# Patient Record
Sex: FEMALE | Race: WHITE | NOT HISPANIC OR LATINO | Employment: UNEMPLOYED | URBAN - METROPOLITAN AREA
[De-identification: names, ages, dates, MRNs, and addresses within clinical notes are randomized per-mention and may not be internally consistent; named-entity substitution may affect disease eponyms.]

---

## 2023-05-18 DIAGNOSIS — R06.02 SHORTNESS OF BREATH: ICD-10-CM

## 2023-05-18 RX ORDER — ALBUTEROL SULFATE 90 UG/1
AEROSOL, METERED RESPIRATORY (INHALATION)
COMMUNITY
Start: 2021-10-21 | End: 2023-05-18 | Stop reason: SDUPTHER

## 2023-05-19 RX ORDER — ALBUTEROL SULFATE 90 UG/1
AEROSOL, METERED RESPIRATORY (INHALATION)
Qty: 18 G | Refills: 3 | Status: SHIPPED | OUTPATIENT
Start: 2023-05-19

## 2023-06-02 PROBLEM — M25.9 DISORDER OF JOINT OF PELVIC REGION AND THIGH: Status: ACTIVE | Noted: 2023-06-02

## 2023-06-02 PROBLEM — F17.200 NICOTINE DEPENDENCE: Status: ACTIVE | Noted: 2023-06-02

## 2023-06-02 PROBLEM — R06.02 SHORTNESS OF BREATH: Status: ACTIVE | Noted: 2023-06-02

## 2023-06-02 PROBLEM — M25.561 RIGHT KNEE PAIN: Status: RESOLVED | Noted: 2023-06-02 | Resolved: 2023-06-02

## 2023-06-02 PROBLEM — M54.2 CHRONIC NECK PAIN: Status: ACTIVE | Noted: 2023-06-02

## 2023-06-02 PROBLEM — E78.5 HYPERLIPEMIA: Status: ACTIVE | Noted: 2023-06-02

## 2023-06-02 PROBLEM — N95.2 VAGINAL ATROPHY: Status: ACTIVE | Noted: 2023-06-02

## 2023-06-02 PROBLEM — R51.9 HEADACHE: Status: ACTIVE | Noted: 2023-06-02

## 2023-06-02 PROBLEM — M47.812 SPONDYLOSIS OF CERVICAL REGION WITHOUT MYELOPATHY OR RADICULOPATHY: Status: ACTIVE | Noted: 2023-06-02

## 2023-06-02 PROBLEM — F41.8 DEPRESSION WITH ANXIETY: Status: ACTIVE | Noted: 2023-06-02

## 2023-06-02 PROBLEM — R00.0 TACHYCARDIA: Status: ACTIVE | Noted: 2023-06-02

## 2023-06-02 PROBLEM — N39.0 UTI (URINARY TRACT INFECTION): Status: RESOLVED | Noted: 2023-06-02 | Resolved: 2023-06-02

## 2023-06-02 PROBLEM — K63.5 COLON POLYPOSIS: Status: ACTIVE | Noted: 2023-06-02

## 2023-06-02 PROBLEM — D64.9 ANEMIA: Status: ACTIVE | Noted: 2023-06-02

## 2023-06-02 PROBLEM — D50.9 IRON DEFICIENCY ANEMIA: Status: ACTIVE | Noted: 2023-06-02

## 2023-06-02 PROBLEM — R60.0 LEG EDEMA: Status: ACTIVE | Noted: 2023-06-02

## 2023-06-02 PROBLEM — M70.62 GREATER TROCHANTERIC BURSITIS OF LEFT HIP: Status: ACTIVE | Noted: 2023-06-02

## 2023-06-02 PROBLEM — R32 INCONTINENCE: Status: ACTIVE | Noted: 2023-06-02

## 2023-06-02 PROBLEM — M50.90 CERVICAL DISC DISEASE: Status: ACTIVE | Noted: 2023-06-02

## 2023-06-02 PROBLEM — G89.29 CHRONIC NECK PAIN: Status: ACTIVE | Noted: 2023-06-02

## 2023-06-02 PROBLEM — M71.21 SYNOVIAL CYST OF RIGHT KNEE: Status: RESOLVED | Noted: 2023-06-02 | Resolved: 2023-06-02

## 2023-06-02 PROBLEM — M25.552 LEFT HIP PAIN: Status: RESOLVED | Noted: 2023-06-02 | Resolved: 2023-06-02

## 2023-06-02 PROBLEM — G47.00 INSOMNIA: Status: ACTIVE | Noted: 2023-06-02

## 2023-06-02 PROBLEM — M16.9 OSTEOARTHRITIS OF HIP: Status: ACTIVE | Noted: 2023-06-02

## 2023-06-02 PROBLEM — N20.0 NEPHROLITHIASIS: Status: ACTIVE | Noted: 2023-06-02

## 2023-06-02 PROBLEM — I63.81: Status: ACTIVE | Noted: 2023-06-02

## 2023-06-02 PROBLEM — N32.81 OVERACTIVE BLADDER: Status: ACTIVE | Noted: 2023-06-02

## 2023-06-02 PROBLEM — N31.9 NEUROGENIC BLADDER: Status: ACTIVE | Noted: 2023-06-02

## 2023-06-02 RX ORDER — VARENICLINE TARTRATE 0.5 (11)-1
KIT ORAL
COMMUNITY
Start: 2022-12-05 | End: 2023-06-05 | Stop reason: ALTCHOICE

## 2023-06-02 RX ORDER — NITROFURANTOIN 25; 75 MG/1; MG/1
100 CAPSULE ORAL 2 TIMES DAILY
COMMUNITY
Start: 2022-10-13

## 2023-06-02 RX ORDER — PREGABALIN 150 MG/1
150 CAPSULE ORAL 3 TIMES DAILY
COMMUNITY
End: 2023-11-16 | Stop reason: SDUPTHER

## 2023-06-02 RX ORDER — FERROUS SULFATE 325(65) MG
TABLET ORAL WEEKLY
COMMUNITY
End: 2023-06-05 | Stop reason: ALTCHOICE

## 2023-06-02 RX ORDER — ASPIRIN 81 MG/1
1 TABLET ORAL DAILY
COMMUNITY
Start: 2019-08-06

## 2023-06-02 RX ORDER — ELECTROLYTES/DEXTROSE
SOLUTION, ORAL ORAL
COMMUNITY
End: 2023-11-15 | Stop reason: WASHOUT

## 2023-06-02 RX ORDER — ACETAMINOPHEN 500 MG
TABLET ORAL
COMMUNITY
Start: 2022-08-15 | End: 2023-11-15 | Stop reason: WASHOUT

## 2023-06-02 RX ORDER — HYDROXYZINE HYDROCHLORIDE 50 MG/1
50 TABLET, FILM COATED ORAL 2 TIMES DAILY
COMMUNITY

## 2023-06-02 RX ORDER — PNV NO.95/FERROUS FUM/FOLIC AC 28MG-0.8MG
100 TABLET ORAL
COMMUNITY
Start: 2020-02-21 | End: 2023-11-15 | Stop reason: WASHOUT

## 2023-06-02 RX ORDER — TRAZODONE HYDROCHLORIDE 50 MG/1
TABLET ORAL
COMMUNITY
Start: 2021-05-25 | End: 2023-11-15 | Stop reason: WASHOUT

## 2023-06-02 RX ORDER — METOPROLOL SUCCINATE 25 MG/1
12.5 TABLET, EXTENDED RELEASE ORAL
COMMUNITY
Start: 2020-02-21 | End: 2023-11-15 | Stop reason: WASHOUT

## 2023-06-02 RX ORDER — DEXTROAMPHETAMINE SACCHARATE, AMPHETAMINE ASPARTATE MONOHYDRATE, DEXTROAMPHETAMINE SULFATE AND AMPHETAMINE SULFATE 7.5; 7.5; 7.5; 7.5 MG/1; MG/1; MG/1; MG/1
CAPSULE, EXTENDED RELEASE ORAL
COMMUNITY
Start: 2023-05-18 | End: 2023-11-15 | Stop reason: WASHOUT

## 2023-06-02 RX ORDER — HYDROXYZINE PAMOATE 50 MG/1
CAPSULE ORAL
COMMUNITY
Start: 2022-08-15 | End: 2023-06-05 | Stop reason: ALTCHOICE

## 2023-06-02 RX ORDER — FLUTICASONE PROPIONATE 50 MCG
2 SPRAY, SUSPENSION (ML) NASAL DAILY
COMMUNITY
Start: 2022-12-29 | End: 2024-03-21 | Stop reason: SDUPTHER

## 2023-06-02 RX ORDER — CHOLECALCIFEROL (VITAMIN D3) 25 MCG
TABLET ORAL
COMMUNITY
Start: 2022-08-15 | End: 2023-11-15 | Stop reason: WASHOUT

## 2023-06-02 RX ORDER — LANOLIN ALCOHOL/MO/W.PET/CERES
100 CREAM (GRAM) TOPICAL 3 TIMES DAILY
COMMUNITY
Start: 2020-02-21 | End: 2023-11-15 | Stop reason: WASHOUT

## 2023-06-02 RX ORDER — ERGOCALCIFEROL 1.25 MG/1
50000 CAPSULE ORAL WEEKLY
COMMUNITY
Start: 2020-02-21 | End: 2023-11-15 | Stop reason: WASHOUT

## 2023-06-02 RX ORDER — DULOXETIN HYDROCHLORIDE 20 MG/1
30 CAPSULE, DELAYED RELEASE ORAL 2 TIMES DAILY
COMMUNITY
Start: 2020-02-21 | End: 2023-11-15 | Stop reason: WASHOUT

## 2023-06-02 RX ORDER — IBUPROFEN 200 MG
TABLET ORAL
COMMUNITY
Start: 2020-02-21 | End: 2023-11-15 | Stop reason: WASHOUT

## 2023-06-02 RX ORDER — ATORVASTATIN CALCIUM 40 MG/1
1 TABLET, FILM COATED ORAL DAILY
COMMUNITY
Start: 2019-08-06 | End: 2023-06-19 | Stop reason: SDUPTHER

## 2023-06-05 ENCOUNTER — OFFICE VISIT (OUTPATIENT)
Dept: PRIMARY CARE | Facility: CLINIC | Age: 58
End: 2023-06-05
Payer: COMMERCIAL

## 2023-06-05 VITALS
HEART RATE: 83 BPM | TEMPERATURE: 96.8 F | WEIGHT: 168 LBS | HEIGHT: 65 IN | SYSTOLIC BLOOD PRESSURE: 122 MMHG | OXYGEN SATURATION: 97 % | BODY MASS INDEX: 27.99 KG/M2 | DIASTOLIC BLOOD PRESSURE: 82 MMHG

## 2023-06-05 DIAGNOSIS — Z00.129 ENCOUNTER FOR ROUTINE CHILD HEALTH EXAMINATION W/O ABNORMAL FINDINGS: ICD-10-CM

## 2023-06-05 DIAGNOSIS — K63.5 POLYP OF ASCENDING COLON, UNSPECIFIED TYPE: ICD-10-CM

## 2023-06-05 DIAGNOSIS — F17.210 CIGARETTE NICOTINE DEPENDENCE WITHOUT COMPLICATION: Primary | ICD-10-CM

## 2023-06-05 PROBLEM — N32.81 OVERACTIVE BLADDER: Status: RESOLVED | Noted: 2023-06-02 | Resolved: 2023-06-05

## 2023-06-05 PROCEDURE — 99396 PREV VISIT EST AGE 40-64: CPT | Performed by: FAMILY MEDICINE

## 2023-06-05 ASSESSMENT — ENCOUNTER SYMPTOMS
BACK PAIN: 1
EYE PAIN: 0
BLOOD IN STOOL: 0
DIARRHEA: 0
SHORTNESS OF BREATH: 0
NAUSEA: 0
DIZZINESS: 0
DECREASED CONCENTRATION: 0
FEVER: 0
JOINT SWELLING: 1
COUGH: 0
NECK PAIN: 1
TROUBLE SWALLOWING: 0
HEMATURIA: 0
WEAKNESS: 0
ABDOMINAL PAIN: 0
CONFUSION: 0
FREQUENCY: 0
NUMBNESS: 0
VOMITING: 0
SORE THROAT: 0
NECK STIFFNESS: 1
MYALGIAS: 1
ARTHRALGIAS: 1
PALPITATIONS: 0
HEADACHES: 0
FATIGUE: 0
UNEXPECTED WEIGHT CHANGE: 0
HALLUCINATIONS: 0
DYSURIA: 0

## 2023-06-05 ASSESSMENT — PAIN SCALES - GENERAL: PAINLEVEL: 0-NO PAIN

## 2023-06-05 NOTE — PROGRESS NOTES
Subjective   Patient ID: Mona Dolan is a 58 y.o. female.    Patient comes in today for physical exam.  There has been no chest pain, shortness of breath, fever, chills, unexplained weight loss, rectal bleeding or any other unusual symptoms.  She fell and sprained her ankle and skinned her right knee but it is doing better.  She has an extensive medical history that was reviewed.  She is a smoker.  Recent labs, diagnostics and pertinent information has been reviewed. Patient is attempting to eat a healthy diet and incorporate exercise in to their lifestyle.  Alcohol in moderation. Patient is practicing routine eye and dental care. Reviewed employment status. No uncontrolled anxiety, depression. Reviewed current medications, if any.          Review of Systems   Constitutional:  Negative for fatigue, fever and unexpected weight change.   HENT:  Negative for congestion, ear pain, hearing loss, sore throat and trouble swallowing.    Eyes:  Negative for pain and visual disturbance.   Respiratory:  Negative for cough and shortness of breath.    Cardiovascular:  Negative for chest pain, palpitations and leg swelling.   Gastrointestinal:  Negative for abdominal pain, blood in stool, diarrhea, nausea and vomiting.   Genitourinary:  Negative for dysuria, frequency, hematuria and urgency.   Musculoskeletal:  Positive for arthralgias, back pain, gait problem, joint swelling, myalgias, neck pain and neck stiffness.   Skin:  Negative for pallor and rash.   Neurological:  Negative for dizziness, syncope, weakness, numbness and headaches.   Psychiatric/Behavioral:  Negative for confusion, decreased concentration, hallucinations and suicidal ideas.      Vitals:    06/05/23 1348   BP: 122/82   Pulse: 83   Temp: 36 °C (96.8 °F)   SpO2: 97%      Objective   Physical Exam  Constitutional:       Appearance: Normal appearance.   HENT:      Head: Normocephalic and atraumatic.      Right Ear: Tympanic membrane and external ear normal.       Left Ear: Tympanic membrane and external ear normal.      Nose: Nose normal.      Mouth/Throat:      Mouth: Mucous membranes are moist.      Pharynx: Oropharynx is clear. No oropharyngeal exudate.   Eyes:      Extraocular Movements: Extraocular movements intact.      Conjunctiva/sclera: Conjunctivae normal.      Pupils: Pupils are equal, round, and reactive to light.   Cardiovascular:      Rate and Rhythm: Normal rate and regular rhythm.      Heart sounds: Normal heart sounds.   Pulmonary:      Effort: Pulmonary effort is normal.      Breath sounds: Normal breath sounds.   Abdominal:      General: Abdomen is flat.      Palpations: Abdomen is soft. There is no mass.      Tenderness: There is no abdominal tenderness. There is no guarding.   Musculoskeletal:      Cervical back: Neck supple.   Lymphadenopathy:      Cervical: No cervical adenopathy.   Skin:     General: Skin is warm and dry.   Neurological:      General: No focal deficit present.      Mental Status: She is alert.   Psychiatric:         Mood and Affect: Mood normal.         Speech: Speech normal.         Behavior: Behavior normal.         Cognition and Memory: Cognition normal.         Assessment/Plan   There are no diagnoses linked to this encounter.

## 2023-06-05 NOTE — PATIENT INSTRUCTIONS
It was nice to see you today!  Discussed current concerns and addressed   Reviewed recent labs and diagnostics  Reviewed medications list  Continue to eat a healthy diet, exercise at least 3 times a week or more  Plan and follow up discussed  For any further information related to your condition, copy and paste or go to [email or web address]  Patient needs to get in for colonoscopy as they recommended a 1 year follow-up and she is well overdue.  She is well aware of this and I have put the order in.

## 2023-06-19 DIAGNOSIS — E78.5 HYPERLIPIDEMIA, UNSPECIFIED HYPERLIPIDEMIA TYPE: ICD-10-CM

## 2023-06-19 RX ORDER — ATORVASTATIN CALCIUM 40 MG/1
40 TABLET, FILM COATED ORAL DAILY
Qty: 90 TABLET | Refills: 3 | Status: SHIPPED | OUTPATIENT
Start: 2023-06-19

## 2023-08-04 LAB
APPEARANCE, URINE: ABNORMAL
BACTERIA, URINE: ABNORMAL /HPF
BILIRUBIN, URINE: NEGATIVE
BLOOD, URINE: ABNORMAL
COLOR, URINE: YELLOW
GLUCOSE, URINE: NEGATIVE MG/DL
KETONES, URINE: NEGATIVE MG/DL
LEUKOCYTE ESTERASE, URINE: ABNORMAL
MUCUS, URINE: ABNORMAL /LPF
NITRITE, URINE: POSITIVE
PH, URINE: 7 (ref 5–8)
PROTEIN, URINE: ABNORMAL MG/DL
RBC, URINE: 7 /HPF (ref 0–5)
SPECIFIC GRAVITY, URINE: 1.01 (ref 1–1.03)
SQUAMOUS EPITHELIAL CELLS, URINE: <1 /HPF
TRANSITIONAL EPITHELIAL CELLS, URINE: <1 /HPF
UROBILINOGEN, URINE: <2 MG/DL (ref 0–1.9)
WBC CLUMPS, URINE: ABNORMAL /HPF
WBC, URINE: 134 /HPF (ref 0–5)

## 2023-08-06 LAB — URINE CULTURE: ABNORMAL

## 2023-08-31 PROBLEM — Z72.0 TOBACCO ABUSE: Status: ACTIVE | Noted: 2023-08-31

## 2023-08-31 PROBLEM — I48.91 ATRIAL FIBRILLATION (MULTI): Status: ACTIVE | Noted: 2023-08-31

## 2023-08-31 PROBLEM — H92.09 OTALGIA: Status: ACTIVE | Noted: 2023-08-31

## 2023-08-31 PROBLEM — I82.90 VENOUS THROMBOSIS: Status: ACTIVE | Noted: 2023-08-31

## 2023-08-31 PROBLEM — M71.21 SYNOVIAL CYST OF RIGHT KNEE: Status: ACTIVE | Noted: 2023-06-02

## 2023-08-31 PROBLEM — Z91.89 AT RISK FOR BLEEDING: Status: ACTIVE | Noted: 2023-08-31

## 2023-08-31 PROBLEM — S06.0XAA BRAIN CONCUSSION: Status: ACTIVE | Noted: 2023-08-31

## 2023-08-31 PROBLEM — I63.9 CEREBROVASCULAR ACCIDENT (MULTI): Status: ACTIVE | Noted: 2023-08-31

## 2023-08-31 PROBLEM — E55.9 VITAMIN D DEFICIENCY: Status: ACTIVE | Noted: 2023-08-31

## 2023-08-31 PROBLEM — R07.89 TIGHT CHEST: Status: ACTIVE | Noted: 2023-08-31

## 2023-08-31 PROBLEM — M54.30 SCIATIC PAIN: Status: ACTIVE | Noted: 2023-08-31

## 2023-08-31 PROBLEM — I63.81: Status: ACTIVE | Noted: 2023-08-31

## 2023-08-31 PROBLEM — R55 SYNCOPE AND COLLAPSE: Status: ACTIVE | Noted: 2023-08-31

## 2023-08-31 PROBLEM — R00.2 PALPITATIONS: Status: ACTIVE | Noted: 2023-08-31

## 2023-08-31 PROBLEM — K63.5 COLON POLYPOSIS: Status: ACTIVE | Noted: 2023-08-31

## 2023-08-31 RX ORDER — IBUPROFEN 200 MG
1 TABLET ORAL DAILY
COMMUNITY
End: 2023-11-15 | Stop reason: WASHOUT

## 2023-08-31 RX ORDER — DULOXETIN HYDROCHLORIDE 60 MG/1
60 CAPSULE, DELAYED RELEASE ORAL EVERY MORNING
COMMUNITY
End: 2023-11-15 | Stop reason: WASHOUT

## 2023-08-31 RX ORDER — METHYLPREDNISOLONE 4 MG/1
TABLET ORAL
COMMUNITY
Start: 2022-12-09 | End: 2023-11-15 | Stop reason: WASHOUT

## 2023-08-31 RX ORDER — DULOXETIN HYDROCHLORIDE 30 MG/1
30 CAPSULE, DELAYED RELEASE ORAL DAILY
COMMUNITY

## 2023-08-31 RX ORDER — DEXTROAMPHETAMINE SACCHARATE, AMPHETAMINE ASPARTATE MONOHYDRATE, DEXTROAMPHETAMINE SULFATE AND AMPHETAMINE SULFATE 5; 5; 5; 5 MG/1; MG/1; MG/1; MG/1
20 CAPSULE, EXTENDED RELEASE ORAL
COMMUNITY
Start: 2021-07-14 | End: 2023-11-15 | Stop reason: WASHOUT

## 2023-08-31 RX ORDER — SODIUM, POTASSIUM,MAG SULFATES 17.5-3.13G
SOLUTION, RECONSTITUTED, ORAL ORAL
COMMUNITY
End: 2023-11-15 | Stop reason: WASHOUT

## 2023-08-31 RX ORDER — HYDROXYZINE PAMOATE 50 MG/1
50 CAPSULE ORAL
COMMUNITY
Start: 2022-08-15 | End: 2023-11-15 | Stop reason: WASHOUT

## 2023-08-31 RX ORDER — VARENICLINE TARTRATE 0.5 (11)-1
KIT ORAL
COMMUNITY
Start: 2022-12-02 | End: 2023-11-15 | Stop reason: WASHOUT

## 2023-09-28 ENCOUNTER — HOSPITAL ENCOUNTER (OUTPATIENT)
Dept: DATA CONVERSION | Facility: HOSPITAL | Age: 58
Discharge: HOME | End: 2023-09-28
Payer: COMMERCIAL

## 2023-09-28 DIAGNOSIS — Z51.89 ENCOUNTER FOR OTHER SPECIFIED AFTERCARE: ICD-10-CM

## 2023-10-04 ENCOUNTER — APPOINTMENT (OUTPATIENT)
Dept: PHYSICAL THERAPY | Facility: CLINIC | Age: 58
End: 2023-10-04

## 2023-10-09 ENCOUNTER — APPOINTMENT (OUTPATIENT)
Dept: PAIN MEDICINE | Facility: CLINIC | Age: 58
End: 2023-10-09
Payer: COMMERCIAL

## 2023-11-02 DIAGNOSIS — K63.5 POLYP OF ASCENDING COLON, UNSPECIFIED TYPE: ICD-10-CM

## 2023-11-02 DIAGNOSIS — R06.02 SHORTNESS OF BREATH: ICD-10-CM

## 2023-11-06 ENCOUNTER — TELEPHONE (OUTPATIENT)
Dept: PRIMARY CARE | Facility: CLINIC | Age: 58
End: 2023-11-06
Payer: COMMERCIAL

## 2023-11-06 NOTE — TELEPHONE ENCOUNTER
Pt called today requesting to speak with you I tried to explain I could take a message and get back with her but she just wants to speak with you re: not being able to find doctors (sorry that's all she told me) . Her call back number is [phone]

## 2023-11-13 ENCOUNTER — APPOINTMENT (OUTPATIENT)
Dept: PAIN MEDICINE | Facility: CLINIC | Age: 58
End: 2023-11-13
Payer: COMMERCIAL

## 2023-11-15 ENCOUNTER — OFFICE VISIT (OUTPATIENT)
Dept: PAIN MEDICINE | Facility: CLINIC | Age: 58
End: 2023-11-15
Payer: COMMERCIAL

## 2023-11-15 VITALS
BODY MASS INDEX: 26.03 KG/M2 | HEART RATE: 97 BPM | DIASTOLIC BLOOD PRESSURE: 82 MMHG | RESPIRATION RATE: 18 BRPM | SYSTOLIC BLOOD PRESSURE: 106 MMHG | WEIGHT: 162 LBS | HEIGHT: 66 IN

## 2023-11-15 DIAGNOSIS — M54.12 CERVICAL RADICULITIS: Primary | ICD-10-CM

## 2023-11-15 DIAGNOSIS — M25.512 CHRONIC LEFT SHOULDER PAIN: ICD-10-CM

## 2023-11-15 DIAGNOSIS — G89.29 CHRONIC LEFT SHOULDER PAIN: ICD-10-CM

## 2023-11-15 DIAGNOSIS — M19.012 PRIMARY OSTEOARTHRITIS OF LEFT SHOULDER: ICD-10-CM

## 2023-11-15 DIAGNOSIS — M79.18 DIFFUSE MYOFASCIAL PAIN SYNDROME: ICD-10-CM

## 2023-11-15 DIAGNOSIS — F17.200 SMOKING: ICD-10-CM

## 2023-11-15 PROCEDURE — 20606 DRAIN/INJ JOINT/BURSA W/US: CPT | Performed by: ANESTHESIOLOGY

## 2023-11-15 PROCEDURE — 2500000005 HC RX 250 GENERAL PHARMACY W/O HCPCS: Performed by: ANESTHESIOLOGY

## 2023-11-15 PROCEDURE — 99406 BEHAV CHNG SMOKING 3-10 MIN: CPT | Performed by: ANESTHESIOLOGY

## 2023-11-15 PROCEDURE — 2500000004 HC RX 250 GENERAL PHARMACY W/ HCPCS (ALT 636 FOR OP/ED): Performed by: ANESTHESIOLOGY

## 2023-11-15 PROCEDURE — 76942 ECHO GUIDE FOR BIOPSY: CPT | Performed by: ANESTHESIOLOGY

## 2023-11-15 PROCEDURE — 99204 OFFICE O/P NEW MOD 45 MIN: CPT | Performed by: ANESTHESIOLOGY

## 2023-11-15 PROCEDURE — 20611 DRAIN/INJ JOINT/BURSA W/US: CPT | Performed by: ANESTHESIOLOGY

## 2023-11-15 PROCEDURE — 99214 OFFICE O/P EST MOD 30 MIN: CPT | Performed by: ANESTHESIOLOGY

## 2023-11-15 PROCEDURE — 20552 NJX 1/MLT TRIGGER POINT 1/2: CPT | Performed by: ANESTHESIOLOGY

## 2023-11-15 PROCEDURE — 20552 NJX 1/MLT TRIGGER POINT 1/2: CPT | Mod: XS | Performed by: ANESTHESIOLOGY

## 2023-11-15 PROCEDURE — 20606 DRAIN/INJ JOINT/BURSA W/US: CPT | Mod: LT | Performed by: ANESTHESIOLOGY

## 2023-11-15 RX ORDER — TRIAMCINOLONE ACETONIDE 40 MG/ML
60 INJECTION, SUSPENSION INTRA-ARTICULAR; INTRAMUSCULAR ONCE
Status: COMPLETED | OUTPATIENT
Start: 2023-11-15 | End: 2023-11-15

## 2023-11-15 RX ORDER — BUPIVACAINE HYDROCHLORIDE 5 MG/ML
2 INJECTION, SOLUTION EPIDURAL; INTRACAUDAL ONCE
Status: COMPLETED | OUTPATIENT
Start: 2023-11-15 | End: 2023-11-15

## 2023-11-15 RX ORDER — LIDOCAINE HYDROCHLORIDE 10 MG/ML
3 INJECTION, SOLUTION EPIDURAL; INFILTRATION; INTRACAUDAL; PERINEURAL ONCE
Status: COMPLETED | OUTPATIENT
Start: 2023-11-15 | End: 2023-11-15

## 2023-11-15 RX ADMIN — BUPIVACAINE HYDROCHLORIDE 10 MG: 5 INJECTION, SOLUTION EPIDURAL; INTRACAUDAL; PERINEURAL at 15:33

## 2023-11-15 RX ADMIN — LIDOCAINE HYDROCHLORIDE 30 MG: 10 INJECTION, SOLUTION EPIDURAL; INFILTRATION; INTRACAUDAL; PERINEURAL at 15:35

## 2023-11-15 RX ADMIN — TRIAMCINOLONE ACETONIDE 60 MG: 40 INJECTION, SUSPENSION INTRA-ARTICULAR; INTRAMUSCULAR at 15:38

## 2023-11-15 ASSESSMENT — PAIN DESCRIPTION - DESCRIPTORS: DESCRIPTORS: SHARP;STABBING;TINGLING

## 2023-11-15 ASSESSMENT — PAIN - FUNCTIONAL ASSESSMENT: PAIN_FUNCTIONAL_ASSESSMENT: 0-10

## 2023-11-15 ASSESSMENT — ENCOUNTER SYMPTOMS
DEPRESSION: 0
OCCASIONAL FEELINGS OF UNSTEADINESS: 0
LOSS OF SENSATION IN FEET: 0

## 2023-11-15 ASSESSMENT — PATIENT HEALTH QUESTIONNAIRE - PHQ9
1. LITTLE INTEREST OR PLEASURE IN DOING THINGS: NOT AT ALL
SUM OF ALL RESPONSES TO PHQ9 QUESTIONS 1 AND 2: 1
10. IF YOU CHECKED OFF ANY PROBLEMS, HOW DIFFICULT HAVE THESE PROBLEMS MADE IT FOR YOU TO DO YOUR WORK, TAKE CARE OF THINGS AT HOME, OR GET ALONG WITH OTHER PEOPLE: VERY DIFFICULT
2. FEELING DOWN, DEPRESSED OR HOPELESS: SEVERAL DAYS

## 2023-11-15 ASSESSMENT — PAIN SCALES - GENERAL
PAINLEVEL_OUTOF10: 0 - NO PAIN
PAINLEVEL_OUTOF10: 8
PAINLEVEL_OUTOF10: 0 - NO PAIN

## 2023-11-15 NOTE — PROGRESS NOTES
PAIN MANAGEMENT NEW PATIENT OFFICE NOTE    Date of Service: 11/15/2023    SUBJECTIVE    CHIEF COMPLAINT: L shoulder pain    HISTORY OF PRESENT ILLNESS    Mona Dolan is a 58 y.o. female with PMH CVA 2020 on ASA 81 mg, COPD, smoking, MDD on duloxetine 60 mg QD who presents as new patient with neck and L shoulder pain.    Pt describes L shoulder pain since fall June of this year. Claims hx breaking her shoulder 4 times as a child, but denies shoulder surgery in the past.  Pain worse with movement and better with rest. Pain has been refractive to Tylenol, NSAIDs, Lyrica, heat, >6 w PT. Denies CSI in past. Saw Dr Rodríguez at [facility] who reportedly rec'd arthroscopic surgery, but pt declined.    Patient also reviews neck pain since 2020 since fall. Pain radiates to left 2nd-5th digits assoc with numbness/weakness. Pain has been refractive to Tylenol, NSAIDs, Lyrica, heat, >6 w PT. Pain worse with neck movement. Wants Lyrica refill, which she tolerate sat 150 mg TID without SE.    Pt denies new-onset numbness, weakness, bowel/bladder incontinence.  Pt denies recent infection, allergy to Latex/iodine/contrast. Patient is currently taking the following blood thinner(s): N/A (on ASA 81 mg)    REVIEW OF SYSTEMS  Review of Systems   Constitutional: Negative.    HENT: Negative.     Eyes: Negative.    Respiratory: Negative.     Cardiovascular: Negative.    Gastrointestinal: Negative.    Endocrine: Negative.    Musculoskeletal:  Positive for arthralgias, neck pain and neck stiffness.   Skin: Negative.    Neurological:  Positive for weakness and numbness.   Hematological: Negative.    Psychiatric/Behavioral: Negative.         PAST MEDICAL HISTORY  Past Medical History:   Diagnosis Date    Pain in left hip 04/13/2022    Left hip pain    Right knee pain 06/02/2023    Stroke (cerebrum) (CMS/HCC)      Past Surgical History:   Procedure Laterality Date    CT ANGIO NECK  10/14/2021    CT NECK ANGIO W AND WO IV CONTRAST 10/14/2021 [facility]  CLINICAL LEGACY    CT HEAD ANGIO W AND WO IV CONTRAST  10/14/2021    CT HEAD ANGIO W AND WO IV CONTRAST 10/14/2021 [facility] CLINICAL LEGACY    MR HEAD ANGIO WO IV CONTRAST  6/2/2020    MR HEAD ANGIO WO IV CONTRAST 6/2/2020 [facility] CLINICAL LEGACY    MR HEAD ANGIO WO IV CONTRAST  8/10/2022    MR HEAD ANGIO WO IV CONTRAST 8/10/2022 [facility] CLINICAL LEGACY    MR HEAD ANGIO WO IV CONTRAST  6/24/2019    MR HEAD ANGIO WO IV CONTRAST [facility] INPATIENT LEGACY    MR NECK ANGIO WO IV CONTRAST  6/2/2020    MR NECK ANGIO WO IV CONTRAST 6/2/2020 [facility] CLINICAL LEGACY    MR NECK ANGIO WO IV CONTRAST  8/10/2022    MR NECK ANGIO WO IV CONTRAST 8/10/2022 [facility] CLINICAL LEGACY    OTHER SURGICAL HISTORY  08/31/2020    Hip replacement     Family History   Problem Relation Name Age of Onset    Atrial fibrillation Mother      Diabetes Father      Hypertension Father         CURRENT MEDICATIONS  Current Outpatient Medications   Medication Sig Dispense Refill    albuterol (Ventolin HFA) 90 mcg/actuation inhaler Inhale 1 to 2 puffs by mouth and into the lungs every 4 to 6 hours if needed. 18 g 3    aspirin 81 mg EC tablet Take 1 tablet (81 mg) by mouth once daily.      atorvastatin (Lipitor) 40 mg tablet Take 1 tablet (40 mg) by mouth once daily. 90 tablet 3    DULoxetine (Cymbalta) 30 mg DR capsule Take 1 capsule (30 mg) by mouth once daily. Do not crush or chew.      fluticasone (Flonase) 50 mcg/actuation nasal spray Administer 2 sprays into each nostril once daily.      hydrOXYzine HCL (Atarax) 50 mg tablet Take 1 tablet (50 mg) by mouth 2 times a day.      nitrofurantoin, macrocrystal-monohydrate, (Macrobid) 100 mg capsule Take 1 capsule (100 mg) by mouth 2 times a day.      pregabalin (Lyrica) 150 mg capsule Take 1 capsule (150 mg) by mouth 3 times a day.       No current facility-administered medications for this visit.       ALLERGIES AND DRUG REACTIONS  Allergies   Allergen Reactions    Tramadol Hallucinations, Other and Unknown         "  OBJECTIVE  Visit Vitals  /82   Pulse 97   Resp 18   Ht 1.676 m (5' 6\")   Wt 73.5 kg (162 lb)   BMI 26.15 kg/m²   Smoking Status Every Day   BSA 1.85 m²       Last Recorded Pain Score (if available):                Physical Exam  Vitals and nursing note reviewed.       General: Sitting in chair, NAD  Head: NCAT  Eyes: Sclera/conjunctiva clear, EOMI, PERRL  Nose/mouth: MMM  CV: Good distal pulses  Lungs: Good/equal chest excursion  Abdomen: Soft, ND  Ext: No cyanosis/edema  MSK: c-spine alignment: anterior tilt, left paraspinal m TTP, c-spine ROM: lmtd extension, pain with lateral rotation/flexion, fwd flexion OK. +Spurling  LEFT SHOULDER: mild swelling, no erythema. Abducts to 170 deg. +empty can & [place]. GHJ, ACJ TTP. Trap/levator palpable TP  Neuro: AAOx3   Dermatome sensation to light touch  LEFT C5: WNL    RIGHT C5: WNL      LEFT C6: WNL       RIGHT C6: WNL      LEFT C7: WNL       RIGHT C7: WNL      LEFT C8: WNL       RIGHT C8: WNL      LEFT T1: WNL       RIGHT T1: WNL    LEFT L1 (lower pelvis/upper thigh): WNL    RIGHT L1: WNL      LEFT L2 (upper thigh): WNL       RIGHT: L2:WNL      LEFT L3 (medial knee): WNL       RIGHT L3: WNL      LEFT L4 (superior medial malleolus): WNL       RIGHT L4: WNL      LEFT L5 (dorsal foot): WNL       RIGHT L5: WNL      LEFT S1 (lateral foot): WNL     RIGHT S1: WNL      LEFT S2 (popliteal fossa): WNL    RIGHT S2: WNL        Motor strength  LEFT C5 (elbow flexion): 5/5   RIGHT C5: 5/5  LEFT C6 (wrist extension): 5/5     RIGHT C6: 5/5  LEFT C7 (elbow extension): 5/5     RIGHT C7: 5/5  LEFT C8 (finger abduction): 5/5     RIGHT C8: 5/5  LEFT T1 (hand ): 5/5     RIGHT T1: 5/5    LEFT L2 (hip flexion): 5/5   RIGHT L2: 5/5  LEFT L3 (knee extension): 5/5     RIGHT L3: 5/5  LEFT L4 (dorsiflexion): 5/5     RIGHT L4: 5/5  LEFT L5 (EHL extension): 5/5     RIGHT L5: 5/5  LEFT S1 (plantarflexion): 5/5     RIGHT S1: 5/5  LEFT S2 (knee flexion): 5/5      RIGHT S2: 5/5    Special " testing  Torres: neg BL  DTR diminished patellar reflexes BL  Clonus: neg BL  Babinski: neg BL    Psych: affect nl  Skin: no rash/lesions      REVIEW OF LABORATORY DATA  I have reviewed the following lab results:  WBC   Date Value Ref Range Status   08/10/2022 9.3 4.4 - 11.3 x10E9/L Final     RBC   Date Value Ref Range Status   08/10/2022 3.93 (L) 4.00 - 5.20 x10E12/L Final     Hemoglobin   Date Value Ref Range Status   08/10/2022 11.2 (L) 12.0 - 16.0 g/dL Final     Hematocrit   Date Value Ref Range Status   08/10/2022 35.0 (L) 36.0 - 46.0 % Final     MCV   Date Value Ref Range Status   08/10/2022 89 80 - 100 fL Final     MCH   Date Value Ref Range Status   07/01/2019 32.2 26 - 34 PG Final     MCHC   Date Value Ref Range Status   08/10/2022 32.0 32.0 - 36.0 g/dL Final     RDW   Date Value Ref Range Status   08/10/2022 14.0 11.5 - 14.5 % Final     Platelets   Date Value Ref Range Status   08/10/2022 250 150 - 450 x10E9/L Final     MPV   Date Value Ref Range Status   07/01/2019 11.9 7.0 - 12.6 CU Final     Sodium   Date Value Ref Range Status   08/10/2022 138 136 - 145 mmol/L Final     Potassium   Date Value Ref Range Status   08/10/2022 3.5 3.5 - 5.3 mmol/L Final     Bicarbonate   Date Value Ref Range Status   08/10/2022 29 21 - 32 mmol/L Final     Urea Nitrogen   Date Value Ref Range Status   08/10/2022 12 6 - 23 mg/dL Final     Calcium   Date Value Ref Range Status   08/10/2022 9.2 8.6 - 10.3 mg/dL Final     Protime   Date Value Ref Range Status   05/10/2022 11.1 9.8 - 13.4 sec Final     INR   Date Value Ref Range Status   05/10/2022 1.0 0.9 - 1.1 Final         REVIEW OF RADIOLOGY   I have reviewed the following:  Radiology Studies           MRI L shoulder 10/25/23:  Moderate to severe osteoarthritis left glenohumeral joint with associated   intra-articular joint bodies.     Rotator cuff tendinosis without tear.       MRI c-spine 6/8/20:  Cervical spondylosis as detailed, most notable for mild eccentric right    spinal canal narrowing at C5-6, and multilevel foraminal narrowing worst   at C5-6 on the right (moderate).     Mid/lower lumbar degenerative disc and facet joint changes without   significant spinal canal or foraminal stenosis.     Cervical Anatomic Variant:  None.  Assume 7 cervical vertebrae with   counting from the craniocervical junction.   Anatomic Thoracic/Lumbar Variant: None.  L4-5 is considered the level of   the iliac crest and assume there are 5 lumbar-type vertebrae.        ASSESSMENT & PLAN  Mona Dolan is a 58 y.o. old female with PMH CVA 2020 on ASA 81 mg, COPD, smoking, MDD on duloxetine 60 mg QD who presents as new patient with neck pain  radiating to L hand c/w cervical radic and L shoulder pain 2/2 mod-severe OA.    1) Neck pain  -Radiating to L hand with subj numbness/weakness since 2020 with +Spurling without objective deficit  -Refractive to >3 y conservative tx including Tylenol, NSAIDs, Lyrica, heat, >6 w PT  -Reviewed/discussed MRI c-spine 6/8/20: multilevel spondylosis featuring mild C5-6 canal and moderate R NF stenosis  -MRI c-spine to progression of neuraxial dz and guide possible intervention  -Refill Lyrica 150 mg TID per pt request, which has helped control her sx    2) L shoulder pain  -Worse with shoulder movement and exacerbate d by fall 6/2023  -Refractive to >3 mo conservative tx including Tylenol, NSAIDs, Lyrica, heat, >6 w PT  -Reviewed/discussed MRI L shoulder 10/25/23: mod-severe OA  -L GHJ/ACJ CSI & TPI under US today  -F/U 2 w    3) Smoking  - Patient smokes 1/2 ppd. Encouraged/counseled on smoking cessation as this may increase systemic inflammatory factors which may contribute to patient's chronic pain in addition to smoking as generalized health detriments.        Discussed procedure risks/benefits in detail with patient. Pt meets medical necessity for procedure due to failure of conservative measures. Reviewed procedural risks including bleeding, infection, nerve  damage, paralysis. Also reviewed mitigating factors such as screening for infection/blood thinner use, sterile precautions, and image-guidance when applicable. All questions answered. Pt/guardian expressed understanding and choose to proceed           Riaz Johnson MD  Anesthesiologist & Interventional Pain Physician   [facility] [place]  O: [phone]  F: [phone]  2:15 PM  11/15/23

## 2023-11-16 ENCOUNTER — TELEPHONE (OUTPATIENT)
Dept: PAIN MEDICINE | Facility: CLINIC | Age: 58
End: 2023-11-16

## 2023-11-16 DIAGNOSIS — M54.12 CERVICAL RADICULITIS: Primary | ICD-10-CM

## 2023-11-16 DIAGNOSIS — M54.12 CERVICAL RADICULITIS: ICD-10-CM

## 2023-11-16 RX ORDER — PREGABALIN 150 MG/1
150 CAPSULE ORAL 3 TIMES DAILY
Qty: 90 CAPSULE | Refills: 0 | Status: SHIPPED | OUTPATIENT
Start: 2023-11-16 | End: 2023-11-17 | Stop reason: SDUPTHER

## 2023-11-16 ASSESSMENT — ENCOUNTER SYMPTOMS
GASTROINTESTINAL NEGATIVE: 1
NUMBNESS: 1
RESPIRATORY NEGATIVE: 1
ENDOCRINE NEGATIVE: 1
NECK STIFFNESS: 1
CARDIOVASCULAR NEGATIVE: 1
PSYCHIATRIC NEGATIVE: 1
HEMATOLOGIC/LYMPHATIC NEGATIVE: 1
EYES NEGATIVE: 1
CONSTITUTIONAL NEGATIVE: 1
NECK PAIN: 1
ARTHRALGIAS: 1
WEAKNESS: 1

## 2023-11-16 NOTE — PROGRESS NOTES
"Procedure: left glenohumeral joint injection(s)  Approach: anterior  Position: Seated  Prep: alcohol  Needle: 1.5\" 25 G  Medication: 40 mg triamcinolone +2 ml 0.5% bupivacaine per site  Image guidance: ultrasound    Glenohumeral joint identified under ultrasound. Needle inserted out-of-plane into joint. After neg aspiration, medication injected. Needle withdrawn. Hemostasis achieved.  Pt tolerated procedure without issue      Riaz Johnson MD  Anesthesiologist & Interventional Pain Physician   [facility] [place]  O: [phone]  F: [phone]  9:17 AM  11/16/23    "

## 2023-11-16 NOTE — PROGRESS NOTES
PROCEDURE - TRIGGER POINT INJECTION   DIAGNOSIS:  Myofascial Trigger Point (designate muscles)     SIDE: left  Image guidance: ultrasound to identify muscles    1.  Levator scapula  2.  trapezius    Position: prone  Sterile Prep:  Alcohol   Needle(s):  25G X 1 1/4   Meds: 3 cc 1% lidocaine    Muscle(s) Injected under US guidance  1.  Levator scapula  2.  trapezius    Patient tolerated the procedure well.   DISCHARGE SUMMARY: Complications: None; Condition on Discharge:  Stable and unchanged from admission; Disposition/Discharge:  Home     Post-op or Final Diagnosis:  Myofascial pain      Riaz Johnson MD  Anesthesiologist & Interventional Pain Physician   [facility] [place]  O: [phone]  F: [phone]  9:18 AM  11/16/23

## 2023-11-16 NOTE — PROGRESS NOTES
"Procedure: left acromioclavicular joint injection(s)  Approach: Superior  Position: Seated  Prep: alcohol  Needle: 1.5\" 25 G  Medication: 20 mg triamcinolone per site  Image guidance: ultrasound    Joint identified at junction of acromion and clavicle via ultrasound. Needle inserted out-of-plane into joint. After neg aspiration, medication injected. Needle withdrawn. Hemostasis achieved. Pt tolerated procedure without issue      Riaz Johnson MD  Interventional Pain Physician  [facility]  9:18 AM  11/16/23  "

## 2023-11-17 ENCOUNTER — TELEPHONE (OUTPATIENT)
Dept: PAIN MEDICINE | Facility: CLINIC | Age: 58
End: 2023-11-17

## 2023-11-17 RX ORDER — PREGABALIN 150 MG/1
150 CAPSULE ORAL 3 TIMES DAILY
Qty: 90 CAPSULE | Refills: 0 | Status: SHIPPED | OUTPATIENT
Start: 2023-11-17 | End: 2023-12-14 | Stop reason: SDUPTHER

## 2023-11-29 ENCOUNTER — APPOINTMENT (OUTPATIENT)
Dept: PAIN MEDICINE | Facility: CLINIC | Age: 58
End: 2023-11-29
Payer: COMMERCIAL

## 2023-12-04 ENCOUNTER — APPOINTMENT (OUTPATIENT)
Dept: RADIOLOGY | Facility: CLINIC | Age: 58
End: 2023-12-04
Payer: COMMERCIAL

## 2023-12-08 ENCOUNTER — APPOINTMENT (OUTPATIENT)
Dept: RADIOLOGY | Facility: HOSPITAL | Age: 58
End: 2023-12-08
Payer: COMMERCIAL

## 2023-12-08 ENCOUNTER — HOSPITAL ENCOUNTER (EMERGENCY)
Facility: HOSPITAL | Age: 58
Discharge: HOME | End: 2023-12-08
Attending: STUDENT IN AN ORGANIZED HEALTH CARE EDUCATION/TRAINING PROGRAM
Payer: COMMERCIAL

## 2023-12-08 VITALS
BODY MASS INDEX: 25.58 KG/M2 | TEMPERATURE: 98.6 F | DIASTOLIC BLOOD PRESSURE: 91 MMHG | HEART RATE: 81 BPM | HEIGHT: 66 IN | RESPIRATION RATE: 18 BRPM | SYSTOLIC BLOOD PRESSURE: 132 MMHG | WEIGHT: 159.17 LBS | OXYGEN SATURATION: 97 %

## 2023-12-08 DIAGNOSIS — R10.31 ACUTE ABDOMINAL PAIN IN RIGHT LOWER QUADRANT: Primary | ICD-10-CM

## 2023-12-08 DIAGNOSIS — N30.00 ACUTE CYSTITIS WITHOUT HEMATURIA: ICD-10-CM

## 2023-12-08 LAB
ALBUMIN SERPL-MCNC: 4.3 G/DL (ref 3.5–5)
ALP BLD-CCNC: 119 U/L (ref 35–125)
ALT SERPL-CCNC: 18 U/L (ref 5–40)
ANION GAP SERPL CALC-SCNC: 15 MMOL/L
APPEARANCE UR: ABNORMAL
AST SERPL-CCNC: 20 U/L (ref 5–40)
BACTERIA #/AREA URNS AUTO: ABNORMAL /HPF
BASOPHILS # BLD AUTO: 0.07 X10*3/UL (ref 0–0.1)
BASOPHILS NFR BLD AUTO: 0.7 %
BILIRUB SERPL-MCNC: 0.7 MG/DL (ref 0.1–1.2)
BILIRUB UR STRIP.AUTO-MCNC: NEGATIVE MG/DL
BUN SERPL-MCNC: 13 MG/DL (ref 8–25)
CALCIUM SERPL-MCNC: 9.7 MG/DL (ref 8.5–10.4)
CHLORIDE SERPL-SCNC: 101 MMOL/L (ref 97–107)
CO2 SERPL-SCNC: 18 MMOL/L (ref 24–31)
COLOR UR: YELLOW
CREAT SERPL-MCNC: 0.7 MG/DL (ref 0.4–1.6)
EOSINOPHIL # BLD AUTO: 0.08 X10*3/UL (ref 0–0.7)
EOSINOPHIL NFR BLD AUTO: 0.7 %
ERYTHROCYTE [DISTWIDTH] IN BLOOD BY AUTOMATED COUNT: 16 % (ref 11.5–14.5)
GFR SERPL CREATININE-BSD FRML MDRD: >90 ML/MIN/1.73M*2
GLUCOSE SERPL-MCNC: 92 MG/DL (ref 65–99)
GLUCOSE UR STRIP.AUTO-MCNC: NORMAL MG/DL
HCG SERPL-ACNC: 6 MIU/ML
HCG UR QL IA.RAPID: POSITIVE
HCT VFR BLD AUTO: 49.6 % (ref 36–46)
HGB BLD-MCNC: 16.1 G/DL (ref 12–16)
IMM GRANULOCYTES # BLD AUTO: 0.05 X10*3/UL (ref 0–0.7)
IMM GRANULOCYTES NFR BLD AUTO: 0.5 % (ref 0–0.9)
KETONES UR STRIP.AUTO-MCNC: NEGATIVE MG/DL
LEUKOCYTE ESTERASE UR QL STRIP.AUTO: ABNORMAL
LIPASE SERPL-CCNC: <16 U/L (ref 16–63)
LYMPHOCYTES # BLD AUTO: 1.98 X10*3/UL (ref 1.2–4.8)
LYMPHOCYTES NFR BLD AUTO: 18.5 %
MCH RBC QN AUTO: 27.6 PG (ref 26–34)
MCHC RBC AUTO-ENTMCNC: 32.5 G/DL (ref 32–36)
MCV RBC AUTO: 85 FL (ref 80–100)
MONOCYTES # BLD AUTO: 0.88 X10*3/UL (ref 0.1–1)
MONOCYTES NFR BLD AUTO: 8.2 %
MUCOUS THREADS #/AREA URNS AUTO: ABNORMAL /LPF
NEUTROPHILS # BLD AUTO: 7.64 X10*3/UL (ref 1.2–7.7)
NEUTROPHILS NFR BLD AUTO: 71.4 %
NITRITE UR QL STRIP.AUTO: ABNORMAL
NRBC BLD-RTO: 0 /100 WBCS (ref 0–0)
PH UR STRIP.AUTO: 6 [PH]
PLATELET # BLD AUTO: 213 X10*3/UL (ref 150–450)
POTASSIUM SERPL-SCNC: 4 MMOL/L (ref 3.4–5.1)
PROT SERPL-MCNC: 7.3 G/DL (ref 5.9–7.9)
PROT UR STRIP.AUTO-MCNC: ABNORMAL MG/DL
RBC # BLD AUTO: 5.84 X10*6/UL (ref 4–5.2)
RBC # UR STRIP.AUTO: ABNORMAL /UL
RBC #/AREA URNS AUTO: >20 /HPF
SODIUM SERPL-SCNC: 134 MMOL/L (ref 133–145)
SP GR UR STRIP.AUTO: 1.03
SQUAMOUS #/AREA URNS AUTO: ABNORMAL /HPF
TROPONIN T SERPL-MCNC: 8 NG/L
UROBILINOGEN UR STRIP.AUTO-MCNC: NORMAL MG/DL
WBC # BLD AUTO: 10.7 X10*3/UL (ref 4.4–11.3)
WBC #/AREA URNS AUTO: >50 /HPF
WBC CLUMPS #/AREA URNS AUTO: ABNORMAL /HPF

## 2023-12-08 PROCEDURE — 87186 SC STD MICRODIL/AGAR DIL: CPT | Mod: TRILAB | Performed by: EMERGENCY MEDICINE

## 2023-12-08 PROCEDURE — 96375 TX/PRO/DX INJ NEW DRUG ADDON: CPT

## 2023-12-08 PROCEDURE — 96372 THER/PROPH/DIAG INJ SC/IM: CPT

## 2023-12-08 PROCEDURE — 2500000005 HC RX 250 GENERAL PHARMACY W/O HCPCS: Performed by: STUDENT IN AN ORGANIZED HEALTH CARE EDUCATION/TRAINING PROGRAM

## 2023-12-08 PROCEDURE — 85025 COMPLETE CBC W/AUTO DIFF WBC: CPT | Performed by: EMERGENCY MEDICINE

## 2023-12-08 PROCEDURE — 81001 URINALYSIS AUTO W/SCOPE: CPT | Performed by: EMERGENCY MEDICINE

## 2023-12-08 PROCEDURE — 87086 URINE CULTURE/COLONY COUNT: CPT | Mod: TRILAB | Performed by: EMERGENCY MEDICINE

## 2023-12-08 PROCEDURE — 96361 HYDRATE IV INFUSION ADD-ON: CPT

## 2023-12-08 PROCEDURE — 96365 THER/PROPH/DIAG IV INF INIT: CPT | Mod: 59

## 2023-12-08 PROCEDURE — 76705 ECHO EXAM OF ABDOMEN: CPT

## 2023-12-08 PROCEDURE — 2500000001 HC RX 250 WO HCPCS SELF ADMINISTERED DRUGS (ALT 637 FOR MEDICARE OP): Performed by: STUDENT IN AN ORGANIZED HEALTH CARE EDUCATION/TRAINING PROGRAM

## 2023-12-08 PROCEDURE — 74177 CT ABD & PELVIS W/CONTRAST: CPT

## 2023-12-08 PROCEDURE — 2550000001 HC RX 255 CONTRASTS: Performed by: STUDENT IN AN ORGANIZED HEALTH CARE EDUCATION/TRAINING PROGRAM

## 2023-12-08 PROCEDURE — 84702 CHORIONIC GONADOTROPIN TEST: CPT | Performed by: EMERGENCY MEDICINE

## 2023-12-08 PROCEDURE — 81025 URINE PREGNANCY TEST: CPT | Performed by: EMERGENCY MEDICINE

## 2023-12-08 PROCEDURE — 99285 EMERGENCY DEPT VISIT HI MDM: CPT | Performed by: STUDENT IN AN ORGANIZED HEALTH CARE EDUCATION/TRAINING PROGRAM

## 2023-12-08 PROCEDURE — 80053 COMPREHEN METABOLIC PANEL: CPT | Performed by: EMERGENCY MEDICINE

## 2023-12-08 PROCEDURE — 36415 COLL VENOUS BLD VENIPUNCTURE: CPT | Performed by: EMERGENCY MEDICINE

## 2023-12-08 PROCEDURE — 96366 THER/PROPH/DIAG IV INF ADDON: CPT

## 2023-12-08 PROCEDURE — 83690 ASSAY OF LIPASE: CPT | Performed by: EMERGENCY MEDICINE

## 2023-12-08 PROCEDURE — 2500000004 HC RX 250 GENERAL PHARMACY W/ HCPCS (ALT 636 FOR OP/ED): Performed by: EMERGENCY MEDICINE

## 2023-12-08 PROCEDURE — 84484 ASSAY OF TROPONIN QUANT: CPT | Performed by: EMERGENCY MEDICINE

## 2023-12-08 PROCEDURE — 2500000004 HC RX 250 GENERAL PHARMACY W/ HCPCS (ALT 636 FOR OP/ED): Performed by: STUDENT IN AN ORGANIZED HEALTH CARE EDUCATION/TRAINING PROGRAM

## 2023-12-08 RX ORDER — ONDANSETRON 4 MG/1
4 TABLET, ORALLY DISINTEGRATING ORAL ONCE
Status: COMPLETED | OUTPATIENT
Start: 2023-12-08 | End: 2023-12-08

## 2023-12-08 RX ORDER — ONDANSETRON HYDROCHLORIDE 2 MG/ML
4 INJECTION, SOLUTION INTRAVENOUS ONCE
Status: COMPLETED | OUTPATIENT
Start: 2023-12-08 | End: 2023-12-08

## 2023-12-08 RX ORDER — DICYCLOMINE HYDROCHLORIDE 10 MG/ML
20 INJECTION INTRAMUSCULAR ONCE
Status: COMPLETED | OUTPATIENT
Start: 2023-12-08 | End: 2023-12-08

## 2023-12-08 RX ORDER — ACETAMINOPHEN 500 MG
1000 TABLET ORAL ONCE
Status: COMPLETED | OUTPATIENT
Start: 2023-12-08 | End: 2023-12-08

## 2023-12-08 RX ORDER — CEPHALEXIN 500 MG/1
500 CAPSULE ORAL 2 TIMES DAILY
Qty: 20 CAPSULE | Refills: 0 | Status: SHIPPED | OUTPATIENT
Start: 2023-12-08 | End: 2023-12-18

## 2023-12-08 RX ORDER — CEFTRIAXONE 1 G/50ML
1 INJECTION, SOLUTION INTRAVENOUS ONCE
Status: COMPLETED | OUTPATIENT
Start: 2023-12-08 | End: 2023-12-08

## 2023-12-08 RX ADMIN — DICYCLOMINE HYDROCHLORIDE 20 MG: 20 INJECTION, SOLUTION INTRAMUSCULAR at 14:17

## 2023-12-08 RX ADMIN — SODIUM CHLORIDE 1000 ML: 900 INJECTION, SOLUTION INTRAVENOUS at 14:12

## 2023-12-08 RX ADMIN — ONDANSETRON 4 MG: 4 TABLET, ORALLY DISINTEGRATING ORAL at 17:53

## 2023-12-08 RX ADMIN — ACETAMINOPHEN 1000 MG: 500 TABLET ORAL at 17:53

## 2023-12-08 RX ADMIN — CEFTRIAXONE SODIUM 1 G: 1 INJECTION, SOLUTION INTRAVENOUS at 15:46

## 2023-12-08 RX ADMIN — IOHEXOL 75 ML: 350 INJECTION, SOLUTION INTRAVENOUS at 16:10

## 2023-12-08 RX ADMIN — ONDANSETRON 4 MG: 2 INJECTION INTRAMUSCULAR; INTRAVENOUS at 14:18

## 2023-12-08 ASSESSMENT — COLUMBIA-SUICIDE SEVERITY RATING SCALE - C-SSRS
6. HAVE YOU EVER DONE ANYTHING, STARTED TO DO ANYTHING, OR PREPARED TO DO ANYTHING TO END YOUR LIFE?: NO
2. HAVE YOU ACTUALLY HAD ANY THOUGHTS OF KILLING YOURSELF?: NO
1. IN THE PAST MONTH, HAVE YOU WISHED YOU WERE DEAD OR WISHED YOU COULD GO TO SLEEP AND NOT WAKE UP?: NO

## 2023-12-08 ASSESSMENT — PAIN - FUNCTIONAL ASSESSMENT: PAIN_FUNCTIONAL_ASSESSMENT: 0-10

## 2023-12-08 ASSESSMENT — PAIN SCALES - GENERAL: PAINLEVEL_OUTOF10: 4

## 2023-12-08 NOTE — ED TRIAGE NOTES
HPI   Chief Complaint   Patient presents with    Vomiting     Nv x 4 days. Denies chest pain and sob, no known covid exposure. Unable to take medications at home     Abdominal Pain        TRIAGE NOTE   I saw the patient as the Clinician in Triage and performed a brief history and physical exam, established acuity, and ordered appropriate tests to develop basic plan of care. Patient will be seen by an JAYLA, resident and/or physician who will independently evaluate the patient. Please see subsequent provider notes for further details and disposition.     Brief HPI: In brief, Mona Dolna is a 58 y.o. female that presents for evaluation of abdominal pain and vomiting.  The patient reports 4 days of upper abdominal pain, mainly in the right upper quadrant.  She has had nausea and vomiting that gets worse with eating and pain is also worse with eating.  Pain does not radiate into the chest.  There is been no diarrhea or melena.  No hematemesis.  No fevers.  She was seen at an urgent care yesterday and was instructed to come to the emergency department but she waited till today.    Focused Physical exam:   Triage vitals are unremarkable.  Heart rate is in the 80s with regular rhythm and no murmurs.  Lungs are clear to auscultation bilaterally.  The abdomen is soft and nondistended and tender with palpation in the epigastric and right upper quadrant regions.  Right lower quadrant less tender.  There is no guarding or rigidity.    Plan/MDM:   Plan is for EKG, IV fluids, IM Bentyl, IV Zofran, labs, gallbladder ultrasound and CT abdomen and pelvis.  Please see subsequent provider note for further details and disposition     Dar Caldwell D.O.  12:52 PM                          No data recorded                  Patient History   Past Medical History:   Diagnosis Date    Pain in left hip 04/13/2022    Left hip pain    Right knee pain 06/02/2023    Stroke (cerebrum) (CMS/[facility])      Past Surgical History:   Procedure Laterality  Date    CT ANGIO NECK  10/14/2021    CT NECK ANGIO W AND WO IV CONTRAST 10/14/2021 [facility] CLINICAL LEGACY    CT HEAD ANGIO W AND WO IV CONTRAST  10/14/2021    CT HEAD ANGIO W AND WO IV CONTRAST 10/14/2021 [facility] CLINICAL LEGACY    MR HEAD ANGIO WO IV CONTRAST  6/2/2020    MR HEAD ANGIO WO IV CONTRAST 6/2/2020 [facility] CLINICAL LEGACY    MR HEAD ANGIO WO IV CONTRAST  8/10/2022    MR HEAD ANGIO WO IV CONTRAST 8/10/2022 [facility] CLINICAL LEGACY    MR HEAD ANGIO WO IV CONTRAST  6/24/2019    MR HEAD ANGIO WO IV CONTRAST [facility] INPATIENT LEGACY    MR NECK ANGIO WO IV CONTRAST  6/2/2020    MR NECK ANGIO WO IV CONTRAST 6/2/2020 [facility] CLINICAL LEGACY    MR NECK ANGIO WO IV CONTRAST  8/10/2022    MR NECK ANGIO WO IV CONTRAST 8/10/2022 [facility] CLINICAL LEGACY    OTHER SURGICAL HISTORY  08/31/2020    Hip replacement     Family History   Problem Relation Name Age of Onset    Atrial fibrillation Mother      Diabetes Father      Hypertension Father       Social History     Tobacco Use    Smoking status: Every Day     Types: Cigarettes    Smokeless tobacco: Never   Substance Use Topics    Alcohol use: Never    Drug use: Never       Physical Exam   ED Triage Vitals [12/08/23 1245]   Temp Heart Rate Resp BP   37 °C (98.6 °F) 81 18 (!) 132/91      SpO2 Temp Source Heart Rate Source Patient Position   97 % Oral Monitor --      BP Location FiO2 (%)     Left arm --       Physical Exam    ED Course & MDM   ED Course as of 12/10/23 1136   Fri Dec 08, 2023   1553 HCG, Beta-Quantitative: 6 [JM]   1553 Troponin T, High Sensitivity: 8 [JM]      ED Course User Index  [JM] Shirley Wagner MD         Diagnoses as of 12/10/23 1136   Acute abdominal pain in right lower quadrant   Acute cystitis without hematuria       Medical Decision Making      Procedure  Procedures

## 2023-12-08 NOTE — DISCHARGE INSTRUCTIONS
You are seen the emergency department today for abdominal pain.  Your urine tested positive for infection.  Your CT was negative.  At this time you do not need to stay in the hospital.  I recommend that you follow-up with your primary care doctor for recheck.

## 2023-12-11 ENCOUNTER — HOSPITAL ENCOUNTER (OUTPATIENT)
Dept: CARDIOLOGY | Facility: HOSPITAL | Age: 58
Discharge: HOME | End: 2023-12-11
Payer: COMMERCIAL

## 2023-12-11 LAB
ATRIAL RATE: 87 BPM
BACTERIA UR CULT: ABNORMAL
P AXIS: 34 DEGREES
P OFFSET: 201 MS
P ONSET: 152 MS
PR INTERVAL: 152 MS
Q ONSET: 228 MS
QRS COUNT: 15 BEATS
QRS DURATION: 76 MS
QT INTERVAL: 356 MS
QTC CALCULATION(BAZETT): 428 MS
QTC FREDERICIA: 402 MS
R AXIS: 49 DEGREES
T AXIS: 56 DEGREES
T OFFSET: 406 MS
VENTRICULAR RATE: 87 BPM

## 2023-12-11 PROCEDURE — 93005 ELECTROCARDIOGRAM TRACING: CPT

## 2023-12-11 NOTE — ED PROVIDER NOTES
HPI   Chief Complaint   Patient presents with    Vomiting     Nv x 4 days. Denies chest pain and sob, no known covid exposure. Unable to take medications at home     Abdominal Pain       58-year-old female presents with nausea vomiting for the past 4 days.  Appears to worsen with eating.  Notes associated abdominal pain, mostly right-sided.  Patient was seen in urgent care and was told to come to the emergency department for recheck.  No known fevers or chills.                          No data recorded                Patient History   Past Medical History:   Diagnosis Date    Pain in left hip 04/13/2022    Left hip pain    Right knee pain 06/02/2023    Stroke (cerebrum) (CMS/HCC)      Past Surgical History:   Procedure Laterality Date    CT ANGIO NECK  10/14/2021    CT NECK ANGIO W AND WO IV CONTRAST 10/14/2021 [facility] CLINICAL LEGACY    CT HEAD ANGIO W AND WO IV CONTRAST  10/14/2021    CT HEAD ANGIO W AND WO IV CONTRAST 10/14/2021 [facility] CLINICAL LEGACY    MR HEAD ANGIO WO IV CONTRAST  6/2/2020    MR HEAD ANGIO WO IV CONTRAST 6/2/2020 [facility] CLINICAL LEGACY    MR HEAD ANGIO WO IV CONTRAST  8/10/2022    MR HEAD ANGIO WO IV CONTRAST 8/10/2022 [facility] CLINICAL LEGACY    MR HEAD ANGIO WO IV CONTRAST  6/24/2019    MR HEAD ANGIO WO IV CONTRAST [facility] INPATIENT LEGACY    MR NECK ANGIO WO IV CONTRAST  6/2/2020    MR NECK ANGIO WO IV CONTRAST 6/2/2020 [facility] CLINICAL LEGACY    MR NECK ANGIO WO IV CONTRAST  8/10/2022    MR NECK ANGIO WO IV CONTRAST 8/10/2022 [facility] CLINICAL LEGACY    OTHER SURGICAL HISTORY  08/31/2020    Hip replacement     Family History   Problem Relation Name Age of Onset    Atrial fibrillation Mother      Diabetes Father      Hypertension Father       Social History     Tobacco Use    Smoking status: Every Day     Types: Cigarettes    Smokeless tobacco: Never   Substance Use Topics    Alcohol use: Never    Drug use: Never       Physical Exam   ED Triage Vitals [12/08/23 1245]   Temp Heart Rate Resp BP   37 °C (98.6 °F)  81 18 (!) 132/91      SpO2 Temp Source Heart Rate Source Patient Position   97 % Oral Monitor --      BP Location FiO2 (%)     Left arm --       Physical Exam  Vitals and nursing note reviewed.   Constitutional:       General: She is not in acute distress.     Appearance: She is not ill-appearing.   HENT:      Head: Normocephalic and atraumatic.      Mouth/Throat:      Mouth: Mucous membranes are moist.      Pharynx: Oropharynx is clear.   Eyes:      Extraocular Movements: Extraocular movements intact.      Conjunctiva/sclera: Conjunctivae normal.      Pupils: Pupils are equal, round, and reactive to light.   Cardiovascular:      Rate and Rhythm: Normal rate and regular rhythm.   Pulmonary:      Effort: Pulmonary effort is normal. No respiratory distress.      Breath sounds: Normal breath sounds.   Abdominal:      General: There is no distension.      Palpations: Abdomen is soft.      Tenderness: There is abdominal tenderness in the right lower quadrant, epigastric area and periumbilical area.   Musculoskeletal:         General: No swelling or deformity. Normal range of motion.      Cervical back: Normal range of motion and neck supple.   Skin:     General: Skin is warm and dry.      Capillary Refill: Capillary refill takes less than 2 seconds.   Neurological:      General: No focal deficit present.      Mental Status: She is alert and oriented to person, place, and time. Mental status is at baseline.   Psychiatric:         Mood and Affect: Mood normal.         Behavior: Behavior normal.         ED Course & MDM   ED Course as of 12/11/23 1550   Fri Dec 08, 2023   1553 HCG, Beta-Quantitative: 6 [JM]   1553 Troponin T, High Sensitivity: 8 []      ED Course User Index  [JM] Shirley Wagner MD         Diagnoses as of 12/11/23 1550   Acute abdominal pain in right lower quadrant   Acute cystitis without hematuria       Medical Decision Making  58 y.o. female presents with abdominal pain, nausea, vomiting.   Considered cholecystitis, choledocholithiasis, pancreatitis, PUD, perforated bowel, mesenteric ischemia, colitis, IBD, small bowel obstruction, AAA, ACS.  CT and ultrasound negative for acute intra-abdominal pathology.  No leukocytosis, no evidence of significant anemia.  Urine positive for evidence of infection.  Patient feeling improved after treatments in the emergency department and feels comfortable going home to follow up with primary care doctor.             Procedure  Procedures     Shirley Wagner MD  12/11/23 9683

## 2023-12-14 DIAGNOSIS — M54.12 CERVICAL RADICULITIS: ICD-10-CM

## 2023-12-14 RX ORDER — PREGABALIN 150 MG/1
150 CAPSULE ORAL 3 TIMES DAILY
Qty: 90 CAPSULE | Refills: 0 | Status: SHIPPED | OUTPATIENT
Start: 2023-12-17 | End: 2024-01-08 | Stop reason: SDUPTHER

## 2023-12-14 NOTE — TELEPHONE ENCOUNTER
Magali called requesting a refill of her pregabalin.  Last visit 11/15/23 and next appointment is 12/20/23.

## 2023-12-18 ENCOUNTER — APPOINTMENT (OUTPATIENT)
Dept: RADIOLOGY | Facility: CLINIC | Age: 58
End: 2023-12-18
Payer: COMMERCIAL

## 2023-12-18 ENCOUNTER — APPOINTMENT (OUTPATIENT)
Dept: PAIN MEDICINE | Facility: CLINIC | Age: 58
End: 2023-12-18
Payer: COMMERCIAL

## 2023-12-22 ENCOUNTER — TELEPHONE (OUTPATIENT)
Dept: PAIN MEDICINE | Facility: CLINIC | Age: 58
End: 2023-12-22
Payer: COMMERCIAL

## 2023-12-22 NOTE — TELEPHONE ENCOUNTER
Can you call and get her to sign a member consent form for [facility] so I can send in an appeal. THANK YOU !!

## 2023-12-22 NOTE — TELEPHONE ENCOUNTER
Left message for patient to return our phone call to come in to either office to sign this consent to get her MRI appealed.

## 2024-01-08 DIAGNOSIS — M54.12 CERVICAL RADICULITIS: ICD-10-CM

## 2024-01-08 RX ORDER — PREGABALIN 150 MG/1
150 CAPSULE ORAL 3 TIMES DAILY
Qty: 90 CAPSULE | Refills: 0 | Status: SHIPPED | OUTPATIENT
Start: 2024-01-08 | End: 2024-01-16 | Stop reason: SDUPTHER

## 2024-01-08 NOTE — TELEPHONE ENCOUNTER
Patient called for a refill of her lyrica, she was a no show 12/20/23, last office visit 11/15/23.

## 2024-01-12 DIAGNOSIS — M54.12 CERVICAL RADICULITIS: ICD-10-CM

## 2024-01-12 RX ORDER — PREGABALIN 150 MG/1
150 CAPSULE ORAL 3 TIMES DAILY
Qty: 90 CAPSULE | Refills: 0 | Status: CANCELLED | OUTPATIENT
Start: 2024-01-12 | End: 2024-02-11

## 2024-01-12 NOTE — TELEPHONE ENCOUNTER
"Pt called requesting a refill of her Lyrica. Pt was a no show for today's appointment. Next follow up 1/15/24. States she needs a refill \"I can't see straight without it.\"   "

## 2024-01-12 NOTE — TELEPHONE ENCOUNTER
Result Communication    No results found from the In Basket message.    3:07 PM      Results {WERE / WERE NOT:06956} successfully communicated with the {RHEUM PARENT/PATIENT:01652} and they {AMB Acknowledged/Did Not Acknowledge:71006} their understanding.

## 2024-01-15 RX ORDER — PREGABALIN 150 MG/1
150 CAPSULE ORAL 3 TIMES DAILY
Qty: 90 CAPSULE | Refills: 0 | OUTPATIENT
Start: 2024-01-15 | End: 2024-02-14

## 2024-01-16 DIAGNOSIS — M54.12 CERVICAL RADICULITIS: ICD-10-CM

## 2024-01-16 NOTE — TELEPHONE ENCOUNTER
Discharge letter is ready, just needs signature.  Can you please send a 90 day prescription so patient has time to find another physician to take over her Lyrica?

## 2024-01-17 ENCOUNTER — TELEPHONE (OUTPATIENT)
Dept: PAIN MEDICINE | Facility: CLINIC | Age: 59
End: 2024-01-17
Payer: COMMERCIAL

## 2024-01-17 RX ORDER — PREGABALIN 150 MG/1
150 CAPSULE ORAL 3 TIMES DAILY
Qty: 90 CAPSULE | Refills: 0 | Status: SHIPPED | OUTPATIENT
Start: 2024-01-17 | End: 2024-02-16

## 2024-01-17 NOTE — TELEPHONE ENCOUNTER
Patient called to reschedule her appt. I confirmed with Venecia that she is discharged from the practice for non compliance issues and that Dr. Johnson did send a 90 day rx to her pharmacy to give her time to find  a new provider.  She said ok, but didn't understand why. I told her she will receive a letter in the mail.  2 letters were mailed today, one regular and one certified.

## 2024-01-22 ENCOUNTER — LAB (OUTPATIENT)
Dept: LAB | Facility: LAB | Age: 59
End: 2024-01-22
Payer: COMMERCIAL

## 2024-01-22 ENCOUNTER — OFFICE VISIT (OUTPATIENT)
Dept: PRIMARY CARE | Facility: CLINIC | Age: 59
End: 2024-01-22
Payer: COMMERCIAL

## 2024-01-22 VITALS
HEART RATE: 91 BPM | BODY MASS INDEX: 27.8 KG/M2 | HEIGHT: 66 IN | TEMPERATURE: 95.9 F | OXYGEN SATURATION: 99 % | WEIGHT: 173 LBS | SYSTOLIC BLOOD PRESSURE: 100 MMHG | DIASTOLIC BLOOD PRESSURE: 60 MMHG

## 2024-01-22 DIAGNOSIS — I63.81 CEREBRAL INFARCTION DUE TO OCCLUSION OF SMALL ARTERY (MULTI): ICD-10-CM

## 2024-01-22 DIAGNOSIS — I63.89 CEREBROVASCULAR ACCIDENT (CVA) DUE TO OTHER MECHANISM (MULTI): ICD-10-CM

## 2024-01-22 DIAGNOSIS — Z87.440 HISTORY OF UTI: ICD-10-CM

## 2024-01-22 DIAGNOSIS — F17.210 CIGARETTE NICOTINE DEPENDENCE WITHOUT COMPLICATION: ICD-10-CM

## 2024-01-22 DIAGNOSIS — Z87.440 HISTORY OF UTI: Primary | ICD-10-CM

## 2024-01-22 DIAGNOSIS — F11.20 OPIATE DEPENDENCE, CONTINUOUS (MULTI): ICD-10-CM

## 2024-01-22 DIAGNOSIS — I63.81 RIGHT THALAMIC INFARCTION (MULTI): ICD-10-CM

## 2024-01-22 DIAGNOSIS — I48.0 PAROXYSMAL ATRIAL FIBRILLATION (MULTI): ICD-10-CM

## 2024-01-22 PROBLEM — F07.81 POST CONCUSSION SYNDROME: Status: ACTIVE | Noted: 2024-01-22

## 2024-01-22 PROBLEM — H53.2 DIPLOPIA: Status: ACTIVE | Noted: 2024-01-22

## 2024-01-22 PROBLEM — R42 DIZZINESS: Status: ACTIVE | Noted: 2024-01-22

## 2024-01-22 PROBLEM — M54.81 BILATERAL OCCIPITAL NEURALGIA: Status: ACTIVE | Noted: 2024-01-22

## 2024-01-22 LAB
APPEARANCE UR: ABNORMAL
BILIRUB UR STRIP.AUTO-MCNC: NEGATIVE MG/DL
CAOX CRY #/AREA UR COMP ASSIST: NORMAL /HPF
COLOR UR: YELLOW
GLUCOSE UR STRIP.AUTO-MCNC: NEGATIVE MG/DL
KETONES UR STRIP.AUTO-MCNC: ABNORMAL MG/DL
LEUKOCYTE ESTERASE UR QL STRIP.AUTO: ABNORMAL
MUCOUS THREADS #/AREA URNS AUTO: NORMAL /LPF
NITRITE UR QL STRIP.AUTO: NEGATIVE
PH UR STRIP.AUTO: 5 [PH]
PROT UR STRIP.AUTO-MCNC: NEGATIVE MG/DL
RBC # UR STRIP.AUTO: NEGATIVE /UL
RBC #/AREA URNS AUTO: NORMAL /HPF
SP GR UR STRIP.AUTO: 1.02
SQUAMOUS #/AREA URNS AUTO: NORMAL /HPF
UROBILINOGEN UR STRIP.AUTO-MCNC: <2 MG/DL
WBC #/AREA URNS AUTO: NORMAL /HPF

## 2024-01-22 PROCEDURE — 36415 COLL VENOUS BLD VENIPUNCTURE: CPT

## 2024-01-22 PROCEDURE — 81001 URINALYSIS AUTO W/SCOPE: CPT

## 2024-01-22 PROCEDURE — 99214 OFFICE O/P EST MOD 30 MIN: CPT | Performed by: FAMILY MEDICINE

## 2024-01-22 PROCEDURE — 83036 HEMOGLOBIN GLYCOSYLATED A1C: CPT

## 2024-01-22 RX ORDER — LISDEXAMFETAMINE DIMESYLATE 40 MG/1
40 CAPSULE ORAL
COMMUNITY
Start: 2023-11-09

## 2024-01-22 RX ORDER — TRAZODONE HYDROCHLORIDE 50 MG/1
50 TABLET ORAL NIGHTLY
COMMUNITY
Start: 2024-01-15

## 2024-01-22 RX ORDER — NICOTINE 21-14-7MG
KIT TRANSDERMAL
Qty: 1 KIT | Refills: 0 | Status: SHIPPED | OUTPATIENT
Start: 2024-01-22

## 2024-01-22 ASSESSMENT — PAIN SCALES - GENERAL: PAINLEVEL: 0-NO PAIN

## 2024-01-22 NOTE — PROGRESS NOTES
Subjective   Patient ID: Mona Dolan is a 58 y.o. female.    Patient comes in today for follow-up from the hospital.  She had a UTI a few months ago.  She has not followed up.  She also has a history of CVA, A-fib and chronic pain.  She is looking for a new pain management doctor to write her Lyrica.  She was released from her last 1 and in looking in her chart she broke her contract with using marijuana.  She is on Vyvanse for ADD.  She is a smoker.  She has no fever or chills or dysuria now.        Review of Systems   Constitutional:  Negative for fatigue, fever and unexpected weight change.   HENT:  Negative for congestion, ear pain, hearing loss, sore throat and trouble swallowing.    Eyes:  Negative for pain and visual disturbance.   Respiratory:  Negative for cough and shortness of breath.    Cardiovascular:  Negative for chest pain, palpitations and leg swelling.   Gastrointestinal:  Negative for abdominal pain, blood in stool, diarrhea, nausea and vomiting.   Genitourinary:  Negative for dysuria, frequency, hematuria and urgency.   Musculoskeletal:  Positive for arthralgias, back pain and myalgias. Negative for joint swelling.   Skin:  Negative for pallor and rash.   Neurological:  Positive for speech difficulty. Negative for dizziness, syncope, weakness, numbness and headaches.   Psychiatric/Behavioral:  Positive for decreased concentration. Negative for confusion, hallucinations and suicidal ideas.      Vitals:    01/22/24 1341   BP: 100/60   Pulse: 91   Temp: 35.5 °C (95.9 °F)   SpO2: 99%      Objective   Physical Exam  Constitutional:       Appearance: Normal appearance.   HENT:      Head: Normocephalic and atraumatic.   Eyes:      General: No scleral icterus.     Extraocular Movements: Extraocular movements intact.      Pupils: Pupils are equal, round, and reactive to light.   Cardiovascular:      Rate and Rhythm: Normal rate and regular rhythm.      Heart sounds: Normal heart sounds.   Pulmonary:       Effort: Pulmonary effort is normal.      Breath sounds: Normal breath sounds.   Abdominal:      General: Abdomen is flat. Bowel sounds are normal.      Palpations: Abdomen is soft. There is no mass.      Tenderness: There is no abdominal tenderness.   Musculoskeletal:      Cervical back: Neck supple.   Skin:     General: Skin is warm and dry.   Neurological:      Mental Status: She is alert. Mental status is at baseline.      Comments: She is somewhat dysarthric.   Psychiatric:         Mood and Affect: Mood normal.         Speech: Speech normal.         Cognition and Memory: Cognition normal.         Assessment/Plan   There are no diagnoses linked to this encounter.

## 2024-01-23 LAB
EST. AVERAGE GLUCOSE BLD GHB EST-MCNC: 120 MG/DL
HBA1C MFR BLD: 5.8 %

## 2024-01-23 ASSESSMENT — ENCOUNTER SYMPTOMS
VOMITING: 0
ARTHRALGIAS: 1
SHORTNESS OF BREATH: 0
JOINT SWELLING: 0
NAUSEA: 0
SORE THROAT: 0
FATIGUE: 0
BLOOD IN STOOL: 0
HEADACHES: 0
FREQUENCY: 0
PALPITATIONS: 0
UNEXPECTED WEIGHT CHANGE: 0
HALLUCINATIONS: 0
NUMBNESS: 0
HEMATURIA: 0
FEVER: 0
ABDOMINAL PAIN: 0
MYALGIAS: 1
DIARRHEA: 0
COUGH: 0
BACK PAIN: 1
DIZZINESS: 0
TROUBLE SWALLOWING: 0
SPEECH DIFFICULTY: 1
DYSURIA: 0
CONFUSION: 0
WEAKNESS: 0
EYE PAIN: 0
DECREASED CONCENTRATION: 1

## 2024-01-23 NOTE — PATIENT INSTRUCTIONS
It was nice to see you today!  Discussed current concerns and addressed   Reviewed recent labs and diagnostics  Reviewed medications list  Continue to eat a healthy diet, exercise at least 3 times a week or more  Plan and follow up discussed  For any further information related to your condition, copy and paste or go to [email or web address]  Reviewed her chart.  We will recheck urine studies and she would like a diabetes check.  She will have to find a new pain management doctor.  Discussed that no one will write her for scheduled drugs if she is taking marijuana, at least in the [facility].  Discussed smoking cessation.

## 2024-02-19 ENCOUNTER — APPOINTMENT (OUTPATIENT)
Dept: OBSTETRICS AND GYNECOLOGY | Facility: CLINIC | Age: 59
End: 2024-02-19
Payer: COMMERCIAL

## 2024-02-20 RX ORDER — LIDOCAINE HYDROCHLORIDE 20 MG/ML
1 JELLY TOPICAL ONCE
Status: SHIPPED | OUTPATIENT
Start: 2024-02-21

## 2024-02-20 RX ORDER — SODIUM CHLORIDE 9 MG/ML
10 INJECTION, SOLUTION INTRAMUSCULAR; INTRAVENOUS; SUBCUTANEOUS ONCE
Status: SHIPPED | OUTPATIENT
Start: 2024-02-21

## 2024-02-20 RX ORDER — LIDOCAINE HYDROCHLORIDE 10 MG/ML
5 INJECTION INFILTRATION; PERINEURAL ONCE
Status: SHIPPED | OUTPATIENT
Start: 2024-02-21

## 2024-02-20 RX ORDER — PHENAZOPYRIDINE HYDROCHLORIDE 200 MG/1
200 TABLET, FILM COATED ORAL ONCE
Status: SHIPPED | OUTPATIENT
Start: 2024-02-21

## 2024-02-20 RX ORDER — NITROFURANTOIN 25; 75 MG/1; MG/1
100 CAPSULE ORAL ONCE
Status: SHIPPED | OUTPATIENT
Start: 2024-02-21

## 2024-02-21 ENCOUNTER — OFFICE VISIT (OUTPATIENT)
Dept: OBSTETRICS AND GYNECOLOGY | Facility: CLINIC | Age: 59
End: 2024-02-21
Payer: COMMERCIAL

## 2024-02-21 VITALS
SYSTOLIC BLOOD PRESSURE: 107 MMHG | BODY MASS INDEX: 27.64 KG/M2 | HEIGHT: 66 IN | WEIGHT: 172 LBS | DIASTOLIC BLOOD PRESSURE: 74 MMHG | HEART RATE: 98 BPM

## 2024-02-21 DIAGNOSIS — R06.02 SHORTNESS OF BREATH: Primary | ICD-10-CM

## 2024-02-21 DIAGNOSIS — N31.9 NEUROGENIC BLADDER: ICD-10-CM

## 2024-02-21 DIAGNOSIS — Z12.31 SCREENING MAMMOGRAM FOR BREAST CANCER: ICD-10-CM

## 2024-02-21 DIAGNOSIS — Z13.820 OSTEOPOROSIS SCREENING: ICD-10-CM

## 2024-02-21 PROCEDURE — 99212 OFFICE O/P EST SF 10 MIN: CPT | Performed by: OBSTETRICS & GYNECOLOGY

## 2024-02-21 RX ORDER — FLUTICASONE FUROATE, UMECLIDINIUM BROMIDE AND VILANTEROL TRIFENATATE 100; 62.5; 25 UG/1; UG/1; UG/1
1 POWDER RESPIRATORY (INHALATION) DAILY
COMMUNITY
End: 2024-02-21 | Stop reason: SDUPTHER

## 2024-02-21 RX ORDER — FLUTICASONE FUROATE, UMECLIDINIUM BROMIDE AND VILANTEROL TRIFENATATE 100; 62.5; 25 UG/1; UG/1; UG/1
1 POWDER RESPIRATORY (INHALATION) DAILY
Qty: 30 EACH | Refills: 5 | Status: SHIPPED | OUTPATIENT
Start: 2024-02-21 | End: 2024-08-19

## 2024-02-21 ASSESSMENT — PAIN SCALES - GENERAL: PAINLEVEL: 8

## 2024-02-21 NOTE — PROGRESS NOTES
Subjective   Patient ID: Mona Dolan is a 58 y.o. female who presents for Pt. consult.    Urinary symptoms:  -  Would like repeat botox injections   - Needing prior-auth  - Reports her last injections in August 2023 was 100% effective     Review of Systems    Objective   Physical Exam  Constitutional:       Appearance: Normal appearance.   HENT:      Head: Normocephalic.   Pulmonary:      Effort: Pulmonary effort is normal.   Neurological:      Mental Status: She is alert and oriented to person, place, and time.   Psychiatric:         Mood and Affect: Mood normal.         Behavior: Behavior normal.       Assessment/Plan   Assessment:   58 y.o. old female being assessed for OAB and neurogenic bladder    Diagnoses:   #1 OAB  #2 Neurogenic bladder     Plan:   OAB, neurogenic bladder   - S/P August 2023: cystoscopy + intradetrusor Botox injections 150U  - Needing another prior authorization for repeat injections      Follow-up in 1-2 weeks for cystoscopy + intradetrusor Botox injections     Scribe Attestation  By signing my name below, I, Elisabeth Clark , Scribe   attest that this documentation has been prepared under the direction and in the presence of Samantha Chacon MD.      Will obtain updated prior auth and schedule  I Dr. Chacon, personally performed the services described in the documentation which was scribed virtually and confirm it is both complete and accurate.  Samantha Chacon MD    15 min total

## 2024-03-20 ENCOUNTER — TELEPHONE (OUTPATIENT)
Dept: PRIMARY CARE | Facility: CLINIC | Age: 59
End: 2024-03-20
Payer: COMMERCIAL

## 2024-03-20 DIAGNOSIS — J30.1 ALLERGIC RHINITIS DUE TO POLLEN, UNSPECIFIED SEASONALITY: Primary | ICD-10-CM

## 2024-03-21 ENCOUNTER — APPOINTMENT (OUTPATIENT)
Dept: OBSTETRICS AND GYNECOLOGY | Facility: CLINIC | Age: 59
End: 2024-03-21
Payer: COMMERCIAL

## 2024-03-21 PROBLEM — G89.29 CHRONIC PAIN OF LEFT UPPER EXTREMITY: Status: ACTIVE | Noted: 2024-03-21

## 2024-03-21 PROBLEM — M70.60 GREATER TROCHANTERIC BURSITIS: Status: ACTIVE | Noted: 2023-06-02

## 2024-03-21 PROBLEM — R05.9 COUGH: Status: ACTIVE | Noted: 2024-03-21

## 2024-03-21 PROBLEM — M79.602 CHRONIC PAIN OF LEFT UPPER EXTREMITY: Status: ACTIVE | Noted: 2024-03-21

## 2024-03-21 PROBLEM — K63.5 POLYP OF COLON: Status: ACTIVE | Noted: 2023-08-31

## 2024-03-21 PROBLEM — R10.31 RIGHT LOWER QUADRANT ABDOMINAL PAIN: Status: ACTIVE | Noted: 2022-05-12

## 2024-03-21 PROBLEM — U07.1 DISEASE DUE TO SEVERE ACUTE RESPIRATORY SYNDROME CORONAVIRUS 2 (SARS-COV-2): Status: ACTIVE | Noted: 2024-03-21

## 2024-03-21 PROBLEM — I63.81: Status: ACTIVE | Noted: 2023-06-02

## 2024-03-21 PROBLEM — M54.2 CERVICALGIA: Status: ACTIVE | Noted: 2024-02-20

## 2024-03-21 PROBLEM — R42 DIZZINESS AND GIDDINESS: Status: ACTIVE | Noted: 2021-10-15

## 2024-03-21 PROBLEM — T40.411A POISONING BY FENTANYL OR FENTANYL ANALOGS, ACCIDENTAL (UNINTENTIONAL), INITIAL ENCOUNTER (MULTI): Status: ACTIVE | Noted: 2022-05-12

## 2024-03-21 PROBLEM — J44.1 ACUTE EXACERBATION OF CHRONIC OBSTRUCTIVE PULMONARY DISEASE (MULTI): Status: RESOLVED | Noted: 2024-03-21 | Resolved: 2024-03-21

## 2024-03-21 PROBLEM — R07.0 PAIN IN THROAT: Status: ACTIVE | Noted: 2024-03-21

## 2024-03-21 PROBLEM — H92.09 PAIN OF EAR STRUCTURE: Status: ACTIVE | Noted: 2023-08-31

## 2024-03-21 PROBLEM — I10 ESSENTIAL (PRIMARY) HYPERTENSION: Status: ACTIVE | Noted: 2022-05-12

## 2024-03-21 PROBLEM — R60.0 EDEMA OF LOWER EXTREMITY: Status: ACTIVE | Noted: 2023-06-02

## 2024-03-21 PROBLEM — F41.8 MIXED ANXIETY DEPRESSIVE DISORDER: Status: ACTIVE | Noted: 2023-06-02

## 2024-03-21 PROBLEM — G44.1 VASCULAR HEADACHE: Status: ACTIVE | Noted: 2024-03-21

## 2024-03-21 PROBLEM — H51.0 CONJUGATE GAZE PALSY: Status: ACTIVE | Noted: 2024-03-21

## 2024-03-21 PROBLEM — M79.18 MYOFASCIAL PAIN DYSFUNCTION SYNDROME: Status: ACTIVE | Noted: 2024-03-21

## 2024-03-21 PROBLEM — J02.9 SORE THROAT: Status: ACTIVE | Noted: 2024-03-21

## 2024-03-21 PROBLEM — Z20.822 CONTACT WITH AND (SUSPECTED) EXPOSURE TO COVID-19: Status: ACTIVE | Noted: 2022-05-12

## 2024-03-21 PROBLEM — E66.3 OVERWEIGHT WITH BODY MASS INDEX (BMI) 25.0-29.9: Status: ACTIVE | Noted: 2024-03-21

## 2024-03-22 ENCOUNTER — TELEPHONE (OUTPATIENT)
Dept: PRIMARY CARE | Facility: CLINIC | Age: 59
End: 2024-03-22
Payer: COMMERCIAL

## 2024-03-22 DIAGNOSIS — Z72.0 TOBACCO ABUSE: Primary | ICD-10-CM

## 2024-03-22 RX ORDER — BUDESONIDE, GLYCOPYRROLATE, AND FORMOTEROL FUMARATE 160; 9; 4.8 UG/1; UG/1; UG/1
2 AEROSOL, METERED RESPIRATORY (INHALATION)
Qty: 10.7 G | Refills: 11 | Status: SHIPPED | OUTPATIENT
Start: 2024-03-22 | End: 2024-03-28

## 2024-03-22 RX ORDER — FLUTICASONE PROPIONATE 50 MCG
2 SPRAY, SUSPENSION (ML) NASAL DAILY
Qty: 16 G | Refills: 11 | Status: SHIPPED | OUTPATIENT
Start: 2024-03-22

## 2024-03-22 NOTE — TELEPHONE ENCOUNTER
Pt requesting that you change her trelegy inhaler to something else her insurance wont cover it and she hasn't had anything in 2 weeks her sample is gone.

## 2024-03-26 ENCOUNTER — TELEPHONE (OUTPATIENT)
Dept: PRIMARY CARE | Facility: CLINIC | Age: 59
End: 2024-03-26
Payer: COMMERCIAL

## 2024-03-26 DIAGNOSIS — R05.2 SUBACUTE COUGH: Primary | ICD-10-CM

## 2024-03-26 NOTE — TELEPHONE ENCOUNTER
Patient returned our call. Will contact insurance and call back with covered inhaler options for dr. Britt to prescribe

## 2024-03-26 NOTE — TELEPHONE ENCOUNTER
2nd inhaler denial by insurance. Patient needs to contact insurance and find out witch maintenance inhaler her insurance will cover. Left message for patient to contact office

## 2024-03-27 NOTE — TELEPHONE ENCOUNTER
Spoke with insurance. States they will cover Symbicort, flovent, anoro ellipta, dulera or ventolin. Please send in an alternative for her

## 2024-03-28 RX ORDER — BUDESONIDE AND FORMOTEROL FUMARATE DIHYDRATE 80; 4.5 UG/1; UG/1
2 AEROSOL RESPIRATORY (INHALATION)
Qty: 10.2 G | Refills: 5 | Status: SHIPPED | OUTPATIENT
Start: 2024-03-28 | End: 2025-03-28

## 2024-04-04 ENCOUNTER — TELEPHONE (OUTPATIENT)
Dept: PRIMARY CARE | Facility: CLINIC | Age: 59
End: 2024-04-04

## 2024-04-04 ENCOUNTER — APPOINTMENT (OUTPATIENT)
Dept: OBSTETRICS AND GYNECOLOGY | Facility: CLINIC | Age: 59
End: 2024-04-04
Payer: COMMERCIAL

## 2024-04-09 ENCOUNTER — OFFICE VISIT (OUTPATIENT)
Dept: PRIMARY CARE | Facility: CLINIC | Age: 59
End: 2024-04-09
Payer: COMMERCIAL

## 2024-04-09 VITALS
HEART RATE: 109 BPM | HEIGHT: 66 IN | OXYGEN SATURATION: 93 % | BODY MASS INDEX: 27.32 KG/M2 | TEMPERATURE: 98.1 F | DIASTOLIC BLOOD PRESSURE: 70 MMHG | WEIGHT: 170 LBS | SYSTOLIC BLOOD PRESSURE: 120 MMHG

## 2024-04-09 DIAGNOSIS — K13.70 ORAL LESION: Primary | ICD-10-CM

## 2024-04-09 DIAGNOSIS — M54.12 CERVICAL RADICULITIS: ICD-10-CM

## 2024-04-09 PROCEDURE — 99213 OFFICE O/P EST LOW 20 MIN: CPT | Performed by: FAMILY MEDICINE

## 2024-04-09 PROCEDURE — 3074F SYST BP LT 130 MM HG: CPT | Performed by: FAMILY MEDICINE

## 2024-04-09 PROCEDURE — 3078F DIAST BP <80 MM HG: CPT | Performed by: FAMILY MEDICINE

## 2024-04-09 ASSESSMENT — PAIN SCALES - GENERAL: PAINLEVEL: 0-NO PAIN

## 2024-04-17 NOTE — PROGRESS NOTES
Urogynecology  Provider:  Samantha Chacon MD  [phone]      ASSESSMENT AND PLAN:   59 year old female with NGB. The patient has a hx of CVA and concussion.     Diagnoses:  #1 Neurogenic bladder    Plan:  1. NGB  - The patient has previously had a CVA and concussion with subsequent neurogenic bladder deficits. She has tried Oxybutynin in 2022 with failure and behavioral changes without any improvement in her urinary symptoms. The patient has previously received intradetrusor Botox injection of 150 U in August 2023 with overall improvement in her urinary incontinence.   > This patient is not an ideal candidate for OAB medications as she has neurogenic bladder due to neurological deficits from her prior CVA and concussion with failure of medication in the past. However, we discussed that [facility] might not approve of 150 U but rather 100 U of Botox moving forward. The patient is amenable to this plan.   - Placed PA for intravesical Botox injection of 100 U and Sandi Antonio RN will follow up on this.     Follow up in 1-2 weeks with Dr. Chacon for intradetrusor Botox injection of 100 U with cystoscopy.     Scribe Attestation  By signing my name below, I, Quinton Luz, Carrol, attest that this documentation has been prepared under the direction and in the presence of Samantha Chacon MD on 04/18/2024 at 6:14 PM.     Agree with above. I Dr. Chacon, personally performed the services described in the documentation which was scribed virtually and confirm it is both complete and accurate.  Samantha Chacon MD        Problem List Items Addressed This Visit    None          I spent a total of eConsult Time: 20 minutes in face to face and non face to face time.        Samantha Chacon MD      HISTORY OF PRESENT ILLNESS:   59 year old female following up on neurogenic bladder.     Record Review:  - Last visit 2/2024  Assessment:   58 y.o. old female being assessed for OAB and neurogenic bladder      Diagnoses:   #1 OAB  #2 Neurogenic bladder      Plan:   OAB, neurogenic bladder   - S/P August 2023: cystoscopy + intradetrusor Botox injections 150U  - Needing another prior authorization for repeat injections      Follow-up in 1-2 weeks for cystoscopy + intradetrusor Botox injections     - Botox denied as needs an appt. Neurogenic bladder. Has a HX of CVA and concussion     Urinary Symptoms:   - The patient has previously had a CVA and concussion with subsequent neurogenic bladder deficits. She has tried Oxybutynin in 2022 with failure and behavioral changes without any improvement in her urinary symptoms. The patient has previously received intradetrusor Botox injection of 150 U in August 2023 with overall improvement in her urinary incontinence.       Past Medical History:    Past Medical History:   Diagnosis Date    Acute exacerbation of chronic obstructive pulmonary disease (Multi) 03/21/2024    Pain in left hip 04/13/2022    Left hip pain    Right knee pain 06/02/2023    Stroke (cerebrum) (Multi)         Past Surgical History:    Past Surgical History:   Procedure Laterality Date    CT ANGIO NECK  10/14/2021    CT NECK ANGIO W AND WO IV CONTRAST 10/14/2021 [facility] CLINICAL LEGACY    CT HEAD ANGIO W AND WO IV CONTRAST  10/14/2021    CT HEAD ANGIO W AND WO IV CONTRAST 10/14/2021 [facility] CLINICAL LEGACY    MR HEAD ANGIO WO IV CONTRAST  6/2/2020    MR HEAD ANGIO WO IV CONTRAST 6/2/2020 [facility] CLINICAL LEGACY    MR HEAD ANGIO WO IV CONTRAST  8/10/2022    MR HEAD ANGIO WO IV CONTRAST 8/10/2022 [facility] CLINICAL LEGACY    MR HEAD ANGIO WO IV CONTRAST  6/24/2019    MR HEAD ANGIO WO IV CONTRAST [facility] INPATIENT LEGACY    MR NECK ANGIO WO IV CONTRAST  6/2/2020    MR NECK ANGIO WO IV CONTRAST 6/2/2020 [facility] CLINICAL LEGACY    MR NECK ANGIO WO IV CONTRAST  8/10/2022    MR NECK ANGIO WO IV CONTRAST 8/10/2022 [facility] CLINICAL LEGACY    OTHER SURGICAL HISTORY  08/31/2020    Hip replacement       Medications:     Prior to Admission  medications    Medication Sig Start Date End Date Taking? Authorizing Provider   albuterol (Ventolin HFA) 90 mcg/actuation inhaler Inhale 1 to 2 puffs by mouth and into the lungs every 4 to 6 hours if needed. 5/19/23   Vilma Britt MD   aspirin 81 mg EC tablet Take 1 tablet (81 mg) by mouth once daily. 8/6/19   Historical Provider, MD   atorvastatin (Lipitor) 40 mg tablet Take 1 tablet (40 mg) by mouth once daily. 6/19/23   Vilma Britt MD   budesonide-formoteroL (Symbicort) 80-4.5 mcg/actuation inhaler Inhale 2 puffs 2 times a day. Rinse mouth with water after use to reduce aftertaste and incidence of candidiasis. Do not swallow. 3/28/24 3/28/25  Vilma Britt MD   DULoxetine (Cymbalta) 30 mg DR capsule Take 1 capsule (30 mg) by mouth once daily. Do not crush or chew.    Historical Provider, MD   fluticasone (Flonase) 50 mcg/actuation nasal spray Administer 2 sprays into each nostril once daily. 3/22/24   Vilma Britt MD   fluticasone-umeclidin-vilanter (Trelegy Ellipta) 100-62.5-25 mcg blister with device Inhale 1 puff once daily. 2/21/24 8/19/24  William Sprague MD   hydrOXYzine HCL (Atarax) 50 mg tablet Take 1 tablet (50 mg) by mouth 2 times a day.    Historical Provider, MD   nicotine 21-14-7 mg/24 hr patch, TD daily, sequential As directed 1/22/24   Vilma Britt MD   nitrofurantoin, macrocrystal-monohydrate, (Macrobid) 100 mg capsule Take 1 capsule (100 mg) by mouth 2 times a day. 10/13/22   Historical Provider, MD   pregabalin (Lyrica) 150 mg capsule Take 1 capsule (150 mg) by mouth 3 times a day. 1/17/24 2/16/24  Riaz Johnson MD   traZODone (Desyrel) 50 mg tablet Take 1 tablet (50 mg) by mouth once daily at bedtime. 1/15/24   Historical Provider, MD   Vyvanse 40 mg capsule Take 1 capsule (40 mg) by mouth once daily in the morning. Take before meals. 11/9/23   Historical Provider, MD LENZ  Review of Systems   Constitutional: Negative.    HENT: Negative.     Eyes: Negative.     Respiratory: Negative.     Cardiovascular: Negative.    Gastrointestinal: Negative.    Endocrine: Negative.    Genitourinary:  Positive for enuresis.   Musculoskeletal: Negative.    Neurological: Negative.    Psychiatric/Behavioral: Negative.          Blood, Urine   Date Value Ref Range Status   01/22/2024 NEGATIVE NEGATIVE Final     Nitrite, Urine   Date Value Ref Range Status   01/22/2024 NEGATIVE NEGATIVE Final     Urobilinogen, Urine   Date Value Ref Range Status   01/22/2024 <2.0 <2.0 mg/dL Final         PHYSICAL EXAM:    There were no vitals taken for this visit.  No LMP recorded.      Declines chaperone for physical exam.      Well developed, well nourished, in no apparent distress.   Neurologic/Psychiatric:  Awake, Alert and Oriented times 3.  Affect normal.       Data and DIAGNOSTIC STUDIES REVIEWED   Lab Results   Component Value Date    URINECULTURE >100,000 Escherichia coli (A) 12/08/2023      Lab Results   Component Value Date    GLUCOSE 92 12/08/2023    CALCIUM 9.7 12/08/2023     12/08/2023    K 4.0 12/08/2023    CO2 18 (L) 12/08/2023     12/08/2023    BUN 13 12/08/2023    CREATININE 0.70 12/08/2023     Lab Results   Component Value Date    WBC 10.7 12/08/2023    HGB 16.1 (H) 12/08/2023    HCT 49.6 (H) 12/08/2023    MCV 85 12/08/2023     12/08/2023        Samantha Chacon MD

## 2024-04-18 ENCOUNTER — OFFICE VISIT (OUTPATIENT)
Dept: OBSTETRICS AND GYNECOLOGY | Facility: CLINIC | Age: 59
End: 2024-04-18
Payer: COMMERCIAL

## 2024-04-18 VITALS
BODY MASS INDEX: 27.8 KG/M2 | HEIGHT: 66 IN | WEIGHT: 173 LBS | DIASTOLIC BLOOD PRESSURE: 71 MMHG | SYSTOLIC BLOOD PRESSURE: 112 MMHG

## 2024-04-18 DIAGNOSIS — N31.9 NEUROGENIC BLADDER: Primary | ICD-10-CM

## 2024-04-18 PROBLEM — M54.12 CERVICAL RADICULITIS: Status: ACTIVE | Noted: 2024-04-18

## 2024-04-18 PROBLEM — K13.70 ORAL LESION: Status: ACTIVE | Noted: 2024-04-18

## 2024-04-18 PROCEDURE — 3074F SYST BP LT 130 MM HG: CPT | Performed by: OBSTETRICS & GYNECOLOGY

## 2024-04-18 PROCEDURE — 99213 OFFICE O/P EST LOW 20 MIN: CPT | Performed by: OBSTETRICS & GYNECOLOGY

## 2024-04-18 PROCEDURE — 3078F DIAST BP <80 MM HG: CPT | Performed by: OBSTETRICS & GYNECOLOGY

## 2024-04-18 ASSESSMENT — ENCOUNTER SYMPTOMS
COUGH: 0
UNEXPECTED WEIGHT CHANGE: 0
DYSURIA: 0
DIARRHEA: 0
HALLUCINATIONS: 0
BLOOD IN STOOL: 0
FATIGUE: 0
HEMATURIA: 0
NUMBNESS: 0
DECREASED CONCENTRATION: 0
PALPITATIONS: 0
BACK PAIN: 1
SHORTNESS OF BREATH: 0
ARTHRALGIAS: 1
NECK PAIN: 1
NAUSEA: 0
SORE THROAT: 0
DIZZINESS: 0
ABDOMINAL PAIN: 0
TROUBLE SWALLOWING: 0
VOMITING: 0
HEADACHES: 0
FEVER: 0
CONFUSION: 0
EYE PAIN: 0
FREQUENCY: 0
WEAKNESS: 0
JOINT SWELLING: 0

## 2024-04-18 ASSESSMENT — PAIN SCALES - GENERAL: PAINLEVEL: 6

## 2024-04-18 NOTE — PROGRESS NOTES
Subjective   Patient ID: Mona Sanders is a 59 y.o. female.    Patient with a lesion in her mouth.  She has an appointment with an oral surgeon to get it biopsied she just wanted my opinion.  She is a smoker.  She has chronic cervicalIssues in her neck and we discussed.        Review of Systems   Constitutional:  Negative for fatigue, fever and unexpected weight change.   HENT:  Negative for congestion, ear pain, hearing loss, sore throat and trouble swallowing.    Eyes:  Negative for pain and visual disturbance.   Respiratory:  Negative for cough and shortness of breath.    Cardiovascular:  Negative for chest pain, palpitations and leg swelling.   Gastrointestinal:  Negative for abdominal pain, blood in stool, diarrhea, nausea and vomiting.   Genitourinary:  Negative for dysuria, frequency, hematuria and urgency.   Musculoskeletal:  Positive for arthralgias, back pain and neck pain. Negative for joint swelling.   Skin:  Negative for pallor and rash.   Neurological:  Negative for dizziness, syncope, weakness, numbness and headaches.   Psychiatric/Behavioral:  Negative for confusion, decreased concentration, hallucinations and suicidal ideas.      Vitals:    04/09/24 1308   BP: 120/70   Pulse: 109   Temp: 36.7 °C (98.1 °F)   SpO2: 93%      Objective   Physical Exam  Constitutional:       General: She is not in acute distress.     Appearance: Normal appearance. She is not toxic-appearing.   HENT:      Mouth/Throat:      Comments: There is a verrucous, pedunculated whitish lesion on the left side of the buccal mucosa.  Neurological:      Mental Status: She is alert.         Assessment/Plan   Diagnoses and all orders for this visit:  Oral lesion  Cervical radiculitis

## 2024-04-18 NOTE — PATIENT INSTRUCTIONS
Examine lesion, it does look highly suspicious for neoplasm.  I highly recommend she follow-up with the specialist and have a biopsy.  Also encouraged her to follow-up with her specialist for her neck.  Smoking cessation discussed.

## 2024-04-25 ASSESSMENT — ENCOUNTER SYMPTOMS
CARDIOVASCULAR NEGATIVE: 1
CONSTITUTIONAL NEGATIVE: 1
MUSCULOSKELETAL NEGATIVE: 1
NEUROLOGICAL NEGATIVE: 1
RESPIRATORY NEGATIVE: 1
PSYCHIATRIC NEGATIVE: 1
ENDOCRINE NEGATIVE: 1
GASTROINTESTINAL NEGATIVE: 1
EYES NEGATIVE: 1

## 2024-05-07 ENCOUNTER — APPOINTMENT (OUTPATIENT)
Dept: OBSTETRICS AND GYNECOLOGY | Facility: CLINIC | Age: 59
End: 2024-05-07
Payer: COMMERCIAL

## 2024-05-30 ENCOUNTER — APPOINTMENT (OUTPATIENT)
Dept: PRIMARY CARE | Facility: CLINIC | Age: 59
End: 2024-05-30
Payer: COMMERCIAL

## 2024-06-25 DIAGNOSIS — E78.5 HYPERLIPIDEMIA, UNSPECIFIED HYPERLIPIDEMIA TYPE: ICD-10-CM

## 2024-06-26 RX ORDER — ATORVASTATIN CALCIUM 40 MG/1
40 TABLET, FILM COATED ORAL DAILY
Qty: 180 TABLET | Refills: 0 | Status: SHIPPED | OUTPATIENT
Start: 2024-06-26

## 2024-07-02 ENCOUNTER — TELEPHONE (OUTPATIENT)
Dept: PRIMARY CARE | Facility: CLINIC | Age: 59
End: 2024-07-02

## 2024-07-02 ENCOUNTER — APPOINTMENT (OUTPATIENT)
Dept: PRIMARY CARE | Facility: CLINIC | Age: 59
End: 2024-07-02
Payer: COMMERCIAL

## 2024-07-02 VITALS
DIASTOLIC BLOOD PRESSURE: 70 MMHG | SYSTOLIC BLOOD PRESSURE: 120 MMHG | BODY MASS INDEX: 26.95 KG/M2 | TEMPERATURE: 96.4 F | OXYGEN SATURATION: 94 % | WEIGHT: 167 LBS | HEART RATE: 114 BPM

## 2024-07-02 DIAGNOSIS — Z00.00 ROUTINE GENERAL MEDICAL EXAMINATION AT A HEALTH CARE FACILITY: Primary | ICD-10-CM

## 2024-07-02 DIAGNOSIS — F17.211 CIGARETTE NICOTINE DEPENDENCE IN REMISSION: ICD-10-CM

## 2024-07-02 DIAGNOSIS — Z12.11 COLON CANCER SCREENING: ICD-10-CM

## 2024-07-02 PROCEDURE — 3078F DIAST BP <80 MM HG: CPT | Performed by: FAMILY MEDICINE

## 2024-07-02 PROCEDURE — 3074F SYST BP LT 130 MM HG: CPT | Performed by: FAMILY MEDICINE

## 2024-07-02 PROCEDURE — 99396 PREV VISIT EST AGE 40-64: CPT | Performed by: FAMILY MEDICINE

## 2024-07-02 ASSESSMENT — PAIN SCALES - GENERAL: PAINLEVEL: 0-NO PAIN

## 2024-07-02 NOTE — H&P (VIEW-ONLY)
Subjective   Patient ID: Mona Sanders is a 59 y.o. female.    Mona Sanders comes in today for physical exam.  There has been no chest pain, shortness of breath, fever, chills, unexplained weight loss, rectal bleeding or any other unusual symptoms.  Recent labs, diagnostics and pertinent information has been reviewed. Patient has history of cervical spine disorder.  We reviewed medication list.  She sees pain management.Patient has history of cervical spine disorder.  We reviewed medication list.  She sees pain management.Patient is attempting to eat a healthy diet and incorporate exercise in to their lifestyle. Patient does not smoke. Alcohol in moderation. Patient is practicing routine eye and dental care. Reviewed employment status. No uncontrolled anxiety, depression. Reviewed current medications, if any.          Review of Systems   Constitutional:  Negative for fatigue, fever and unexpected weight change.   HENT:  Negative for congestion, ear pain, hearing loss, sore throat and trouble swallowing.    Eyes:  Negative for pain and visual disturbance.   Respiratory:  Negative for cough and shortness of breath.    Cardiovascular:  Positive for palpitations. Negative for chest pain and leg swelling.   Gastrointestinal:  Negative for abdominal pain, blood in stool, diarrhea, nausea and vomiting.   Genitourinary:  Negative for dysuria, frequency, hematuria and urgency.   Musculoskeletal:  Positive for myalgias, neck pain and neck stiffness. Negative for joint swelling.   Skin:  Negative for pallor and rash.   Neurological:  Negative for dizziness, syncope, weakness, numbness and headaches.   Psychiatric/Behavioral:  Positive for dysphoric mood. Negative for confusion, decreased concentration, hallucinations and suicidal ideas. The patient is nervous/anxious.      Vitals:    07/02/24 1514   BP: 120/70   Pulse: (!) 114   Temp: 35.8 °C (96.4 °F)   SpO2: 94%      Objective   Physical Exam  Constitutional:        Appearance: Normal appearance.   HENT:      Head: Normocephalic and atraumatic.      Right Ear: Tympanic membrane and external ear normal.      Left Ear: Tympanic membrane and external ear normal.      Nose: Nose normal.      Mouth/Throat:      Mouth: Mucous membranes are moist.      Pharynx: Oropharynx is clear. No oropharyngeal exudate.   Eyes:      Extraocular Movements: Extraocular movements intact.      Conjunctiva/sclera: Conjunctivae normal.      Pupils: Pupils are equal, round, and reactive to light.   Cardiovascular:      Rate and Rhythm: Normal rate and regular rhythm.      Heart sounds: Normal heart sounds.   Pulmonary:      Effort: Pulmonary effort is normal.      Breath sounds: Normal breath sounds.   Abdominal:      General: Abdomen is flat.      Palpations: Abdomen is soft. There is no mass.      Tenderness: There is no abdominal tenderness. There is no guarding.   Musculoskeletal:      Cervical back: Neck supple.   Lymphadenopathy:      Cervical: No cervical adenopathy.   Skin:     General: Skin is warm and dry.   Neurological:      General: No focal deficit present.      Mental Status: She is alert.   Psychiatric:         Mood and Affect: Mood normal.         Speech: Speech normal.         Behavior: Behavior normal.         Cognition and Memory: Cognition normal.       Assessment/Plan   There are no diagnoses linked to this encounter.

## 2024-07-02 NOTE — PATIENT INSTRUCTIONS
It was nice to see you today!  Discussed current concerns and addressed   Reviewed recent labs and diagnostics  Reviewed medications list  Continue to eat a healthy diet, exercise at least 3 times a week or more  Plan and follow up discussed  For any further information related to your condition, copy and paste or go to [email or web address]  Work on smoking cessation

## 2024-07-16 PROBLEM — Z00.00 ROUTINE GENERAL MEDICAL EXAMINATION AT A HEALTH CARE FACILITY: Status: ACTIVE | Noted: 2024-07-16

## 2024-07-16 PROBLEM — Z12.11 COLON CANCER SCREENING: Status: ACTIVE | Noted: 2024-07-16

## 2024-07-16 ASSESSMENT — ENCOUNTER SYMPTOMS
UNEXPECTED WEIGHT CHANGE: 0
JOINT SWELLING: 0
MYALGIAS: 1
FREQUENCY: 0
SHORTNESS OF BREATH: 0
DIZZINESS: 0
NECK PAIN: 1
NUMBNESS: 0
HEMATURIA: 0
DECREASED CONCENTRATION: 0
SORE THROAT: 0
CONFUSION: 0
FEVER: 0
TROUBLE SWALLOWING: 0
DYSURIA: 0
EYE PAIN: 0
ABDOMINAL PAIN: 0
NERVOUS/ANXIOUS: 1
HEADACHES: 0
FATIGUE: 0
HALLUCINATIONS: 0
BLOOD IN STOOL: 0
WEAKNESS: 0
NAUSEA: 0
PALPITATIONS: 1
VOMITING: 0
NECK STIFFNESS: 1
COUGH: 0
DYSPHORIC MOOD: 1
DIARRHEA: 0

## 2024-07-21 RX ORDER — LIDOCAINE HYDROCHLORIDE 20 MG/ML
1 JELLY TOPICAL ONCE
Status: COMPLETED | OUTPATIENT
Start: 2024-07-22 | End: 2024-07-22

## 2024-07-21 RX ORDER — NITROFURANTOIN 25; 75 MG/1; MG/1
100 CAPSULE ORAL ONCE
Status: COMPLETED | OUTPATIENT
Start: 2024-07-22 | End: 2024-07-22

## 2024-07-21 RX ORDER — PHENAZOPYRIDINE HYDROCHLORIDE 200 MG/1
200 TABLET, FILM COATED ORAL ONCE
Status: COMPLETED | OUTPATIENT
Start: 2024-07-22 | End: 2024-07-22

## 2024-07-21 RX ORDER — SODIUM CHLORIDE 9 MG/ML
10 INJECTION, SOLUTION INTRAMUSCULAR; INTRAVENOUS; SUBCUTANEOUS ONCE
Status: COMPLETED | OUTPATIENT
Start: 2024-07-22 | End: 2024-07-22

## 2024-07-21 RX ORDER — LIDOCAINE HYDROCHLORIDE 10 MG/ML
5 INJECTION INFILTRATION; PERINEURAL ONCE
Status: COMPLETED | OUTPATIENT
Start: 2024-07-22 | End: 2024-07-22

## 2024-07-21 RX ORDER — NITROFURANTOIN 25; 75 MG/1; MG/1
100 CAPSULE ORAL 2 TIMES DAILY
Qty: 5 CAPSULE | Refills: 0 | Status: SHIPPED | OUTPATIENT
Start: 2024-07-22 | End: 2024-07-25

## 2024-07-22 ENCOUNTER — PROCEDURE VISIT (OUTPATIENT)
Dept: OBSTETRICS AND GYNECOLOGY | Facility: CLINIC | Age: 59
End: 2024-07-22
Payer: COMMERCIAL

## 2024-07-22 VITALS
BODY MASS INDEX: 27.97 KG/M2 | HEIGHT: 66 IN | SYSTOLIC BLOOD PRESSURE: 101 MMHG | HEART RATE: 91 BPM | DIASTOLIC BLOOD PRESSURE: 74 MMHG | WEIGHT: 174 LBS

## 2024-07-22 DIAGNOSIS — N31.9 NEUROGENIC BLADDER: ICD-10-CM

## 2024-07-22 LAB
POC APPEARANCE, URINE: CLEAR
POC BILIRUBIN, URINE: NEGATIVE
POC BLOOD, URINE: NEGATIVE
POC COLOR, URINE: YELLOW
POC GLUCOSE, URINE: NEGATIVE MG/DL
POC KETONES, URINE: NEGATIVE MG/DL
POC LEUKOCYTES, URINE: NEGATIVE
POC NITRITE,URINE: NEGATIVE
POC PH, URINE: 7.5 PH
POC PROTEIN, URINE: NEGATIVE MG/DL
POC SPECIFIC GRAVITY, URINE: 1.01
POC UROBILINOGEN, URINE: 0.2 EU/DL

## 2024-07-22 PROCEDURE — 99213 OFFICE O/P EST LOW 20 MIN: CPT | Performed by: OBSTETRICS & GYNECOLOGY

## 2024-07-22 PROCEDURE — 2500000002 HC RX 250 W HCPCS SELF ADMINISTERED DRUGS (ALT 637 FOR MEDICARE OP, ALT 636 FOR OP/ED): Performed by: OBSTETRICS & GYNECOLOGY

## 2024-07-22 PROCEDURE — 52287 CYSTOSCOPY CHEMODENERVATION: CPT | Performed by: OBSTETRICS & GYNECOLOGY

## 2024-07-22 PROCEDURE — 81003 URINALYSIS AUTO W/O SCOPE: CPT | Performed by: OBSTETRICS & GYNECOLOGY

## 2024-07-22 PROCEDURE — 96372 THER/PROPH/DIAG INJ SC/IM: CPT | Mod: 59 | Performed by: OBSTETRICS & GYNECOLOGY

## 2024-07-22 PROCEDURE — 2500000005 HC RX 250 GENERAL PHARMACY W/O HCPCS: Performed by: OBSTETRICS & GYNECOLOGY

## 2024-07-22 PROCEDURE — 2500000004 HC RX 250 GENERAL PHARMACY W/ HCPCS (ALT 636 FOR OP/ED): Mod: JZ | Performed by: OBSTETRICS & GYNECOLOGY

## 2024-07-22 ASSESSMENT — PAIN SCALES - GENERAL: PAINLEVEL: 0-NO PAIN

## 2024-07-22 NOTE — PROGRESS NOTES
HPI:  Pt here for intradetrusor Botox injection    Last visit 4/2024  S/p 150 U 8/2023. Has worked well for her    The patient gave a urine sample which was checked for infection and found to be negative.  Pt was numbed for 20 min with lidojet inserted in to the bladder and given 100 mg po pyridium.    The patient was consented for cytoscopic intradetrusor Botox injection.     100   Units was reconstituted in  10   mL of NS  Using a flexible scope injection was performed at 4 sites using the Laborie needle at a 3 mm depth starting in the midline just above the intraureteric ridge and then to the left and right of this site and then just above until the entire drug volume was injected followed by 1 mL NS flush.    No bleeding was noted    The procedure was completed, the patient allowed to empty her bladder and get dressed.    Post-procedure precautions were given to the patient and macrobid 100 mg po BID x 3 days script sent.    She will follow-up for repeat injection in 5 months. May need to PA for higher dose if not lower 100 U not effective at shorter interval.    STM

## 2024-07-29 ENCOUNTER — ANESTHESIA EVENT (OUTPATIENT)
Dept: OPERATING ROOM | Facility: HOSPITAL | Age: 59
End: 2024-07-29
Payer: COMMERCIAL

## 2024-07-30 ENCOUNTER — ANESTHESIA (OUTPATIENT)
Dept: OPERATING ROOM | Facility: HOSPITAL | Age: 59
End: 2024-07-30
Payer: COMMERCIAL

## 2024-07-30 ENCOUNTER — HOSPITAL ENCOUNTER (OUTPATIENT)
Dept: OPERATING ROOM | Facility: HOSPITAL | Age: 59
Setting detail: OUTPATIENT SURGERY
Discharge: HOME | End: 2024-07-30
Payer: COMMERCIAL

## 2024-07-30 VITALS
HEART RATE: 80 BPM | WEIGHT: 167.55 LBS | OXYGEN SATURATION: 97 % | RESPIRATION RATE: 16 BRPM | SYSTOLIC BLOOD PRESSURE: 114 MMHG | BODY MASS INDEX: 26.93 KG/M2 | DIASTOLIC BLOOD PRESSURE: 79 MMHG | HEIGHT: 66 IN | TEMPERATURE: 97.2 F

## 2024-07-30 DIAGNOSIS — Z12.11 COLON CANCER SCREENING: ICD-10-CM

## 2024-07-30 PROCEDURE — 3700000002 HC GENERAL ANESTHESIA TIME - EACH INCREMENTAL 1 MINUTE: Performed by: ANESTHESIOLOGY

## 2024-07-30 PROCEDURE — 2500000004 HC RX 250 GENERAL PHARMACY W/ HCPCS (ALT 636 FOR OP/ED): Performed by: NURSE ANESTHETIST, CERTIFIED REGISTERED

## 2024-07-30 PROCEDURE — A45378 PR COLONOSCOPY,DIAGNOSTIC: Performed by: ANESTHESIOLOGY

## 2024-07-30 PROCEDURE — 2500000005 HC RX 250 GENERAL PHARMACY W/O HCPCS: Performed by: NURSE ANESTHETIST, CERTIFIED REGISTERED

## 2024-07-30 PROCEDURE — A45378 PR COLONOSCOPY,DIAGNOSTIC: Performed by: NURSE ANESTHETIST, CERTIFIED REGISTERED

## 2024-07-30 PROCEDURE — 3700000001 HC GENERAL ANESTHESIA TIME - INITIAL BASE CHARGE: Performed by: ANESTHESIOLOGY

## 2024-07-30 PROCEDURE — 3600000002 HC OR TIME - INITIAL BASE CHARGE - PROCEDURE LEVEL TWO: Performed by: ANESTHESIOLOGY

## 2024-07-30 PROCEDURE — 2500000004 HC RX 250 GENERAL PHARMACY W/ HCPCS (ALT 636 FOR OP/ED): Performed by: ANESTHESIOLOGY

## 2024-07-30 PROCEDURE — 3600000007 HC OR TIME - EACH INCREMENTAL 1 MINUTE - PROCEDURE LEVEL TWO: Performed by: ANESTHESIOLOGY

## 2024-07-30 PROCEDURE — 7100000009 HC PHASE TWO TIME - INITIAL BASE CHARGE: Performed by: ANESTHESIOLOGY

## 2024-07-30 PROCEDURE — 45380 COLONOSCOPY AND BIOPSY: CPT | Performed by: SURGERY

## 2024-07-30 PROCEDURE — 7100000010 HC PHASE TWO TIME - EACH INCREMENTAL 1 MINUTE: Performed by: ANESTHESIOLOGY

## 2024-07-30 RX ORDER — SODIUM CHLORIDE, SODIUM LACTATE, POTASSIUM CHLORIDE, CALCIUM CHLORIDE 600; 310; 30; 20 MG/100ML; MG/100ML; MG/100ML; MG/100ML
100 INJECTION, SOLUTION INTRAVENOUS CONTINUOUS
Status: DISCONTINUED | OUTPATIENT
Start: 2024-07-30 | End: 2024-07-31 | Stop reason: HOSPADM

## 2024-07-30 RX ORDER — MIDAZOLAM HYDROCHLORIDE 1 MG/ML
INJECTION INTRAMUSCULAR; INTRAVENOUS AS NEEDED
Status: DISCONTINUED | OUTPATIENT
Start: 2024-07-30 | End: 2024-07-30

## 2024-07-30 RX ORDER — PROPOFOL 10 MG/ML
INJECTION, EMULSION INTRAVENOUS AS NEEDED
Status: DISCONTINUED | OUTPATIENT
Start: 2024-07-30 | End: 2024-07-30

## 2024-07-30 RX ORDER — ONDANSETRON HYDROCHLORIDE 2 MG/ML
4 INJECTION, SOLUTION INTRAVENOUS ONCE AS NEEDED
Status: DISCONTINUED | OUTPATIENT
Start: 2024-07-30 | End: 2024-07-31 | Stop reason: HOSPADM

## 2024-07-30 RX ORDER — LIDOCAINE HYDROCHLORIDE 10 MG/ML
INJECTION, SOLUTION EPIDURAL; INFILTRATION; INTRACAUDAL; PERINEURAL AS NEEDED
Status: DISCONTINUED | OUTPATIENT
Start: 2024-07-30 | End: 2024-07-30

## 2024-07-30 RX ORDER — FENTANYL CITRATE 50 UG/ML
INJECTION, SOLUTION INTRAMUSCULAR; INTRAVENOUS AS NEEDED
Status: DISCONTINUED | OUTPATIENT
Start: 2024-07-30 | End: 2024-07-30

## 2024-07-30 RX ORDER — OXYCODONE HYDROCHLORIDE 5 MG/1
5 TABLET ORAL EVERY 4 HOURS PRN
Status: DISCONTINUED | OUTPATIENT
Start: 2024-07-30 | End: 2024-07-31 | Stop reason: HOSPADM

## 2024-07-30 RX ORDER — DROPERIDOL 2.5 MG/ML
0.62 INJECTION, SOLUTION INTRAMUSCULAR; INTRAVENOUS ONCE AS NEEDED
Status: DISCONTINUED | OUTPATIENT
Start: 2024-07-30 | End: 2024-07-31 | Stop reason: HOSPADM

## 2024-07-30 SDOH — HEALTH STABILITY: MENTAL HEALTH: CURRENT SMOKER: 1

## 2024-07-30 ASSESSMENT — PAIN SCALES - GENERAL
PAINLEVEL_OUTOF10: 0 - NO PAIN
PAINLEVEL_OUTOF10: 0 - NO PAIN

## 2024-07-30 ASSESSMENT — PAIN - FUNCTIONAL ASSESSMENT
PAIN_FUNCTIONAL_ASSESSMENT: 0-10
PAIN_FUNCTIONAL_ASSESSMENT: 0-10

## 2024-07-30 NOTE — ANESTHESIA PREPROCEDURE EVALUATION
Patient: Mona Sanders    Procedure Information       Date/Time: 07/30/24 0840    Scheduled providers: Leonidas SANCHEZ MD; Xander Yu MD    Procedure: COLONOSCOPY    Location: [facility] OR        re    Relevant Problems   Cardiac   (+) Atrial fibrillation (Multi)   (+) Essential (primary) hypertension   (+) Hyperlipidemia      Neuro   (+) Bilateral occipital neuralgia   (+) Cerebrovascular accident (CVA) (Multi)   (+) Cervical radiculitis   (+) Depression with anxiety   (+) Mixed anxiety depressive disorder   (+) Sciatic pain      /Renal   (+) Calculus of kidney      Hematology   (+) Anemia   (+) Iron deficiency anemia      Musculoskeletal   (+) Cervical spondylosis without myelopathy   (+) Chronic neck pain   (+) Lumbosacral spondylosis without myelopathy   (+) Osteoarthritis of hip      ID   (+) Disease due to severe acute respiratory syndrome coronavirus 2 (SARS-CoV-2)       Clinical information reviewed:       Med Hx  Surg Hx            NPO Detail:  No data recorded     Physical Exam    Airway  Mallampati: II  TM distance: >3 FB     Cardiovascular - normal exam     Dental   (+) upper dentures, lower dentures     Pulmonary   Breath sounds clear to auscultation     Abdominal   Abdomen: soft  Bowel sounds: normal           Anesthesia Plan    History of general anesthesia?: yes  History of complications of general anesthesia?: no    ASA 3     MAC     The patient is a current smoker.  Patient was not previously instructed to abstain from smoking on day of procedure.  Patient did not smoke on day of procedure.  Education provided regarding risk of obstructive sleep apnea.  intravenous induction   Anesthetic plan and risks discussed with patient.  Use of blood products discussed with who consented to blood products.    Plan discussed with CRNA.

## 2024-07-30 NOTE — ANESTHESIA POSTPROCEDURE EVALUATION
Patient: Mona Sanders    Procedure Summary       Date: 07/30/24 Room / Location: [facility] OR    Anesthesia Start: 0832 Anesthesia Stop: 0913    Procedure: COLONOSCOPY Diagnosis: Colon cancer screening    Scheduled Providers: Leonidas SANCHEZ MD; Xander Yu MD Responsible Provider: Xander Yu MD    Anesthesia Type: MAC ASA Status: 3            Anesthesia Type: MAC    Vitals Value Taken Time   BP See vitals 07/30/24 0915   Temp  07/30/24 0915   Pulse  07/30/24 0915   Resp  07/30/24 0915   SpO2  07/30/24 0915       Anesthesia Post Evaluation    Patient location during evaluation: PACU  Patient participation: complete - patient participated  Level of consciousness: awake  Pain management: adequate  Multimodal analgesia pain management approach  Airway patency: patent  Two or more strategies used to mitigate risk of obstructive sleep apnea  Cardiovascular status: acceptable  Respiratory status: acceptable  Hydration status: acceptable  Postoperative Nausea and Vomiting: none        No notable events documented.

## 2024-08-08 LAB
LABORATORY COMMENT REPORT: NORMAL
PATH REPORT.FINAL DX SPEC: NORMAL
PATH REPORT.GROSS SPEC: NORMAL
PATH REPORT.TOTAL CANCER: NORMAL

## 2024-08-19 ENCOUNTER — TELEPHONE (OUTPATIENT)
Dept: PRIMARY CARE | Facility: CLINIC | Age: 59
End: 2024-08-19
Payer: COMMERCIAL

## 2024-08-19 NOTE — TELEPHONE ENCOUNTER
Requesting a call for test results for colonoscopy and a refill for [facility]erol inhaler [facility] [place]

## 2024-08-19 NOTE — TELEPHONE ENCOUNTER
Requesting a call for test results for colonoscopy and a [facility]ol [facility] [place]

## 2024-08-21 DIAGNOSIS — R06.02 SHORTNESS OF BREATH: ICD-10-CM

## 2024-08-21 RX ORDER — ALBUTEROL SULFATE 90 UG/1
INHALANT RESPIRATORY (INHALATION)
Qty: 18 G | Refills: 3 | Status: SHIPPED | OUTPATIENT
Start: 2024-08-21

## 2024-08-26 DIAGNOSIS — R06.02 SHORTNESS OF BREATH: ICD-10-CM

## 2024-08-26 RX ORDER — ALBUTEROL SULFATE 90 UG/1
INHALANT RESPIRATORY (INHALATION)
Qty: 18 G | Refills: 3 | Status: SHIPPED | OUTPATIENT
Start: 2024-08-26

## 2024-08-27 ENCOUNTER — APPOINTMENT (OUTPATIENT)
Dept: PRIMARY CARE | Facility: CLINIC | Age: 59
End: 2024-08-27
Payer: COMMERCIAL

## 2024-10-17 ENCOUNTER — TELEPHONE (OUTPATIENT)
Dept: PRIMARY CARE | Facility: CLINIC | Age: 59
End: 2024-10-17
Payer: COMMERCIAL

## 2024-10-17 ENCOUNTER — APPOINTMENT (OUTPATIENT)
Dept: RADIOLOGY | Facility: HOSPITAL | Age: 59
End: 2024-10-17
Payer: COMMERCIAL

## 2024-10-17 ENCOUNTER — APPOINTMENT (OUTPATIENT)
Dept: CARDIOLOGY | Facility: HOSPITAL | Age: 59
End: 2024-10-17
Payer: COMMERCIAL

## 2024-10-17 ENCOUNTER — HOSPITAL ENCOUNTER (INPATIENT)
Facility: HOSPITAL | Age: 59
End: 2024-10-17
Attending: STUDENT IN AN ORGANIZED HEALTH CARE EDUCATION/TRAINING PROGRAM | Admitting: STUDENT IN AN ORGANIZED HEALTH CARE EDUCATION/TRAINING PROGRAM
Payer: COMMERCIAL

## 2024-10-17 DIAGNOSIS — U07.1 COVID: ICD-10-CM

## 2024-10-17 DIAGNOSIS — R26.89 IMBALANCE DUE TO OLD STROKE: ICD-10-CM

## 2024-10-17 DIAGNOSIS — J44.1 COPD EXACERBATION (MULTI): ICD-10-CM

## 2024-10-17 DIAGNOSIS — R42 DIZZINESS: ICD-10-CM

## 2024-10-17 DIAGNOSIS — R09.02 HYPOXIA: ICD-10-CM

## 2024-10-17 DIAGNOSIS — R00.0 TACHYCARDIA: ICD-10-CM

## 2024-10-17 DIAGNOSIS — G43.909 MIGRAINE WITHOUT STATUS MIGRAINOSUS, NOT INTRACTABLE, UNSPECIFIED MIGRAINE TYPE: Primary | ICD-10-CM

## 2024-10-17 DIAGNOSIS — M54.12 CERVICAL RADICULITIS: ICD-10-CM

## 2024-10-17 DIAGNOSIS — I69.398 IMBALANCE DUE TO OLD STROKE: ICD-10-CM

## 2024-10-17 LAB
ALBUMIN SERPL BCP-MCNC: 4 G/DL (ref 3.4–5)
ALP SERPL-CCNC: 91 U/L (ref 33–136)
ALT SERPL W P-5'-P-CCNC: 22 U/L (ref 7–45)
ANION GAP SERPL CALCULATED.3IONS-SCNC: 15 MMOL/L (ref 10–20)
APPEARANCE UR: ABNORMAL
AST SERPL W P-5'-P-CCNC: 28 U/L (ref 9–39)
BACTERIA #/AREA URNS AUTO: ABNORMAL /HPF
BASE EXCESS BLDV CALC-SCNC: 4 MMOL/L (ref -2–3)
BASOPHILS # BLD AUTO: 0.03 X10*3/UL (ref 0–0.1)
BASOPHILS NFR BLD AUTO: 0.4 %
BILIRUB SERPL-MCNC: 0.3 MG/DL (ref 0–1.2)
BILIRUB UR STRIP.AUTO-MCNC: NEGATIVE MG/DL
BODY TEMPERATURE: 37 DEGREES CELSIUS
BUN SERPL-MCNC: 7 MG/DL (ref 6–23)
CALCIUM SERPL-MCNC: 9 MG/DL (ref 8.6–10.3)
CHLORIDE SERPL-SCNC: 100 MMOL/L (ref 98–107)
CO2 SERPL-SCNC: 26 MMOL/L (ref 21–32)
COLOR UR: ABNORMAL
CREAT SERPL-MCNC: 0.88 MG/DL (ref 0.5–1.05)
EGFRCR SERPLBLD CKD-EPI 2021: 75 ML/MIN/1.73M*2
EOSINOPHIL # BLD AUTO: 0 X10*3/UL (ref 0–0.7)
EOSINOPHIL NFR BLD AUTO: 0 %
ERYTHROCYTE [DISTWIDTH] IN BLOOD BY AUTOMATED COUNT: 15.9 % (ref 11.5–14.5)
GLUCOSE BLD MANUAL STRIP-MCNC: 89 MG/DL (ref 74–99)
GLUCOSE SERPL-MCNC: 102 MG/DL (ref 74–99)
GLUCOSE UR STRIP.AUTO-MCNC: NORMAL MG/DL
HCO3 BLDV-SCNC: 29.7 MMOL/L (ref 22–26)
HCT VFR BLD AUTO: 42.5 % (ref 36–46)
HGB BLD-MCNC: 13.8 G/DL (ref 12–16)
IMM GRANULOCYTES # BLD AUTO: 0.03 X10*3/UL (ref 0–0.7)
IMM GRANULOCYTES NFR BLD AUTO: 0.4 % (ref 0–0.9)
INHALED O2 CONCENTRATION: 95 %
KETONES UR STRIP.AUTO-MCNC: NEGATIVE MG/DL
LEUKOCYTE ESTERASE UR QL STRIP.AUTO: ABNORMAL
LYMPHOCYTES # BLD AUTO: 1.11 X10*3/UL (ref 1.2–4.8)
LYMPHOCYTES NFR BLD AUTO: 16.6 %
MAGNESIUM SERPL-MCNC: 1.7 MG/DL (ref 1.6–2.4)
MCH RBC QN AUTO: 26.6 PG (ref 26–34)
MCHC RBC AUTO-ENTMCNC: 32.5 G/DL (ref 32–36)
MCV RBC AUTO: 82 FL (ref 80–100)
MONOCYTES # BLD AUTO: 0.89 X10*3/UL (ref 0.1–1)
MONOCYTES NFR BLD AUTO: 13.3 %
MUCOUS THREADS #/AREA URNS AUTO: ABNORMAL /LPF
NEUTROPHILS # BLD AUTO: 4.64 X10*3/UL (ref 1.2–7.7)
NEUTROPHILS NFR BLD AUTO: 69.3 %
NITRITE UR QL STRIP.AUTO: NEGATIVE
NRBC BLD-RTO: 0 /100 WBCS (ref 0–0)
OXYHGB MFR BLDV: 36.7 % (ref 45–75)
PCO2 BLDV: 48 MM HG (ref 41–51)
PH BLDV: 7.4 PH (ref 7.33–7.43)
PH UR STRIP.AUTO: 5.5 [PH]
PLATELET # BLD AUTO: 201 X10*3/UL (ref 150–450)
PO2 BLDV: 30 MM HG (ref 35–45)
POTASSIUM SERPL-SCNC: 3.5 MMOL/L (ref 3.5–5.3)
PROT SERPL-MCNC: 6.5 G/DL (ref 6.4–8.2)
PROT UR STRIP.AUTO-MCNC: ABNORMAL MG/DL
RBC # BLD AUTO: 5.18 X10*6/UL (ref 4–5.2)
RBC # UR STRIP.AUTO: NEGATIVE /UL
RBC #/AREA URNS AUTO: ABNORMAL /HPF
SAO2 % BLDV: 38 % (ref 45–75)
SODIUM SERPL-SCNC: 137 MMOL/L (ref 136–145)
SP GR UR STRIP.AUTO: 1.01
SQUAMOUS #/AREA URNS AUTO: ABNORMAL /HPF
UROBILINOGEN UR STRIP.AUTO-MCNC: NORMAL MG/DL
WBC # BLD AUTO: 6.7 X10*3/UL (ref 4.4–11.3)
WBC #/AREA URNS AUTO: >50 /HPF
WBC CLUMPS #/AREA URNS AUTO: ABNORMAL /HPF

## 2024-10-17 PROCEDURE — 2500000004 HC RX 250 GENERAL PHARMACY W/ HCPCS (ALT 636 FOR OP/ED): Performed by: STUDENT IN AN ORGANIZED HEALTH CARE EDUCATION/TRAINING PROGRAM

## 2024-10-17 PROCEDURE — 71046 X-RAY EXAM CHEST 2 VIEWS: CPT | Performed by: RADIOLOGY

## 2024-10-17 PROCEDURE — 85025 COMPLETE CBC W/AUTO DIFF WBC: CPT | Performed by: STUDENT IN AN ORGANIZED HEALTH CARE EDUCATION/TRAINING PROGRAM

## 2024-10-17 PROCEDURE — 93010 ELECTROCARDIOGRAM REPORT: CPT | Performed by: INTERNAL MEDICINE

## 2024-10-17 PROCEDURE — 83735 ASSAY OF MAGNESIUM: CPT | Performed by: STUDENT IN AN ORGANIZED HEALTH CARE EDUCATION/TRAINING PROGRAM

## 2024-10-17 PROCEDURE — 2500000001 HC RX 250 WO HCPCS SELF ADMINISTERED DRUGS (ALT 637 FOR MEDICARE OP): Performed by: STUDENT IN AN ORGANIZED HEALTH CARE EDUCATION/TRAINING PROGRAM

## 2024-10-17 PROCEDURE — 70496 CT ANGIOGRAPHY HEAD: CPT | Performed by: STUDENT IN AN ORGANIZED HEALTH CARE EDUCATION/TRAINING PROGRAM

## 2024-10-17 PROCEDURE — 82947 ASSAY GLUCOSE BLOOD QUANT: CPT

## 2024-10-17 PROCEDURE — 81001 URINALYSIS AUTO W/SCOPE: CPT | Performed by: STUDENT IN AN ORGANIZED HEALTH CARE EDUCATION/TRAINING PROGRAM

## 2024-10-17 PROCEDURE — 99285 EMERGENCY DEPT VISIT HI MDM: CPT

## 2024-10-17 PROCEDURE — 80053 COMPREHEN METABOLIC PANEL: CPT | Performed by: STUDENT IN AN ORGANIZED HEALTH CARE EDUCATION/TRAINING PROGRAM

## 2024-10-17 PROCEDURE — 96365 THER/PROPH/DIAG IV INF INIT: CPT

## 2024-10-17 PROCEDURE — 71046 X-RAY EXAM CHEST 2 VIEWS: CPT

## 2024-10-17 PROCEDURE — 70498 CT ANGIOGRAPHY NECK: CPT

## 2024-10-17 PROCEDURE — 93005 ELECTROCARDIOGRAM TRACING: CPT

## 2024-10-17 PROCEDURE — 1100000001 HC PRIVATE ROOM DAILY

## 2024-10-17 PROCEDURE — 96375 TX/PRO/DX INJ NEW DRUG ADDON: CPT

## 2024-10-17 PROCEDURE — 99222 1ST HOSP IP/OBS MODERATE 55: CPT | Performed by: STUDENT IN AN ORGANIZED HEALTH CARE EDUCATION/TRAINING PROGRAM

## 2024-10-17 PROCEDURE — 94640 AIRWAY INHALATION TREATMENT: CPT

## 2024-10-17 PROCEDURE — 2500000002 HC RX 250 W HCPCS SELF ADMINISTERED DRUGS (ALT 637 FOR MEDICARE OP, ALT 636 FOR OP/ED): Performed by: STUDENT IN AN ORGANIZED HEALTH CARE EDUCATION/TRAINING PROGRAM

## 2024-10-17 PROCEDURE — 82805 BLOOD GASES W/O2 SATURATION: CPT | Performed by: STUDENT IN AN ORGANIZED HEALTH CARE EDUCATION/TRAINING PROGRAM

## 2024-10-17 PROCEDURE — 2550000001 HC RX 255 CONTRASTS: Performed by: STUDENT IN AN ORGANIZED HEALTH CARE EDUCATION/TRAINING PROGRAM

## 2024-10-17 PROCEDURE — 70498 CT ANGIOGRAPHY NECK: CPT | Performed by: STUDENT IN AN ORGANIZED HEALTH CARE EDUCATION/TRAINING PROGRAM

## 2024-10-17 PROCEDURE — 87186 SC STD MICRODIL/AGAR DIL: CPT | Mod: TRILAB,WESLAB | Performed by: STUDENT IN AN ORGANIZED HEALTH CARE EDUCATION/TRAINING PROGRAM

## 2024-10-17 PROCEDURE — 36415 COLL VENOUS BLD VENIPUNCTURE: CPT | Performed by: STUDENT IN AN ORGANIZED HEALTH CARE EDUCATION/TRAINING PROGRAM

## 2024-10-17 RX ORDER — DIPHENHYDRAMINE HYDROCHLORIDE 50 MG/ML
25 INJECTION INTRAMUSCULAR; INTRAVENOUS ONCE
Status: COMPLETED | OUTPATIENT
Start: 2024-10-17 | End: 2024-10-17

## 2024-10-17 RX ORDER — MECLIZINE HYDROCHLORIDE 25 MG/1
25 TABLET ORAL 3 TIMES DAILY PRN
Qty: 21 TABLET | Refills: 0 | Status: SHIPPED | OUTPATIENT
Start: 2024-10-17 | End: 2024-10-21 | Stop reason: HOSPADM

## 2024-10-17 RX ORDER — MORPHINE SULFATE 2 MG/ML
2 INJECTION, SOLUTION INTRAMUSCULAR; INTRAVENOUS ONCE
Status: COMPLETED | OUTPATIENT
Start: 2024-10-17 | End: 2024-10-17

## 2024-10-17 RX ORDER — CEFTRIAXONE 1 G/50ML
1 INJECTION, SOLUTION INTRAVENOUS ONCE
Status: COMPLETED | OUTPATIENT
Start: 2024-10-17 | End: 2024-10-17

## 2024-10-17 RX ORDER — PROCHLORPERAZINE EDISYLATE 5 MG/ML
5 INJECTION INTRAMUSCULAR; INTRAVENOUS ONCE
Status: COMPLETED | OUTPATIENT
Start: 2024-10-17 | End: 2024-10-17

## 2024-10-17 RX ORDER — IPRATROPIUM BROMIDE AND ALBUTEROL SULFATE 2.5; .5 MG/3ML; MG/3ML
3 SOLUTION RESPIRATORY (INHALATION)
Status: DISCONTINUED | OUTPATIENT
Start: 2024-10-17 | End: 2024-10-18

## 2024-10-17 RX ORDER — MECLIZINE HYDROCHLORIDE 25 MG/1
25 TABLET ORAL ONCE
Status: COMPLETED | OUTPATIENT
Start: 2024-10-17 | End: 2024-10-17

## 2024-10-17 ASSESSMENT — PAIN DESCRIPTION - DESCRIPTORS: DESCRIPTORS: STABBING

## 2024-10-17 ASSESSMENT — PAIN - FUNCTIONAL ASSESSMENT: PAIN_FUNCTIONAL_ASSESSMENT: 0-10

## 2024-10-17 ASSESSMENT — PAIN SCALES - GENERAL
PAINLEVEL_OUTOF10: 0 - NO PAIN
PAINLEVEL_OUTOF10: 9
PAINLEVEL_OUTOF10: 8
PAINLEVEL_OUTOF10: 0 - NO PAIN

## 2024-10-17 ASSESSMENT — PAIN DESCRIPTION - ONSET: ONSET: SUDDEN

## 2024-10-17 ASSESSMENT — PAIN DESCRIPTION - LOCATION: LOCATION: HEAD

## 2024-10-17 ASSESSMENT — PAIN DESCRIPTION - PAIN TYPE: TYPE: ACUTE PAIN

## 2024-10-17 ASSESSMENT — PAIN DESCRIPTION - FREQUENCY: FREQUENCY: CONSTANT/CONTINUOUS

## 2024-10-17 ASSESSMENT — PAIN DESCRIPTION - PROGRESSION: CLINICAL_PROGRESSION: NOT CHANGED

## 2024-10-17 NOTE — TELEPHONE ENCOUNTER
Pt daughter called stating pt is having symptoms of a stroke. Daughter states pt eyes are sideways when under pressure ,dizziness, and talking fast. She stated pt was off lyrica medication for 2 days and recently refilled medication and went back to taking it. I stated that the pt should be seen in the ED immediately. Pt daughter stated she and pt was at a pain management apt and will go to the ED  After there visit with them. I set pt up with a apt on 11/4 at 12:30a for a 30 min apt .     Pt lost her phone so if need contacted to contact her daughter at [phone]

## 2024-10-17 NOTE — ED PROVIDER NOTES
HPI   Chief Complaint   Patient presents with    Dizziness     Since yesterday I started having unsteady gait with confusion and stabbing headache which starts on the top of the neck and goes to the top of my head I feel nauseated and dizzy        Presents the ED with acute onset of dizziness and imbalance with ambulation starting yesterday.  Also notes increased confusion.  Family reports patient has a CVA with similar symptoms but now worse.  Unable to ambulate without significant cold and instability.  Also notes associated nausea.  Does states she has a headache that is in the top of her head it is the worst day of her life, does originate in the left side of her neck where she has a pinched nerve eraser top of her head.  Denies hitting her head.  Denies any AC/AP medication use.  Does experience infrequent diplopia and disconjugate gaze.  Also notes photophobia and phonophobia.  Reports a history of migraines.  States this seems different than her migraines given the severity of her neurosymptoms.  Does not appreciate any numbness/weakness/tingling of her extremities.  Does not endorse any significant dysarthria.  Does also note increased urinary frequency/urgency with mild dysuria but no appreciable hematuria.  Not endorsing any cardiac chest pain or pleuritic chest pain.  Notes mild dyspnea on exertion but denies requiring any supplemental oxygen support.  Has not appreciating feck symptoms to include fever/chills.  Denies any GI symptoms.    Does report increased stress with loss of her mother 2 weeks ago and significant travel.  Does not endorse any bilateral/unilateral leg swelling/edema.  Denies any other significant systemic symptoms or complaints. Denies any significant substance use.              Patient History   Past Medical History:   Diagnosis Date    Acute exacerbation of chronic obstructive pulmonary disease (Multi) 03/21/2024    Anxiety     Colon cancer screening 07/30/2024    Depression      FHx: atrial fibrillation     Mother/Patient denies any personal history.    History of anemia     HLD (hyperlipidemia)     Hypertension     Incontinence     Ingrown toenail of left foot     Chronic pain    Iron deficiency anemia     Nephrolithiasis     PSH Stone extraction    Nicotine dependence     OAB (overactive bladder)     Neuromuscular dysfunction of bladder    Opiate dependence     Pain in left hip 04/13/2022    Left hip pain    Right knee pain 06/02/2023    Rotator cuff injury, left, sequela     SOB (shortness of breath)     Spondylosis of cervical spine     Spondylosis of lumbar spine     Stroke (cerebrum)     Right thalmic/Residual vision issues.    Transient cerebral ischemic attack 10/14/2021    Tubal pregnancy (HHS-HCC)     PSH     Past Surgical History:   Procedure Laterality Date    CT ANGIO NECK  10/14/2021    CT NECK ANGIO W AND WO IV CONTRAST 10/14/2021 [facility] CLINICAL LEGACY    CT HEAD ANGIO W AND WO IV CONTRAST  10/14/2021    CT HEAD ANGIO W AND WO IV CONTRAST 10/14/2021 [facility] CLINICAL LEGACY    ECTOPIC PREGNANCY SURGERY      HIP ARTHROPLASTY  08/31/2020    MR HEAD ANGIO WO IV CONTRAST  06/02/2020    MR HEAD ANGIO WO IV CONTRAST 6/2/2020 [facility] CLINICAL LEGACY    MR HEAD ANGIO WO IV CONTRAST  08/10/2022    MR HEAD ANGIO WO IV CONTRAST 8/10/2022 [facility] CLINICAL LEGACY    MR HEAD ANGIO WO IV CONTRAST  06/24/2019    MR HEAD ANGIO WO IV CONTRAST [facility] INPATIENT LEGACY    MR NECK ANGIO WO IV CONTRAST  06/02/2020    MR NECK ANGIO WO IV CONTRAST 6/2/2020 [facility] CLINICAL LEGACY    MR NECK ANGIO WO IV CONTRAST  08/10/2022    MR NECK ANGIO WO IV CONTRAST 8/10/2022 [facility] CLINICAL LEGACY    URETEROLITHOTOMY       Family History   Problem Relation Name Age of Onset    Atrial fibrillation Mother      Diabetes Father      Hypertension Father       Social History     Tobacco Use    Smoking status: Every Day     Types: Cigarettes    Smokeless tobacco: Never   Substance Use Topics    Alcohol use: Never    Drug use: Never        Physical Exam   ED Triage Vitals [10/17/24 1904]   Temperature Heart Rate Respirations BP   37.4 °C (99.3 °F) (!) 110 18 102/73      Pulse Ox Temp Source Heart Rate Source Patient Position   (!) 93 % Oral Monitor Sitting      BP Location FiO2 (%)     Left arm --       Physical Exam  Vitals and nursing note reviewed.   Constitutional:       General: She is not in acute distress.     Appearance: Normal appearance. She is not ill-appearing.   HENT:      Head: Normocephalic and atraumatic.      Comments: Mild scalp tenderness  Eyes:      Extraocular Movements: Extraocular movements intact.      Pupils: Pupils are equal, round, and reactive to light.      Comments: Pupils 4 to 2 mm equal and symmetrically reactive   Cardiovascular:      Rate and Rhythm: Normal rate and regular rhythm.      Pulses: Normal pulses.           Radial pulses are 2+ on the right side and 2+ on the left side.      Heart sounds: Normal heart sounds. Heart sounds not distant. No murmur heard.     Comments: Equal and symmetrical distal BUE pulses  Pulmonary:      Effort: Pulmonary effort is normal. No respiratory distress.      Breath sounds: Normal breath sounds. No decreased air movement. No decreased breath sounds or rales.      Comments: Speech complete sentences, no conversational dyspnea, placed on supplemental 1-2L NC for borderline hypoxia and support  Musculoskeletal:      Cervical back: Normal range of motion and neck supple.      Right lower leg: No edema.      Left lower leg: No edema.   Skin:     General: Skin is warm and dry.      Coloration: Skin is not pale.   Neurological:      General: No focal deficit present.      Mental Status: She is alert and oriented to person, place, and time.      Cranial Nerves: Cranial nerves 2-12 are intact. No cranial nerve deficit.      Sensory: Sensation is intact. No sensory deficit.      Motor: Motor function is intact. No weakness or abnormal muscle tone.      Coordination: Romberg sign  positive. Coordination normal. Finger-Nose-Finger Test and Heel to Shin Test normal.      Comments: NIHSS 0, appropriate motor/strength x4 (prox/mid/distal joints), sensation appropriate for dermatomal pattern, deferred ambulatory assessment given positive Romberg sign           ED Course & [facility]   ED Course as of 10/18/24 1626   Thu Oct 17, 2024   1916 VS notable for tachycardia and borderline hypoxia on presentation in setting reported of headache and dizziness, remaining VSS [BC]   2042 I personally reviewed and interpreted the EKG @ 2042: NSR 87, normal axis/intervals and no appreciable ischemia, and prior EKG on 12/8/2023 reviewed without any appreciable specific/identifiable changes. [BC]   2135 CBC and Auto Differential(!)  Unremarkable and noncontributory to Patient condition/symptoms [BC]   2136 Comprehensive metabolic panel(!)  Unremarkable and noncontributory to Patient condition/symptoms [BC]   2136 Urinalysis with Reflex Culture and Microscopic(!)  Nitrites, significant leuk esterase and significant pyuria with 1+ bacteria and reported symptoms, concerning for UTI/cystitis and will treat [BC]   2136 POCT GLUCOSE  WNL, no concern for metabolic derangement [BC]   2136 Magnesium  WNL [BC]   2136 Blood Gas Venous(!)  No concern for metabolic/respiratory acidosis [BC]   2231 CT angio head and neck w and wo IV contrast  IMPRESSION:  1. Noncontrast CT images of the head do not demonstrate any evidence  of hemorrhage, CT apparent transcortical infarct, or other emergent  intracranial abnormality.  2. No large vessel occlusion is identified in the proximal  intracranial circulation. No significant stenosis is present in the  large arteries of the neck.  3. Geographic area of cystic encephalomalacia and gliosis in the  right frontal lobe likely represent sequela of prior infarct/injury,  similar in appearance to prior exam in August of 2022. Subtle  attenuation changes in the bilateral cerebral hemispheric  white  matter likely represent sequela of microvascular disease. [BC]   6926 On reassessment patient endorses moderate improvement in symptoms post ED interventions, still experiencing infrequent dizziness.  Intermittent mild ataxic gait but able to ambulate unassisted without any appreciable difficulty or significant instability, nursing noted patient immediately becomes hypoxic requiring supplemental O2 while at rest. [BC]   Fri Oct 18, 2024   0000 XR chest 2 views  IMPRESSION:  1.  No evidence of acute cardiopulmonary process.    I have personally reviewed and interpreted the images, no evidence of acute cardiopulmonary pathologies, no clinical evidence of pulmonary opacities or pulmonary vascular congestion, cardiac enlargement, agree with radiology final read [BC]   0300 Sars-CoV-2 PCR(!)  COVID-positive in setting of neurosymptoms along with mild hypoxia [BC]      ED Course User Index  [BC] Les Montoya MD         Diagnoses as of 10/18/24 1626   Migraine without status migrainosus, not intractable, unspecified migraine type   Dizziness   Tachycardia   Imbalance due to old stroke   Hypoxia   COPD exacerbation (Multi)   COVID                 No data recorded     Shahla Coma Scale Score: 15 (10/18/24 0233 : Adwoa Smith RN)       NIH Stroke Scale: 0 (10/17/24 1949 : Les Montoya MD)                   Medical Decision Making  Patient presented to the ED for acute onset dizziness for the last 12 to 24 hours with associated headache, mild shortness of breath and urinary symptoms with concerning PMHx of CVA, TN, HLD, COPD, anxiety, depression, cervical radiculopathy.  I personally reviewed and interpreted yes, labs, images, and EKG which are as stated above in the ED course.    Assessment/evaluation concerning for vertigo/BPPV, potential mild dehydration and mild stroke recrudescence, complicated by COVID PNA and found to be intermittently hypoxic at rest requiring supplemental O2, UTI/cystitis.   No concerning history, clinical evidence/work-up, or exam findings for the considered differentials of malignant cardiac arrhythmia, significant electrolyte/metabolic derangement, respiratory/metabolic acidosis, anemia, focal/multifocal lobar PNA, ischemic CVA (posterior and density, acute HF/fluid overload, focal/multifocal lobar PNA.  These conditions have been thoroughly evaluated and determined to be sufficiently unlikely to be the etiology of patient's presenting symptoms.    Scores NIHSS 0    After receiving an appropriate exam, clinical work-up, and necessary interventions/treatment, [facility] at this time due to concern for acute decompensation and further interrogation/management of symptoms.  Patient was encouraged to ask any questions or for clarification of today's ED encounter.  Patient is agreeable to plan of care.    Per Chart Review: Office visit for pain management on 10/17/2024 for reported back pain, but summary notes chronic problem along with neck pain which is also noted is chronic, VSS, exam grossly unremarkable/noncontributory, plan for continued physical therapy and medical management of symptoms, no report of gait instability or dizziness.      Parts of this chart have been completed using voice-to-text recognition software. Please excuse any errors of transcription that were missed for editing/correcting.    Problems Addressed:  COVID: undiagnosed new problem with uncertain prognosis  Dizziness: acute illness or injury  Hypoxia: undiagnosed new problem with uncertain prognosis  Migraine without status migrainosus, not intractable, unspecified migraine type: self-limited or minor problem    Amount and/or Complexity of Data Reviewed  External Data Reviewed: notes.     Details: See [facility]  Labs: ordered. Decision-making details documented in ED Course.  Radiology: ordered and independent interpretation performed. Decision-making details  documented in ED Course.  ECG/medicine tests: ordered and independent interpretation performed. Decision-making details documented in ED Course.        Procedure  Procedures     Les Montoya MD  10/18/24 4276

## 2024-10-17 NOTE — LETTER
October 21, 2024     Patient: Mona Sanders   YOB: 1965   Date of Visit: 10/17/2024       To Whom It May Concern:    Mona Sanders was seen in hospital on 10/17/2024 thru 10/21/2024. Please excuse Mona for her absence from work on these days and she may return to work without restrictions.    If you have any questions or concerns, please don't hesitate to call.         Sincerely,           Jesús Dexter      CC: No Recipients

## 2024-10-18 PROBLEM — J44.1 CHRONIC OBSTRUCTIVE PULMONARY DISEASE WITH ACUTE EXACERBATION (MULTI): Status: ACTIVE | Noted: 2024-03-21

## 2024-10-18 PROBLEM — R09.02 HYPOXIA: Status: ACTIVE | Noted: 2024-10-18

## 2024-10-18 PROBLEM — N30.00 ACUTE CYSTITIS WITHOUT HEMATURIA: Status: ACTIVE | Noted: 2024-10-18

## 2024-10-18 LAB
ANION GAP SERPL CALCULATED.3IONS-SCNC: 14 MMOL/L (ref 10–20)
ATRIAL RATE: 87 BPM
BUN SERPL-MCNC: 6 MG/DL (ref 6–23)
CALCIUM SERPL-MCNC: 8.4 MG/DL (ref 8.6–10.3)
CHLORIDE SERPL-SCNC: 104 MMOL/L (ref 98–107)
CO2 SERPL-SCNC: 24 MMOL/L (ref 21–32)
CREAT SERPL-MCNC: 0.75 MG/DL (ref 0.5–1.05)
EGFRCR SERPLBLD CKD-EPI 2021: >90 ML/MIN/1.73M*2
ERYTHROCYTE [DISTWIDTH] IN BLOOD BY AUTOMATED COUNT: 15.9 % (ref 11.5–14.5)
FLUAV RNA RESP QL NAA+PROBE: NOT DETECTED
FLUBV RNA RESP QL NAA+PROBE: NOT DETECTED
GLUCOSE BLD MANUAL STRIP-MCNC: 121 MG/DL (ref 74–99)
GLUCOSE SERPL-MCNC: 92 MG/DL (ref 74–99)
HCT VFR BLD AUTO: 38.4 % (ref 36–46)
HGB BLD-MCNC: 12.6 G/DL (ref 12–16)
MCH RBC QN AUTO: 26.9 PG (ref 26–34)
MCHC RBC AUTO-ENTMCNC: 32.8 G/DL (ref 32–36)
MCV RBC AUTO: 82 FL (ref 80–100)
NRBC BLD-RTO: 0 /100 WBCS (ref 0–0)
P AXIS: 68 DEGREES
P OFFSET: 188 MS
P ONSET: 131 MS
PLATELET # BLD AUTO: 170 X10*3/UL (ref 150–450)
POTASSIUM SERPL-SCNC: 3.2 MMOL/L (ref 3.5–5.3)
PR INTERVAL: 190 MS
PROCALCITONIN SERPL-MCNC: 0.02 NG/ML
Q ONSET: 226 MS
QRS COUNT: 15 BEATS
QRS DURATION: 92 MS
QT INTERVAL: 356 MS
QTC CALCULATION(BAZETT): 428 MS
QTC FREDERICIA: 402 MS
R AXIS: 68 DEGREES
RBC # BLD AUTO: 4.68 X10*6/UL (ref 4–5.2)
SARS-COV-2 RNA RESP QL NAA+PROBE: DETECTED
SODIUM SERPL-SCNC: 139 MMOL/L (ref 136–145)
T AXIS: 69 DEGREES
T OFFSET: 404 MS
VENTRICULAR RATE: 87 BPM
WBC # BLD AUTO: 5.2 X10*3/UL (ref 4.4–11.3)

## 2024-10-18 PROCEDURE — 9420000001 HC RT PATIENT EDUCATION 5 MIN

## 2024-10-18 PROCEDURE — 2500000004 HC RX 250 GENERAL PHARMACY W/ HCPCS (ALT 636 FOR OP/ED): Performed by: STUDENT IN AN ORGANIZED HEALTH CARE EDUCATION/TRAINING PROGRAM

## 2024-10-18 PROCEDURE — 1100000001 HC PRIVATE ROOM DAILY

## 2024-10-18 PROCEDURE — 2500000005 HC RX 250 GENERAL PHARMACY W/O HCPCS: Performed by: STUDENT IN AN ORGANIZED HEALTH CARE EDUCATION/TRAINING PROGRAM

## 2024-10-18 PROCEDURE — 2500000002 HC RX 250 W HCPCS SELF ADMINISTERED DRUGS (ALT 637 FOR MEDICARE OP, ALT 636 FOR OP/ED): Performed by: STUDENT IN AN ORGANIZED HEALTH CARE EDUCATION/TRAINING PROGRAM

## 2024-10-18 PROCEDURE — 2500000002 HC RX 250 W HCPCS SELF ADMINISTERED DRUGS (ALT 637 FOR MEDICARE OP, ALT 636 FOR OP/ED): Performed by: NURSE PRACTITIONER

## 2024-10-18 PROCEDURE — 84145 PROCALCITONIN (PCT): CPT | Mod: TRILAB,WESLAB | Performed by: STUDENT IN AN ORGANIZED HEALTH CARE EDUCATION/TRAINING PROGRAM

## 2024-10-18 PROCEDURE — 94640 AIRWAY INHALATION TREATMENT: CPT

## 2024-10-18 PROCEDURE — 87635 SARS-COV-2 COVID-19 AMP PRB: CPT | Performed by: STUDENT IN AN ORGANIZED HEALTH CARE EDUCATION/TRAINING PROGRAM

## 2024-10-18 PROCEDURE — 80048 BASIC METABOLIC PNL TOTAL CA: CPT | Performed by: STUDENT IN AN ORGANIZED HEALTH CARE EDUCATION/TRAINING PROGRAM

## 2024-10-18 PROCEDURE — 2500000004 HC RX 250 GENERAL PHARMACY W/ HCPCS (ALT 636 FOR OP/ED): Performed by: NURSE PRACTITIONER

## 2024-10-18 PROCEDURE — 94664 DEMO&/EVAL PT USE INHALER: CPT

## 2024-10-18 PROCEDURE — 3E0DX3Z INTRODUCTION OF ANTI-INFLAMMATORY INTO MOUTH AND PHARYNX, EXTERNAL APPROACH: ICD-10-PCS | Performed by: STUDENT IN AN ORGANIZED HEALTH CARE EDUCATION/TRAINING PROGRAM

## 2024-10-18 PROCEDURE — 94667 MNPJ CHEST WALL 1ST: CPT

## 2024-10-18 PROCEDURE — 99254 IP/OBS CNSLTJ NEW/EST MOD 60: CPT | Performed by: NURSE PRACTITIONER

## 2024-10-18 PROCEDURE — 94762 N-INVAS EAR/PLS OXIMTRY CONT: CPT

## 2024-10-18 PROCEDURE — 99232 SBSQ HOSP IP/OBS MODERATE 35: CPT | Performed by: NURSE PRACTITIONER

## 2024-10-18 PROCEDURE — 85027 COMPLETE CBC AUTOMATED: CPT | Performed by: STUDENT IN AN ORGANIZED HEALTH CARE EDUCATION/TRAINING PROGRAM

## 2024-10-18 PROCEDURE — S4991 NICOTINE PATCH NONLEGEND: HCPCS | Performed by: STUDENT IN AN ORGANIZED HEALTH CARE EDUCATION/TRAINING PROGRAM

## 2024-10-18 PROCEDURE — 2500000001 HC RX 250 WO HCPCS SELF ADMINISTERED DRUGS (ALT 637 FOR MEDICARE OP): Performed by: STUDENT IN AN ORGANIZED HEALTH CARE EDUCATION/TRAINING PROGRAM

## 2024-10-18 RX ORDER — KETOROLAC TROMETHAMINE 30 MG/ML
30 INJECTION, SOLUTION INTRAMUSCULAR; INTRAVENOUS ONCE
Status: COMPLETED | OUTPATIENT
Start: 2024-10-18 | End: 2024-10-18

## 2024-10-18 RX ORDER — ONDANSETRON 4 MG/1
4 TABLET, ORALLY DISINTEGRATING ORAL EVERY 8 HOURS PRN
Status: DISCONTINUED | OUTPATIENT
Start: 2024-10-18 | End: 2024-10-21 | Stop reason: HOSPADM

## 2024-10-18 RX ORDER — IBUPROFEN 200 MG
1 TABLET ORAL DAILY
Status: DISCONTINUED | OUTPATIENT
Start: 2024-11-29 | End: 2024-10-21 | Stop reason: HOSPADM

## 2024-10-18 RX ORDER — ATORVASTATIN CALCIUM 40 MG/1
40 TABLET, FILM COATED ORAL NIGHTLY
Status: DISCONTINUED | OUTPATIENT
Start: 2024-10-18 | End: 2024-10-21 | Stop reason: HOSPADM

## 2024-10-18 RX ORDER — ACETAMINOPHEN 325 MG/1
650 TABLET ORAL EVERY 4 HOURS PRN
Status: DISCONTINUED | OUTPATIENT
Start: 2024-10-18 | End: 2024-10-21 | Stop reason: HOSPADM

## 2024-10-18 RX ORDER — PREDNISONE 20 MG/1
40 TABLET ORAL
Status: DISCONTINUED | OUTPATIENT
Start: 2024-10-18 | End: 2024-10-18

## 2024-10-18 RX ORDER — IBUPROFEN 200 MG
1 TABLET ORAL DAILY
Status: DISCONTINUED | OUTPATIENT
Start: 2024-10-18 | End: 2024-10-21 | Stop reason: HOSPADM

## 2024-10-18 RX ORDER — ENOXAPARIN SODIUM 100 MG/ML
40 INJECTION SUBCUTANEOUS DAILY
Status: DISCONTINUED | OUTPATIENT
Start: 2024-10-18 | End: 2024-10-21 | Stop reason: HOSPADM

## 2024-10-18 RX ORDER — IBUPROFEN 200 MG
1 TABLET ORAL ONCE
Status: COMPLETED | OUTPATIENT
Start: 2024-10-18 | End: 2024-10-19

## 2024-10-18 RX ORDER — HYDROXYZINE HYDROCHLORIDE 25 MG/1
50 TABLET, FILM COATED ORAL 2 TIMES DAILY
Status: DISCONTINUED | OUTPATIENT
Start: 2024-10-18 | End: 2024-10-21 | Stop reason: HOSPADM

## 2024-10-18 RX ORDER — POTASSIUM CHLORIDE 20 MEQ/1
40 TABLET, EXTENDED RELEASE ORAL ONCE
Status: COMPLETED | OUTPATIENT
Start: 2024-10-18 | End: 2024-10-18

## 2024-10-18 RX ORDER — DULOXETIN HYDROCHLORIDE 30 MG/1
30 CAPSULE, DELAYED RELEASE ORAL DAILY
Status: DISCONTINUED | OUTPATIENT
Start: 2024-10-18 | End: 2024-10-21 | Stop reason: HOSPADM

## 2024-10-18 RX ORDER — CEFTRIAXONE 1 G/50ML
1 INJECTION, SOLUTION INTRAVENOUS EVERY 24 HOURS
Status: DISCONTINUED | OUTPATIENT
Start: 2024-10-18 | End: 2024-10-21 | Stop reason: HOSPADM

## 2024-10-18 RX ORDER — POLYETHYLENE GLYCOL 3350 17 G/17G
17 POWDER, FOR SOLUTION ORAL DAILY
Status: DISCONTINUED | OUTPATIENT
Start: 2024-10-18 | End: 2024-10-21 | Stop reason: HOSPADM

## 2024-10-18 RX ORDER — ONDANSETRON HYDROCHLORIDE 2 MG/ML
4 INJECTION, SOLUTION INTRAVENOUS EVERY 8 HOURS PRN
Status: DISCONTINUED | OUTPATIENT
Start: 2024-10-18 | End: 2024-10-21 | Stop reason: HOSPADM

## 2024-10-18 RX ORDER — IPRATROPIUM BROMIDE AND ALBUTEROL SULFATE 2.5; .5 MG/3ML; MG/3ML
3 SOLUTION RESPIRATORY (INHALATION)
Status: DISCONTINUED | OUTPATIENT
Start: 2024-10-18 | End: 2024-10-21 | Stop reason: HOSPADM

## 2024-10-18 RX ORDER — NICOTINE 7MG/24HR
1 PATCH, TRANSDERMAL 24 HOURS TRANSDERMAL DAILY
Status: DISCONTINUED | OUTPATIENT
Start: 2024-12-13 | End: 2024-10-21 | Stop reason: HOSPADM

## 2024-10-18 RX ORDER — ACETAMINOPHEN 650 MG/1
650 SUPPOSITORY RECTAL EVERY 4 HOURS PRN
Status: DISCONTINUED | OUTPATIENT
Start: 2024-10-18 | End: 2024-10-21 | Stop reason: HOSPADM

## 2024-10-18 RX ORDER — PREGABALIN 150 MG/1
150 CAPSULE ORAL 3 TIMES DAILY
Status: DISCONTINUED | OUTPATIENT
Start: 2024-10-18 | End: 2024-10-19

## 2024-10-18 RX ORDER — DEXAMETHASONE 6 MG/1
6 TABLET ORAL
Status: DISCONTINUED | OUTPATIENT
Start: 2024-10-18 | End: 2024-10-21 | Stop reason: HOSPADM

## 2024-10-18 RX ORDER — ASPIRIN 81 MG/1
81 TABLET ORAL DAILY
Status: DISCONTINUED | OUTPATIENT
Start: 2024-10-18 | End: 2024-10-21 | Stop reason: HOSPADM

## 2024-10-18 RX ORDER — ACETAMINOPHEN 160 MG/5ML
650 SOLUTION ORAL EVERY 4 HOURS PRN
Status: DISCONTINUED | OUTPATIENT
Start: 2024-10-18 | End: 2024-10-21 | Stop reason: HOSPADM

## 2024-10-18 SDOH — ECONOMIC STABILITY: INCOME INSECURITY: IN THE PAST 12 MONTHS HAS THE ELECTRIC, GAS, OIL, OR WATER COMPANY THREATENED TO SHUT OFF SERVICES IN YOUR HOME?: NO

## 2024-10-18 SDOH — SOCIAL STABILITY: SOCIAL INSECURITY
WITHIN THE LAST YEAR, HAVE YOU BEEN KICKED, HIT, SLAPPED, OR OTHERWISE PHYSICALLY HURT BY YOUR PARTNER OR EX-PARTNER?: NO

## 2024-10-18 SDOH — SOCIAL STABILITY: SOCIAL INSECURITY: HAVE YOU HAD THOUGHTS OF HARMING ANYONE ELSE?: NO

## 2024-10-18 SDOH — SOCIAL STABILITY: SOCIAL INSECURITY: WERE YOU ABLE TO COMPLETE ALL THE BEHAVIORAL HEALTH SCREENINGS?: YES

## 2024-10-18 SDOH — SOCIAL STABILITY: SOCIAL INSECURITY
WITHIN THE LAST YEAR, HAVE YOU BEEN RAPED OR FORCED TO HAVE ANY KIND OF SEXUAL ACTIVITY BY YOUR PARTNER OR EX-PARTNER?: NO

## 2024-10-18 SDOH — ECONOMIC STABILITY: FOOD INSECURITY: WITHIN THE PAST 12 MONTHS, YOU WORRIED THAT YOUR FOOD WOULD RUN OUT BEFORE YOU GOT THE MONEY TO BUY MORE.: NEVER TRUE

## 2024-10-18 SDOH — SOCIAL STABILITY: SOCIAL INSECURITY: WITHIN THE LAST YEAR, HAVE YOU BEEN AFRAID OF YOUR PARTNER OR EX-PARTNER?: NO

## 2024-10-18 SDOH — ECONOMIC STABILITY: FOOD INSECURITY: WITHIN THE PAST 12 MONTHS, THE FOOD YOU BOUGHT JUST DIDN'T LAST AND YOU DIDN'T HAVE MONEY TO GET MORE.: NEVER TRUE

## 2024-10-18 SDOH — SOCIAL STABILITY: SOCIAL INSECURITY: ARE THERE ANY APPARENT SIGNS OF INJURIES/BEHAVIORS THAT COULD BE RELATED TO ABUSE/NEGLECT?: NO

## 2024-10-18 SDOH — SOCIAL STABILITY: SOCIAL INSECURITY: WITHIN THE LAST YEAR, HAVE YOU BEEN HUMILIATED OR EMOTIONALLY ABUSED IN OTHER WAYS BY YOUR PARTNER OR EX-PARTNER?: NO

## 2024-10-18 SDOH — SOCIAL STABILITY: SOCIAL INSECURITY: HAS ANYONE EVER THREATENED TO HURT YOUR FAMILY OR YOUR PETS?: NO

## 2024-10-18 SDOH — SOCIAL STABILITY: SOCIAL INSECURITY: HAVE YOU HAD ANY THOUGHTS OF HARMING ANYONE ELSE?: NO

## 2024-10-18 SDOH — SOCIAL STABILITY: SOCIAL INSECURITY: DO YOU FEEL ANYONE HAS EXPLOITED OR TAKEN ADVANTAGE OF YOU FINANCIALLY OR OF YOUR PERSONAL PROPERTY?: NO

## 2024-10-18 SDOH — SOCIAL STABILITY: SOCIAL INSECURITY: ARE YOU OR HAVE YOU BEEN THREATENED OR ABUSED PHYSICALLY, EMOTIONALLY, OR SEXUALLY BY ANYONE?: NO

## 2024-10-18 SDOH — SOCIAL STABILITY: SOCIAL INSECURITY: DOES ANYONE TRY TO KEEP YOU FROM HAVING/CONTACTING OTHER FRIENDS OR DOING THINGS OUTSIDE YOUR HOME?: NO

## 2024-10-18 SDOH — SOCIAL STABILITY: SOCIAL INSECURITY: ABUSE: ADULT

## 2024-10-18 SDOH — SOCIAL STABILITY: SOCIAL INSECURITY: DO YOU FEEL UNSAFE GOING BACK TO THE PLACE WHERE YOU ARE LIVING?: NO

## 2024-10-18 ASSESSMENT — PATIENT HEALTH QUESTIONNAIRE - PHQ9
1. LITTLE INTEREST OR PLEASURE IN DOING THINGS: SEVERAL DAYS
2. FEELING DOWN, DEPRESSED OR HOPELESS: SEVERAL DAYS
SUM OF ALL RESPONSES TO PHQ9 QUESTIONS 1 & 2: 2

## 2024-10-18 ASSESSMENT — COGNITIVE AND FUNCTIONAL STATUS - GENERAL
PATIENT BASELINE BEDBOUND: NO
MOBILITY SCORE: 24
DAILY ACTIVITIY SCORE: 24

## 2024-10-18 ASSESSMENT — ENCOUNTER SYMPTOMS
EYES NEGATIVE: 1
CARDIOVASCULAR NEGATIVE: 1
FATIGUE: 1
DIZZINESS: 1
SHORTNESS OF BREATH: 1
MUSCULOSKELETAL NEGATIVE: 1
HEMATOLOGIC/LYMPHATIC NEGATIVE: 1
ALLERGIC/IMMUNOLOGIC NEGATIVE: 1
GASTROINTESTINAL NEGATIVE: 1
ENDOCRINE NEGATIVE: 1
NAUSEA: 1
PSYCHIATRIC NEGATIVE: 1
HEADACHES: 1
FEVER: 0
ARTHRALGIAS: 1

## 2024-10-18 ASSESSMENT — ACTIVITIES OF DAILY LIVING (ADL)
FEEDING YOURSELF: INDEPENDENT
GROOMING: INDEPENDENT
PATIENT'S MEMORY ADEQUATE TO SAFELY COMPLETE DAILY ACTIVITIES?: YES
TOILETING: INDEPENDENT
DRESSING YOURSELF: INDEPENDENT
BATHING: INDEPENDENT
HEARING - LEFT EAR: FUNCTIONAL
ADEQUATE_TO_COMPLETE_ADL: YES
LACK_OF_TRANSPORTATION: NO
JUDGMENT_ADEQUATE_SAFELY_COMPLETE_DAILY_ACTIVITIES: YES
WALKS IN HOME: INDEPENDENT
HEARING - RIGHT EAR: FUNCTIONAL

## 2024-10-18 ASSESSMENT — PAIN DESCRIPTION - DESCRIPTORS
DESCRIPTORS: STABBING
DESCRIPTORS: STABBING

## 2024-10-18 ASSESSMENT — LIFESTYLE VARIABLES
HOW OFTEN DO YOU HAVE A DRINK CONTAINING ALCOHOL: NEVER
HOW OFTEN DO YOU HAVE 6 OR MORE DRINKS ON ONE OCCASION: NEVER
AUDIT-C TOTAL SCORE: 0
HOW MANY STANDARD DRINKS CONTAINING ALCOHOL DO YOU HAVE ON A TYPICAL DAY: PATIENT DOES NOT DRINK
SKIP TO QUESTIONS 9-10: 1
AUDIT-C TOTAL SCORE: 0

## 2024-10-18 ASSESSMENT — PAIN - FUNCTIONAL ASSESSMENT
PAIN_FUNCTIONAL_ASSESSMENT: 0-10
PAIN_FUNCTIONAL_ASSESSMENT: UNABLE TO SELF-REPORT
PAIN_FUNCTIONAL_ASSESSMENT: 0-10

## 2024-10-18 ASSESSMENT — PAIN DESCRIPTION - LOCATION
LOCATION: HEAD
LOCATION: HEAD

## 2024-10-18 ASSESSMENT — PAIN SCALES - GENERAL
PAINLEVEL_OUTOF10: 0 - NO PAIN
PAINLEVEL_OUTOF10: 8
PAINLEVEL_OUTOF10: 7
PAINLEVEL_OUTOF10: 8

## 2024-10-18 ASSESSMENT — PAIN DESCRIPTION - ORIENTATION: ORIENTATION: MID

## 2024-10-18 NOTE — DISCHARGE INSTRUCTIONS
Thank you for allowing myself and the team to take care of you during your emergency situation and addressing your health concerns.    You were evaluated for dizziness/lightheadedness.    During your visit in the emergency department you were evaluated for your presenting symptoms.  Based on the extensive workup you received,  all efforts were made to identify the source of your symptoms and identify any life-threatening conditions.  These life-threatening conditions that were attempted to be identified and medically managed/treated include but not limited to abnormal rhythm of your heart, narrowing of vessels in your neck entering into your skull/brain, blood electrolyte imbalance, metabolic (blood sugar) imbalance, endocrine (hormone) imbalance, or dehydration.  Additionally, you may be experiencing symptoms that are non-life-threatening but are uncomfortable and disruptive to your everyday life.  All efforts were made to manage your symptoms while in the emergency department along with appropriate at home therapy for symptomatic management during your recovery. Please be aware that not all of the conditions explained/discussed in these discharge instructions are applicable to your condition but encompass many of the conditions that were evaluated for during your ED encounter.      During your evaluation and assessment, all measures were taken to evaluate you and address your health concerns to identify dangerous and life threatening health conditions. It is not possible to identify all health conditions or pathologies and eliminate any chance of unfavorable outcomes while in the Emergency Department. My team encourages you to be vigilant with your health and follow-up with the appropriate providers outlined in your discharge paperwork. Please return to the Emergency Department if you feel that your health has not improved or experiencing worsening symptoms.    Special instructions take the medication as  prescribed.  Please make follow-up with your primary care physician to further manage symptoms address your concerns.  A physical therapy (vestibular therapy) referral was placed to further manage her symptoms.    Incidental findings:  None

## 2024-10-18 NOTE — SIGNIFICANT EVENT
Since, she was hypoxic I did the covid testing, which was positive. I switch prednisone 40 to 6 mg dexamethasone. Already on oxygen. Pulm consult pending. Started isolation.

## 2024-10-18 NOTE — H&P
History Of Present Illness  Mona Sanders is a 59 y.o. female presenting with past medical hx of Migraine, COPD (not on home oxygen), anxiety, HLD, Smoking came to ED with complains of dizziness and imbalance started yesterday. Family reports patient has a CVA with similar symptoms but now worse. Unable to ambulate without significant cold and instability. Also notes associated nausea. Does states she has a headache that is in the top of her head it is the worst day of her life, does originate in the left side of her neck where she has a pinched nerve eraser top of her head. Denies hitting her head. NIH is negative during my evaluation. She reported sob during my evaluation starting yesterday. No fever or sputum production. Denied cough.    ED workup negative for any acute neurological disorder. UA was concerning for UTI. During ambulation and resting patient was constantly requiring 2 L of oxygen which is different than baseline. Patient will be admitted under hospital medicine for COPD exacerbation.      Past Medical History  She has a past medical history of Acute exacerbation of chronic obstructive pulmonary disease (Multi) (03/21/2024), Anxiety, Colon cancer screening (07/30/2024), Depression, FHx: atrial fibrillation, History of anemia, HLD (hyperlipidemia), Hypertension, Incontinence, Ingrown toenail of left foot, Iron deficiency anemia, Nephrolithiasis, Nicotine dependence, OAB (overactive bladder), Opiate dependence, Pain in left hip (04/13/2022), Right knee pain (06/02/2023), Rotator cuff injury, left, sequela, SOB (shortness of breath), Spondylosis of cervical spine, Spondylosis of lumbar spine, Stroke (cerebrum), Transient cerebral ischemic attack (10/14/2021), and Tubal pregnancy ([facility]).    Surgical History  She has a past surgical history that includes Hip Arthroplasty (08/31/2020); MR angio head wo IV contrast (06/02/2020); MR angio neck wo IV contrast (06/02/2020); CT angio neck (10/14/2021);  CT angio head w and wo IV contrast (10/14/2021); MR angio head wo IV contrast (08/10/2022); MR angio neck wo IV contrast (08/10/2022); MR angio head wo IV contrast (06/24/2019); Ureterolithotomy; and Ectopic pregnancy surgery.     Social History  She reports that she has been smoking cigarettes. She has never used smokeless tobacco. She reports that she does not drink alcohol and does not use drugs.    Family History  Family History   Problem Relation Name Age of Onset    Atrial fibrillation Mother      Diabetes Father      Hypertension Father          Allergies  Tramadol, Bupropion, and Wellbutrin [bupropion hcl]    Review of Systems   Constitutional:  Positive for fatigue. Negative for fever.   HENT: Negative.     Respiratory:  Positive for shortness of breath.    Cardiovascular: Negative.    Gastrointestinal: Negative.    Endocrine: Negative.    Genitourinary: Negative.    Musculoskeletal:  Positive for arthralgias.        Physical Exam  Constitutional:       Appearance: She is ill-appearing.   HENT:      Head: Normocephalic and atraumatic.      Nose: Nose normal.      Mouth/Throat:      Mouth: Mucous membranes are moist.      Pharynx: Oropharynx is clear.   Eyes:      Extraocular Movements: Extraocular movements intact.      Conjunctiva/sclera: Conjunctivae normal.      Pupils: Pupils are equal, round, and reactive to light.   Cardiovascular:      Rate and Rhythm: Normal rate and regular rhythm.      Pulses: Normal pulses.      Heart sounds: Normal heart sounds.   Pulmonary:      Effort: Pulmonary effort is normal.      Breath sounds: Wheezing present.      Comments: On 2 L NC b/l wheezing  Abdominal:      General: Abdomen is flat. Bowel sounds are normal.   Musculoskeletal:         General: Normal range of motion.      Cervical back: Normal range of motion and neck supple.      Right lower leg: No edema.      Left lower leg: No edema.   Skin:     General: Skin is warm.      Capillary Refill: Capillary refill  takes less than 2 seconds.   Neurological:      General: No focal deficit present.      Mental Status: She is alert and oriented to person, place, and time.      Cranial Nerves: No cranial nerve deficit.      Sensory: No sensory deficit.      Motor: No weakness.      Coordination: Coordination normal.      Deep Tendon Reflexes: Reflexes normal.   Psychiatric:         Mood and Affect: Mood normal.         Behavior: Behavior normal.         Thought Content: Thought content normal.         Judgment: Judgment normal.          Last Recorded Vitals  BP 97/81   Pulse 95   Temp 36.7 °C (98.1 °F)   Resp 18   Wt 77.6 kg (171 lb)   SpO2 (!) 93%     Relevant Results             Assessment/Plan   Assessment & Plan  Migraine without status migrainosus, not intractable, unspecified migraine type    Chronic obstructive pulmonary disease with acute exacerbation (Multi)    Acute cystitis without hematuria    Hypoxia      # COPD exacerbation  # Hypoxia  - Requiring o2 at rest and ambulation, see vitals above  - Wheeze on exam  - Start duo neb q6   - Hold home inhalers  - Send covid and flu  - Pulm consult  - Prednisone 40 mg once daily for 5 days  - Abx not indicated at this time although on ceftriaxone for UTI  - Wean o2 as tolerated    # Dizziness  # Hx of stroke  - Could be multifactorial due to hypoxia and UTI  - CTA negative  - Treating both for now  - Orthostatic vitals tomorrow  - Continue home medication aspirin    # UTI  - UA positive with dizziness  - Ceftriaxone for now  - [place] after culture    #  HLD  - Continue statin    # Anxiety   - Continue home medications    DVT: Lovenox  Disposition: Pending improvement, will likely discharge in 24 to 48 hours.       Ryley Delvalle MD

## 2024-10-18 NOTE — PROGRESS NOTES
Mona Sanders is a 59 y.o. female on day 1 of admission presenting with Chronic obstructive pulmonary disease with acute exacerbation (Multi).      Subjective   Patient seen and examined. Resting in bed. States she still has significant headache, mostly located on the top of her head. She has some mild SOB. On room air on my exam, checked and O2 sats 92%. She denies any other neurologic symptoms.        Objective     Last Recorded Vitals  BP 92/66 (BP Location: Left arm, Patient Position: Lying)   Pulse 88   Temp 36.5 °C (97.7 °F) (Temporal)   Resp 20   Wt 77.6 kg (171 lb)   SpO2 92%   Intake/Output last 3 Shifts:    Intake/Output Summary (Last 24 hours) at 10/18/2024 1110  Last data filed at 10/18/2024 1022  Gross per 24 hour   Intake 1100 ml   Output 450 ml   Net 650 ml       Admission Weight  Weight: 77.6 kg (171 lb 1.2 oz) (10/17/24 1904)    Daily Weight  10/17/24 : 77.6 kg (171 lb)    Image Results    No new imaging to review.     Physical Exam    General: Alert and oriented x3, pleasant.   Cardiac: Regular rate and rhythm, S1/S2 , no murmur.   Pulmonary: Diminished with some diffuse exp wheeze on room air.   Abdomen: Soft, round, nontender. BS +x4.   Extremities: No edema. Equal strength in all extremities.   Skin: No rashes or lesions.      Relevant Results    Scheduled medications  aspirin, 81 mg, oral, Daily  atorvastatin, 40 mg, oral, Nightly  cefTRIAXone, 1 g, intravenous, q24h  dexAMETHasone, 6 mg, oral, Daily with breakfast  DULoxetine, 30 mg, oral, Daily  enoxaparin, 40 mg, subcutaneous, Daily  hydrOXYzine HCL, 50 mg, oral, BID  ipratropium-albuteroL, 3 mL, nebulization, TID  ketorolac, 30 mg, intravenous, Once  nicotine, 1 patch, transdermal, Daily   Followed by  [START ON 11/29/2024] nicotine, 1 patch, transdermal, Daily   Followed by  [START ON 12/13/2024] nicotine, 1 patch, transdermal, Daily  nicotine, 1 patch, transdermal, Once  oxygen, , inhalation, q8h  polyethylene glycol, 17 g,  oral, Daily  pregabalin, 150 mg, oral, TID      Continuous medications     PRN medications  PRN medications: acetaminophen **OR** acetaminophen **OR** acetaminophen, ondansetron ODT **OR** ondansetron     Results for orders placed or performed during the hospital encounter of 10/17/24 (from the past 24 hours)   POCT GLUCOSE   Result Value Ref Range    POCT Glucose 121 (H) 74 - 99 mg/dL   Magnesium   Result Value Ref Range    Magnesium 1.70 1.60 - 2.40 mg/dL   CBC and Auto Differential   Result Value Ref Range    WBC 6.7 4.4 - 11.3 x10*3/uL    nRBC 0.0 0.0 - 0.0 /100 WBCs    RBC 5.18 4.00 - 5.20 x10*6/uL    Hemoglobin 13.8 12.0 - 16.0 g/dL    Hematocrit 42.5 36.0 - 46.0 %    MCV 82 80 - 100 fL    MCH 26.6 26.0 - 34.0 pg    MCHC 32.5 32.0 - 36.0 g/dL    RDW 15.9 (H) 11.5 - 14.5 %    Platelets 201 150 - 450 x10*3/uL    Neutrophils % 69.3 40.0 - 80.0 %    Immature Granulocytes %, Automated 0.4 0.0 - 0.9 %    Lymphocytes % 16.6 13.0 - 44.0 %    Monocytes % 13.3 2.0 - 10.0 %    Eosinophils % 0.0 0.0 - 6.0 %    Basophils % 0.4 0.0 - 2.0 %    Neutrophils Absolute 4.64 1.20 - 7.70 x10*3/uL    Immature Granulocytes Absolute, Automated 0.03 0.00 - 0.70 x10*3/uL    Lymphocytes Absolute 1.11 (L) 1.20 - 4.80 x10*3/uL    Monocytes Absolute 0.89 0.10 - 1.00 x10*3/uL    Eosinophils Absolute 0.00 0.00 - 0.70 x10*3/uL    Basophils Absolute 0.03 0.00 - 0.10 x10*3/uL   Comprehensive metabolic panel   Result Value Ref Range    Glucose 102 (H) 74 - 99 mg/dL    Sodium 137 136 - 145 mmol/L    Potassium 3.5 3.5 - 5.3 mmol/L    Chloride 100 98 - 107 mmol/L    Bicarbonate 26 21 - 32 mmol/L    Anion Gap 15 10 - 20 mmol/L    Urea Nitrogen 7 6 - 23 mg/dL    Creatinine 0.88 0.50 - 1.05 mg/dL    eGFR 75 >60 mL/min/1.73m*2    Calcium 9.0 8.6 - 10.3 mg/dL    Albumin 4.0 3.4 - 5.0 g/dL    Alkaline Phosphatase 91 33 - 136 U/L    Total Protein 6.5 6.4 - 8.2 g/dL    AST 28 9 - 39 U/L    Bilirubin, Total 0.3 0.0 - 1.2 mg/dL    ALT 22 7 - 45 U/L   Blood  Gas Venous   Result Value Ref Range    POCT pH, Venous 7.40 7.33 - 7.43 pH    POCT pCO2, Venous 48 41 - 51 mm Hg    POCT pO2, Venous 30 (L) 35 - 45 mm Hg    POCT SO2, Venous 38 (L) 45 - 75 %    POCT Oxy Hemoglobin, Venous 36.7 (L) 45.0 - 75.0 %    POCT Base Excess, Venous 4.0 (H) -2.0 - 3.0 mmol/L    POCT HCO3 Calculated, Venous 29.7 (H) 22.0 - 26.0 mmol/L    Patient Temperature 37.0 degrees Celsius    FiO2 95 %   Urinalysis with Reflex Culture and Microscopic   Result Value Ref Range    Color, Urine Light-Yellow Light-Yellow, Yellow, Dark-Yellow    Appearance, Urine Turbid (N) Clear    Specific Gravity, Urine 1.010 1.005 - 1.035    pH, Urine 5.5 5.0, 5.5, 6.0, 6.5, 7.0, 7.5, 8.0    Protein, Urine 10 (TRACE) NEGATIVE, 10 (TRACE), 20 (TRACE) mg/dL    Glucose, Urine Normal Normal mg/dL    Blood, Urine NEGATIVE NEGATIVE    Ketones, Urine NEGATIVE NEGATIVE mg/dL    Bilirubin, Urine NEGATIVE NEGATIVE    Urobilinogen, Urine Normal Normal mg/dL    Nitrite, Urine NEGATIVE NEGATIVE    Leukocyte Esterase, Urine 500 Rosa/µL (A) NEGATIVE   Microscopic Only, Urine   Result Value Ref Range    WBC, Urine >50 (A) 1-5, NONE /HPF    WBC Clumps, Urine OCCASIONAL Reference range not established. /HPF    RBC, Urine 1-2 NONE, 1-2, 3-5 /HPF    Squamous Epithelial Cells, Urine 1-9 (SPARSE) Reference range not established. /HPF    Bacteria, Urine 1+ (A) NONE SEEN /HPF    Mucus, Urine FEW Reference range not established. /LPF   ECG 12 lead   Result Value Ref Range    Ventricular Rate 87 BPM    Atrial Rate 87 BPM    UT Interval 190 ms    QRS Duration 92 ms    QT Interval 356 ms    QTC Calculation(Bazett) 428 ms    P Axis 68 degrees    R Axis 68 degrees    T Axis 69 degrees    QRS Count 15 beats    Q Onset 226 ms    P Onset 131 ms    P Offset 188 ms    T Offset 404 ms    QTC Fredericia 402 ms   POCT GLUCOSE   Result Value Ref Range    POCT Glucose 89 74 - 99 mg/dL   Sars-CoV-2 PCR   Result Value Ref Range    Coronavirus 2019, PCR Detected (A)  Not Detected   Influenza A, and B PCR   Result Value Ref Range    Flu A Result Not Detected Not Detected    Flu B Result Not Detected Not Detected   CBC   Result Value Ref Range    WBC 5.2 4.4 - 11.3 x10*3/uL    nRBC 0.0 0.0 - 0.0 /100 WBCs    RBC 4.68 4.00 - 5.20 x10*6/uL    Hemoglobin 12.6 12.0 - 16.0 g/dL    Hematocrit 38.4 36.0 - 46.0 %    MCV 82 80 - 100 fL    MCH 26.9 26.0 - 34.0 pg    MCHC 32.8 32.0 - 36.0 g/dL    RDW 15.9 (H) 11.5 - 14.5 %    Platelets 170 150 - 450 x10*3/uL   Basic metabolic panel   Result Value Ref Range    Glucose 92 74 - 99 mg/dL    Sodium 139 136 - 145 mmol/L    Potassium 3.2 (L) 3.5 - 5.3 mmol/L    Chloride 104 98 - 107 mmol/L    Bicarbonate 24 21 - 32 mmol/L    Anion Gap 14 10 - 20 mmol/L    Urea Nitrogen 6 6 - 23 mg/dL    Creatinine 0.75 0.50 - 1.05 mg/dL    eGFR >90 >60 mL/min/1.73m*2    Calcium 8.4 (L) 8.6 - 10.3 mg/dL             Assessment/Plan      COVID 19  -Isolation precautions.   -On decadron as she had some mild hypoxia.   -CXR with no acute abnormalities.   -Supportive care.     COPD Exacerbation  -Pulmonary to see.   -Had some mild hypoxia, was on 2L NC, on room air on my exam, sats 92%. Monitor.   -On Decadron.   -Continue IBDs.   -Still with some wheeze on exam.     Dizziness / Headache  -Still with persistent severe headache. Does admit to migraine history.   -CTA head and neck was done with no acute findings.  -Will trial a dose of toradol.   -Ask Neuro to see as it is not improving.   -Could have some relation to the COVID?  -PT/OT evals.     UTI  -UA suggests UTI.   -Awaiting urine culture.   -Continue IV ceftriaxone.     Chronic Back Pain  -Follows with pain management.   -Continue Lyrica.     Hx CVA  -Continue aspirin and statin.     Anxiety / ADHD  -On Vyvanse at home, we do not carry. Told her to have family bring in to take.   -Continue other home meds.     DVT Risk  -Lovenox subcutaneous.     Plan  Pulmonary and Neurology to see.   Continue decadron and IBDs.    Monitor sats.   Still with significant headache, will trial a dose of toradol.   PT/OT evals.   Continue abx for UTI, awaiting urine culture.         Mary Slaughter, WILLEM-CNP

## 2024-10-18 NOTE — CONSULTS
Inpatient consult to Pulmonology  Consult performed by: Anupama Matias, APRN-CNP  Consult ordered by: Ryley Delvalle MD  Reason for consult: Acute COPD exacerbation          Reason For Consult  COVID-19, acute hypoxic respiratory failure, acute COPD exacerbation    History Of Present Illness  Mona Sanders is a 59 y.o. female with a past medical history of  CVA, COPD, tobacco use  and migraine headaches who presented to [facility] Emergency Department last night for evaluation of dizziness, headache, nausea and weakness. She was hypoxic upon arrival 93% on room air and placed on 2 L nasal cannula oxygen to maintain O2 sats greater than or equal to 92%.  She denies fevers, chills, vomiting, diarrhea, chest or abdominal pain.     Past Medical History  Past Medical History:   Diagnosis Date    Acute exacerbation of chronic obstructive pulmonary disease (Multi) 03/21/2024    Anxiety     Colon cancer screening 07/30/2024    Depression     FHx: atrial fibrillation     Mother/Patient denies any personal history.    History of anemia     HLD (hyperlipidemia)     Hypertension     Incontinence     Ingrown toenail of left foot     Chronic pain    Iron deficiency anemia     Nephrolithiasis     PSH Stone extraction    Nicotine dependence     OAB (overactive bladder)     Neuromuscular dysfunction of bladder    Opiate dependence     Pain in left hip 04/13/2022    Left hip pain    Right knee pain 06/02/2023    Rotator cuff injury, left, sequela     SOB (shortness of breath)     Spondylosis of cervical spine     Spondylosis of lumbar spine     Stroke (cerebrum)     Right thalmic/Residual vision issues.    Transient cerebral ischemic attack 10/14/2021    Tubal pregnancy ([facility])     PSH       Surgical History  Past Surgical History:   Procedure Laterality Date    CT ANGIO NECK  10/14/2021    CT NECK ANGIO W AND WO IV CONTRAST 10/14/2021 [facility]    CT HEAD ANGIO W AND WO IV CONTRAST   10/14/2021    CT HEAD ANGIO W AND WO IV CONTRAST 10/14/2021 [facility]    ECTOPIC PREGNANCY SURGERY      HIP ARTHROPLASTY  08/31/2020    MR HEAD ANGIO WO IV CONTRAST  06/02/2020    MR HEAD ANGIO WO IV CONTRAST 6/2/2020 [facility]ICAL [facility]    MR HEAD ANGIO WO IV CONTRAST  08/10/2022    MR HEAD ANGIO WO IV CONTRAST 8/10/2022 [facility]ICAL [facility]    MR HEAD ANGIO WO IV CONTRAST  06/24/2019    MR HEAD ANGIO WO IV CONTRAST [facility]    MR NECK ANGIO WO IV CONTRAST  06/02/2020    MR NECK ANGIO WO IV CONTRAST 6/2/2020 [facility]    MR NECK ANGIO WO IV CONTRAST  08/10/2022    MR NECK ANGIO WO IV CONTRAST 8/10/2022 [facility]    URETEROLITHOTOMY          Social History  Social History     Tobacco Use    Smoking status: Every Day     Types: Cigarettes    Smokeless tobacco: Never   Substance Use Topics    Alcohol use: Never    Drug use: Never       Family History  Family History   Problem Relation Name Age of Onset    Atrial fibrillation Mother      Diabetes Father      Hypertension Father         Allergies  Tramadol, Bupropion, and Wellbutrin [bupropion hcl]    Review of Systems   Constitutional:  Positive for fatigue.   HENT: Negative.     Eyes: Negative.    Respiratory:  Positive for shortness of breath.    Cardiovascular: Negative.    Gastrointestinal:  Positive for nausea.   Endocrine: Negative.    Genitourinary: Negative.    Musculoskeletal: Negative.    Allergic/Immunologic: Negative.    Neurological:  Positive for dizziness and headaches.   Hematological: Negative.    Psychiatric/Behavioral: Negative.          Physical Exam  Vitals and nursing note reviewed.   Constitutional:       Appearance: Normal appearance.   HENT:      Head: Normocephalic and atraumatic.      Nose: Nose normal.      Mouth/Throat:      Mouth: Mucous membranes are moist.   Eyes:      Extraocular Movements: Extraocular movements intact.      Conjunctiva/sclera: Conjunctivae normal.      Pupils: Pupils are  "equal, round, and reactive to light.   Cardiovascular:      Rate and Rhythm: Normal rate and regular rhythm.      Pulses: Normal pulses.      Heart sounds: Normal heart sounds.   Pulmonary:      Effort: Pulmonary effort is normal.      Breath sounds: Wheezing present.      Comments: Lungs diminished with mild end expiratory wheezes bilaterally.  Abdominal:      General: Bowel sounds are normal.      Palpations: Abdomen is soft.   Musculoskeletal:         General: Normal range of motion.   Skin:     General: Skin is warm and dry.      Capillary Refill: Capillary refill takes less than 2 seconds.   Neurological:      General: No focal deficit present.      Mental Status: She is alert and oriented to person, place, and time.   Psychiatric:         Mood and Affect: Mood normal.         Behavior: Behavior normal.          Vital Signs  Blood pressure 92/66, pulse 88, temperature 36.5 °C (97.7 °F), temperature source Temporal, resp. rate 20, height 1.676 m (5' 6\"), weight 77.6 kg (171 lb), SpO2 92%.  Oxygen Therapy  SpO2: 92 %  Medical Gas Therapy: Supplemental oxygen  Medical Gas Delivery Method: Nasal cannula       Intake/Output Summary (Last 24 hours) at 10/18/2024 1015  Last data filed at 10/18/2024 0817  Gross per 24 hour   Intake 750 ml   Output 450 ml   Net 300 ml      Scheduled medications:  aspirin, 81 mg, oral, Daily  atorvastatin, 40 mg, oral, Nightly  cefTRIAXone, 1 g, intravenous, q24h  dexAMETHasone, 6 mg, oral, Daily with breakfast  DULoxetine, 30 mg, oral, Daily  enoxaparin, 40 mg, subcutaneous, Daily  hydrOXYzine HCL, 50 mg, oral, BID  ipratropium-albuteroL, 3 mL, nebulization, TID  nicotine, 1 patch, transdermal, Daily   Followed by  [START ON 11/29/2024] nicotine, 1 patch, transdermal, Daily   Followed by  [START ON 12/13/2024] nicotine, 1 patch, transdermal, Daily  nicotine, 1 patch, transdermal, Once  oxygen, , inhalation, q8h  polyethylene glycol, 17 g, oral, Daily  potassium chloride CR, 40 mEq, " oral, Once  pregabalin, 150 mg, oral, TID       PRN medications: acetaminophen **OR** acetaminophen **OR** acetaminophen, ondansetron ODT **OR** ondansetron       Relevant Results  Results for orders placed or performed during the hospital encounter of 10/17/24 (from the past 24 hours)   POCT GLUCOSE   Result Value Ref Range    POCT Glucose 121 (H) 74 - 99 mg/dL   Magnesium   Result Value Ref Range    Magnesium 1.70 1.60 - 2.40 mg/dL   CBC and Auto Differential   Result Value Ref Range    WBC 6.7 4.4 - 11.3 x10*3/uL    nRBC 0.0 0.0 - 0.0 /100 WBCs    RBC 5.18 4.00 - 5.20 x10*6/uL    Hemoglobin 13.8 12.0 - 16.0 g/dL    Hematocrit 42.5 36.0 - 46.0 %    MCV 82 80 - 100 fL    MCH 26.6 26.0 - 34.0 pg    MCHC 32.5 32.0 - 36.0 g/dL    RDW 15.9 (H) 11.5 - 14.5 %    Platelets 201 150 - 450 x10*3/uL    Neutrophils % 69.3 40.0 - 80.0 %    Immature Granulocytes %, Automated 0.4 0.0 - 0.9 %    Lymphocytes % 16.6 13.0 - 44.0 %    Monocytes % 13.3 2.0 - 10.0 %    Eosinophils % 0.0 0.0 - 6.0 %    Basophils % 0.4 0.0 - 2.0 %    Neutrophils Absolute 4.64 1.20 - 7.70 x10*3/uL    Immature Granulocytes Absolute, Automated 0.03 0.00 - 0.70 x10*3/uL    Lymphocytes Absolute 1.11 (L) 1.20 - 4.80 x10*3/uL    Monocytes Absolute 0.89 0.10 - 1.00 x10*3/uL    Eosinophils Absolute 0.00 0.00 - 0.70 x10*3/uL    Basophils Absolute 0.03 0.00 - 0.10 x10*3/uL   Comprehensive metabolic panel   Result Value Ref Range    Glucose 102 (H) 74 - 99 mg/dL    Sodium 137 136 - 145 mmol/L    Potassium 3.5 3.5 - 5.3 mmol/L    Chloride 100 98 - 107 mmol/L    Bicarbonate 26 21 - 32 mmol/L    Anion Gap 15 10 - 20 mmol/L    Urea Nitrogen 7 6 - 23 mg/dL    Creatinine 0.88 0.50 - 1.05 mg/dL    eGFR 75 >60 mL/min/1.73m*2    Calcium 9.0 8.6 - 10.3 mg/dL    Albumin 4.0 3.4 - 5.0 g/dL    Alkaline Phosphatase 91 33 - 136 U/L    Total Protein 6.5 6.4 - 8.2 g/dL    AST 28 9 - 39 U/L    Bilirubin, Total 0.3 0.0 - 1.2 mg/dL    ALT 22 7 - 45 U/L   Blood Gas Venous   Result Value  Ref Range    POCT pH, Venous 7.40 7.33 - 7.43 pH    POCT pCO2, Venous 48 41 - 51 mm Hg    POCT pO2, Venous 30 (L) 35 - 45 mm Hg    POCT SO2, Venous 38 (L) 45 - 75 %    POCT Oxy Hemoglobin, Venous 36.7 (L) 45.0 - 75.0 %    POCT Base Excess, Venous 4.0 (H) -2.0 - 3.0 mmol/L    POCT HCO3 Calculated, Venous 29.7 (H) 22.0 - 26.0 mmol/L    Patient Temperature 37.0 degrees Celsius    FiO2 95 %   Urinalysis with Reflex Culture and Microscopic   Result Value Ref Range    Color, Urine Light-Yellow Light-Yellow, Yellow, Dark-Yellow    Appearance, Urine Turbid (N) Clear    Specific Gravity, Urine 1.010 1.005 - 1.035    pH, Urine 5.5 5.0, 5.5, 6.0, 6.5, 7.0, 7.5, 8.0    Protein, Urine 10 (TRACE) NEGATIVE, 10 (TRACE), 20 (TRACE) mg/dL    Glucose, Urine Normal Normal mg/dL    Blood, Urine NEGATIVE NEGATIVE    Ketones, Urine NEGATIVE NEGATIVE mg/dL    Bilirubin, Urine NEGATIVE NEGATIVE    Urobilinogen, Urine Normal Normal mg/dL    Nitrite, Urine NEGATIVE NEGATIVE    Leukocyte Esterase, Urine 500 Rosa/µL (A) NEGATIVE   Microscopic Only, Urine   Result Value Ref Range    WBC, Urine >50 (A) 1-5, NONE /HPF    WBC Clumps, Urine OCCASIONAL Reference range not established. /HPF    RBC, Urine 1-2 NONE, 1-2, 3-5 /HPF    Squamous Epithelial Cells, Urine 1-9 (SPARSE) Reference range not established. /HPF    Bacteria, Urine 1+ (A) NONE SEEN /HPF    Mucus, Urine FEW Reference range not established. /LPF   ECG 12 lead   Result Value Ref Range    Ventricular Rate 87 BPM    Atrial Rate 87 BPM    ND Interval 190 ms    QRS Duration 92 ms    QT Interval 356 ms    QTC Calculation(Bazett) 428 ms    P Axis 68 degrees    R Axis 68 degrees    T Axis 69 degrees    QRS Count 15 beats    Q Onset 226 ms    P Onset 131 ms    P Offset 188 ms    T Offset 404 ms    QTC Fredericia 402 ms   POCT GLUCOSE   Result Value Ref Range    POCT Glucose 89 74 - 99 mg/dL   Sars-CoV-2 PCR   Result Value Ref Range    Coronavirus 2019, PCR Detected (A) Not Detected   Influenza A,  and B PCR   Result Value Ref Range    Flu A Result Not Detected Not Detected    Flu B Result Not Detected Not Detected   CBC   Result Value Ref Range    WBC 5.2 4.4 - 11.3 x10*3/uL    nRBC 0.0 0.0 - 0.0 /100 WBCs    RBC 4.68 4.00 - 5.20 x10*6/uL    Hemoglobin 12.6 12.0 - 16.0 g/dL    Hematocrit 38.4 36.0 - 46.0 %    MCV 82 80 - 100 fL    MCH 26.9 26.0 - 34.0 pg    MCHC 32.8 32.0 - 36.0 g/dL    RDW 15.9 (H) 11.5 - 14.5 %    Platelets 170 150 - 450 x10*3/uL   Basic metabolic panel   Result Value Ref Range    Glucose 92 74 - 99 mg/dL    Sodium 139 136 - 145 mmol/L    Potassium 3.2 (L) 3.5 - 5.3 mmol/L    Chloride 104 98 - 107 mmol/L    Bicarbonate 24 21 - 32 mmol/L    Anion Gap 14 10 - 20 mmol/L    Urea Nitrogen 6 6 - 23 mg/dL    Creatinine 0.75 0.50 - 1.05 mg/dL    eGFR >90 >60 mL/min/1.73m*2    Calcium 8.4 (L) 8.6 - 10.3 mg/dL          XR chest 2 views  Result Date: 10/17/2024  FINDINGS: PA and lateral radiographs of the chest were provided.       CARDIOMEDIASTINAL SILHOUETTE: Cardiomediastinal silhouette is normal in size and configuration.   LUNGS: Lungs are clear.   ABDOMEN: No remarkable upper abdominal findings.   BONES: No acute osseous changes.  1.  No evidence of acute cardiopulmonary process.          CT angio head and neck w and wo IV contrast  Result Date: 10/17/2024  FINDINGS: Noncontrast axial CT images of the head do not demonstrate any evidence of hyperdense intracranial hemorrhage. There is no mass effect or midline shift.   Geographic area of cystic encephalomalacia and gliosis is present in the right frontal lobe, likely representing sequela of prior infarct/injury, similar to prior exam in August of 2022. Subtle attenuation changes present in the bilateral cerebral hemispheres likely represent sequela of microvascular disease.   No abnormal ventricular dilatation is present. Basal cisterns are patent. No extra-axial fluid collections are identified.   Scalp soft tissues do not demonstrate any acute  abnormality. No acute calvarial abnormality is evident. Mastoid air cells and middle ear cavities are clear.   Visualized paranasal sinuses are unremarkable in appearance.   CTA HEAD FINDINGS:   CT images of the head is somewhat degraded by motion.   Intracranial carotid arteries demonstrate symmetric opacification bilaterally without evidence of occlusion or high-grade stenosis. Carotid terminals are symmetric in appearance.   The anterior cerebral arteries are supplied predominantly by the right A1 with aplastic/hypoplastic left A1. More distally the anterior cerebral arteries demonstrate preserved opacification without evidence of occlusion.   Middle cerebral arteries are symmetric in appearance without evidence of high-grade stenosis in the M1 segments or focal vessel occlusion in the more distal cortical M2 and M3 branches.   Intracranial vertebral arteries demonstrate anatomic variant dominant right vertebral artery without evidence of occlusion. Basilar artery slightly diminutive in appearance without evidence of occlusion.   The posterior cerebral arteries receive code dominant supply from the posterior communicating arteries bilaterally and demonstrate symmetric opacification distally without evidence of occlusion.   No enhancing masses or vascular malformations are identified. No thrombus is identified in the opacified dural venous sinuses.   CTA NECK FINDINGS:   Evaluation of the upper chest and lower neck is degraded by combination of motion and beam hardening artifact from the contrast bolus in the left subclavian vein.   Within limitations of the study a 4 vessel aortic arch is present, with direct origin of the left vertebral artery from the aortic arch. Minimal atherosclerotic changes are present to the arch in the origin of the great vessels without significant stenosis.   Common carotid arteries demonstrate symmetric opacification bilaterally without evidence of occlusion or stenosis. There is  somewhat retropharyngeal course of the right common carotid artery and proximal right extracranial internal carotid artery.   Extracranial internal carotid arteries demonstrate symmetric opacification without evidence of occlusion or significant stenosis by NASCET criteria.   Extracranial vertebral arteries demonstrate symmetric opacification without evidence of occlusion or stenosis. The right vertebral artery originates from the right subclavian artery.   Soft tissues of the neck and skull base do not demonstrate any acute abnormality.   Multilevel degenerative changes are present in the cervical spine without evidence of compression fracture or high-grade stenosis.   1. Noncontrast CT images of the head do not demonstrate any evidence of hemorrhage, CT apparent transcortical infarct, or other emergent intracranial abnormality. 2. No large vessel occlusion is identified in the proximal intracranial circulation. No significant stenosis is present in the large arteries of the neck. 3. Geographic area of cystic encephalomalacia and gliosis in the right frontal lobe likely represent sequela of prior infarct/injury, similar in appearance to prior exam in August of 2022. Subtle attenuation changes in the bilateral cerebral hemispheric white matter likely represent sequela of microvascular disease.        Assessment/Plan   Acute hypoxic respiratory failure  Wean oxygen as sats allow  Continuous pulse oximetry  Incentive spirometry/pulmonary hygiene    COVID-19  Continue oral dexamethasone    Acute COPD exacerbation  Continue IBD/ICS  FEV1 as outpatient    Urinary tract infection   IV ceftriaxone  Follow-up culture    Dizziness/History of migraine headaches  Analgesia  Neurology consultation    Tobacco use  Smoking cessation  Nicotine patch    Prophylaxis  Subcutaneous Lovenox    PT/OT/out of bed    WILLEM Katz-CNP

## 2024-10-18 NOTE — NURSING NOTE
Patient arrived from ED to room 457, daughter at bedside. Patient oriented to room, call light functions and nursing staff. Call light within reach.

## 2024-10-18 NOTE — CARE PLAN
The patient's goals for the shift include      The clinical goals for the shift include IV antibiotic, monitor labs and vitals      Problem: Pain - Adult  Goal: Verbalizes/displays adequate comfort level or baseline comfort level  Outcome: Progressing     Problem: Safety - Adult  Goal: Free from fall injury  Outcome: Progressing     Problem: Discharge Planning  Goal: Discharge to home or other facility with appropriate resources  Outcome: Progressing     Problem: Chronic Conditions and Co-morbidities  Goal: Patient's chronic conditions and co-morbidity symptoms are monitored and maintained or improved  Outcome: Progressing     Problem: Pain  Goal: Takes deep breaths with improved pain control throughout the shift  Outcome: Progressing  Goal: Turns in bed with improved pain control throughout the shift  Outcome: Progressing  Goal: Walks with improved pain control throughout the shift  Outcome: Progressing  Goal: Performs ADL's with improved pain control throughout shift  Outcome: Progressing  Goal: Participates in PT with improved pain control throughout the shift  Outcome: Progressing  Goal: Free from opioid side effects throughout the shift  Outcome: Progressing  Goal: Free from acute confusion related to pain meds throughout the shift  Outcome: Progressing

## 2024-10-18 NOTE — CARE PLAN
The patient's goals for the shift include      The clinical goals for the shift include safety, monitor labs and vitals    Problem: Pain - Adult  Goal: Verbalizes/displays adequate comfort level or baseline comfort level  Outcome: Progressing     Problem: Safety - Adult  Goal: Free from fall injury  Outcome: Progressing     Problem: Discharge Planning  Goal: Discharge to home or other facility with appropriate resources  Outcome: Progressing     Problem: Chronic Conditions and Co-morbidities  Goal: Patient's chronic conditions and co-morbidity symptoms are monitored and maintained or improved  Outcome: Progressing     Problem: Pain  Goal: Takes deep breaths with improved pain control throughout the shift  Outcome: Progressing  Goal: Turns in bed with improved pain control throughout the shift  Outcome: Progressing  Goal: Walks with improved pain control throughout the shift  Outcome: Progressing  Goal: Performs ADL's with improved pain control throughout shift  Outcome: Progressing  Goal: Participates in PT with improved pain control throughout the shift  Outcome: Progressing  Goal: Free from opioid side effects throughout the shift  Outcome: Progressing  Goal: Free from acute confusion related to pain meds throughout the shift  Outcome: Progressing

## 2024-10-18 NOTE — PROGRESS NOTES
10/18/24 1624   Discharge Planning   Living Arrangements Parent   Support Systems Children   Assistance Needed independent   Type of Residence Private residence   Who is requesting discharge planning? Patient   Home or Post Acute Services None   Expected Discharge Disposition Home   Does the patient need discharge transport arranged? No     hOme , no needs

## 2024-10-19 LAB
ANION GAP SERPL CALCULATED.3IONS-SCNC: 11 MMOL/L (ref 10–20)
BUN SERPL-MCNC: 12 MG/DL (ref 6–23)
CALCIUM SERPL-MCNC: 9.2 MG/DL (ref 8.6–10.3)
CHLORIDE SERPL-SCNC: 108 MMOL/L (ref 98–107)
CO2 SERPL-SCNC: 25 MMOL/L (ref 21–32)
CREAT SERPL-MCNC: 0.69 MG/DL (ref 0.5–1.05)
EGFRCR SERPLBLD CKD-EPI 2021: >90 ML/MIN/1.73M*2
ERYTHROCYTE [DISTWIDTH] IN BLOOD BY AUTOMATED COUNT: 15.6 % (ref 11.5–14.5)
GLUCOSE SERPL-MCNC: 126 MG/DL (ref 74–99)
HCT VFR BLD AUTO: 40.3 % (ref 36–46)
HGB BLD-MCNC: 13.1 G/DL (ref 12–16)
MCH RBC QN AUTO: 26.7 PG (ref 26–34)
MCHC RBC AUTO-ENTMCNC: 32.5 G/DL (ref 32–36)
MCV RBC AUTO: 82 FL (ref 80–100)
NRBC BLD-RTO: 0 /100 WBCS (ref 0–0)
PLATELET # BLD AUTO: 197 X10*3/UL (ref 150–450)
POTASSIUM SERPL-SCNC: 4.6 MMOL/L (ref 3.5–5.3)
RBC # BLD AUTO: 4.9 X10*6/UL (ref 4–5.2)
SODIUM SERPL-SCNC: 139 MMOL/L (ref 136–145)
WBC # BLD AUTO: 3.5 X10*3/UL (ref 4.4–11.3)

## 2024-10-19 PROCEDURE — 2500000004 HC RX 250 GENERAL PHARMACY W/ HCPCS (ALT 636 FOR OP/ED): Performed by: STUDENT IN AN ORGANIZED HEALTH CARE EDUCATION/TRAINING PROGRAM

## 2024-10-19 PROCEDURE — 97161 PT EVAL LOW COMPLEX 20 MIN: CPT | Mod: GP

## 2024-10-19 PROCEDURE — 9420000001 HC RT PATIENT EDUCATION 5 MIN

## 2024-10-19 PROCEDURE — 36415 COLL VENOUS BLD VENIPUNCTURE: CPT | Performed by: NURSE PRACTITIONER

## 2024-10-19 PROCEDURE — 1100000001 HC PRIVATE ROOM DAILY

## 2024-10-19 PROCEDURE — 97165 OT EVAL LOW COMPLEX 30 MIN: CPT | Mod: GO

## 2024-10-19 PROCEDURE — 2500000001 HC RX 250 WO HCPCS SELF ADMINISTERED DRUGS (ALT 637 FOR MEDICARE OP): Performed by: NURSE PRACTITIONER

## 2024-10-19 PROCEDURE — 2500000005 HC RX 250 GENERAL PHARMACY W/O HCPCS: Performed by: STUDENT IN AN ORGANIZED HEALTH CARE EDUCATION/TRAINING PROGRAM

## 2024-10-19 PROCEDURE — 85027 COMPLETE CBC AUTOMATED: CPT | Performed by: NURSE PRACTITIONER

## 2024-10-19 PROCEDURE — 94640 AIRWAY INHALATION TREATMENT: CPT

## 2024-10-19 PROCEDURE — 80048 BASIC METABOLIC PNL TOTAL CA: CPT | Performed by: NURSE PRACTITIONER

## 2024-10-19 PROCEDURE — S4991 NICOTINE PATCH NONLEGEND: HCPCS | Performed by: STUDENT IN AN ORGANIZED HEALTH CARE EDUCATION/TRAINING PROGRAM

## 2024-10-19 PROCEDURE — 97535 SELF CARE MNGMENT TRAINING: CPT | Mod: GO

## 2024-10-19 PROCEDURE — 2500000002 HC RX 250 W HCPCS SELF ADMINISTERED DRUGS (ALT 637 FOR MEDICARE OP, ALT 636 FOR OP/ED): Performed by: STUDENT IN AN ORGANIZED HEALTH CARE EDUCATION/TRAINING PROGRAM

## 2024-10-19 PROCEDURE — 2500000001 HC RX 250 WO HCPCS SELF ADMINISTERED DRUGS (ALT 637 FOR MEDICARE OP): Performed by: STUDENT IN AN ORGANIZED HEALTH CARE EDUCATION/TRAINING PROGRAM

## 2024-10-19 PROCEDURE — 2500000004 HC RX 250 GENERAL PHARMACY W/ HCPCS (ALT 636 FOR OP/ED): Performed by: NURSE PRACTITIONER

## 2024-10-19 PROCEDURE — 99232 SBSQ HOSP IP/OBS MODERATE 35: CPT | Performed by: NURSE PRACTITIONER

## 2024-10-19 RX ORDER — PREGABALIN 150 MG/1
300 CAPSULE ORAL 2 TIMES DAILY
Status: DISCONTINUED | OUTPATIENT
Start: 2024-10-19 | End: 2024-10-19

## 2024-10-19 RX ORDER — PREGABALIN 150 MG/1
300 CAPSULE ORAL 2 TIMES DAILY
Status: DISCONTINUED | OUTPATIENT
Start: 2024-10-19 | End: 2024-10-21 | Stop reason: HOSPADM

## 2024-10-19 RX ORDER — KETOROLAC TROMETHAMINE 30 MG/ML
30 INJECTION, SOLUTION INTRAMUSCULAR; INTRAVENOUS ONCE
Status: COMPLETED | OUTPATIENT
Start: 2024-10-19 | End: 2024-10-19

## 2024-10-19 ASSESSMENT — PAIN DESCRIPTION - LOCATION
LOCATION: HEAD
LOCATION: HIP

## 2024-10-19 ASSESSMENT — COGNITIVE AND FUNCTIONAL STATUS - GENERAL
CLIMB 3 TO 5 STEPS WITH RAILING: A LITTLE
STANDING UP FROM CHAIR USING ARMS: A LITTLE
MOBILITY SCORE: 21
WALKING IN HOSPITAL ROOM: A LITTLE
DAILY ACTIVITIY SCORE: 24

## 2024-10-19 ASSESSMENT — PAIN SCALES - GENERAL
PAINLEVEL_OUTOF10: 8
PAINLEVEL_OUTOF10: 8
PAINLEVEL_OUTOF10: 0 - NO PAIN
PAINLEVEL_OUTOF10: 7
PAINLEVEL_OUTOF10: 0 - NO PAIN
PAINLEVEL_OUTOF10: 9
PAINLEVEL_OUTOF10: 4
PAINLEVEL_OUTOF10: 9
PAINLEVEL_OUTOF10: 3

## 2024-10-19 ASSESSMENT — ACTIVITIES OF DAILY LIVING (ADL)
ADL_ASSISTANCE: INDEPENDENT
HOME_MANAGEMENT_TIME_ENTRY: 11
BATHING_ASSISTANCE: INDEPENDENT
ADL_ASSISTANCE: INDEPENDENT

## 2024-10-19 ASSESSMENT — PAIN - FUNCTIONAL ASSESSMENT
PAIN_FUNCTIONAL_ASSESSMENT: 0-10

## 2024-10-19 ASSESSMENT — PAIN DESCRIPTION - DESCRIPTORS: DESCRIPTORS: ACHING

## 2024-10-19 ASSESSMENT — PAIN DESCRIPTION - ORIENTATION: ORIENTATION: LEFT;RIGHT

## 2024-10-19 NOTE — PROGRESS NOTES
Mona Sanders is a 59 y.o. female on day 2 of admission presenting with Chronic obstructive pulmonary disease with acute exacerbation (Multi).      Subjective   Patient seen and examined. Resting in bed. She feels a little better today, headache was better and then just returned 9/10. Still with some vision issues which she states from time to time since her stroke. Breathing still labored at times, she is on 2L NC. Afebrile.        Objective     Last Recorded Vitals  /65 (BP Location: Left arm, Patient Position: Lying)   Pulse 79   Temp 36.9 °C (98.4 °F) (Temporal)   Resp 17   Wt 77.6 kg (171 lb)   SpO2 94%   Intake/Output last 3 Shifts:    Intake/Output Summary (Last 24 hours) at 10/19/2024 1129  Last data filed at 10/19/2024 1004  Gross per 24 hour   Intake 950 ml   Output 800 ml   Net 150 ml       Admission Weight  Weight: 77.6 kg (171 lb 1.2 oz) (10/17/24 1904)    Daily Weight  10/17/24 : 77.6 kg (171 lb)    Image Results    No new imaging to review.     Physical Exam    General: Alert and oriented x3, pleasant.   Cardiac: Regular rate and rhythm, S1/S2 , no murmur.   Pulmonary: Diminished with some diffuse exp wheeze on 2L NC.   Abdomen: Soft, round, nontender. BS +x4.   Extremities: No edema. Equal strength in all extremities.   Skin: No rashes or lesions.    Relevant Results    Scheduled medications  aspirin, 81 mg, oral, Daily  atorvastatin, 40 mg, oral, Nightly  cefTRIAXone, 1 g, intravenous, q24h  dexAMETHasone, 6 mg, oral, Daily with breakfast  DULoxetine, 30 mg, oral, Daily  enoxaparin, 40 mg, subcutaneous, Daily  hydrOXYzine HCL, 50 mg, oral, BID  ipratropium-albuteroL, 3 mL, nebulization, TID  nicotine, 1 patch, transdermal, Daily   Followed by  [START ON 11/29/2024] nicotine, 1 patch, transdermal, Daily   Followed by  [START ON 12/13/2024] nicotine, 1 patch, transdermal, Daily  oxygen, , inhalation, q8h  polyethylene glycol, 17 g, oral, Daily  pregabalin, 300 mg, oral,  BID      Continuous medications     PRN medications  PRN medications: acetaminophen **OR** acetaminophen **OR** acetaminophen, ondansetron ODT **OR** ondansetron     Results for orders placed or performed during the hospital encounter of 10/17/24 (from the past 24 hours)   Basic Metabolic Panel   Result Value Ref Range    Glucose 126 (H) 74 - 99 mg/dL    Sodium 139 136 - 145 mmol/L    Potassium 4.6 3.5 - 5.3 mmol/L    Chloride 108 (H) 98 - 107 mmol/L    Bicarbonate 25 21 - 32 mmol/L    Anion Gap 11 10 - 20 mmol/L    Urea Nitrogen 12 6 - 23 mg/dL    Creatinine 0.69 0.50 - 1.05 mg/dL    eGFR >90 >60 mL/min/1.73m*2    Calcium 9.2 8.6 - 10.3 mg/dL   CBC   Result Value Ref Range    WBC 3.5 (L) 4.4 - 11.3 x10*3/uL    nRBC 0.0 0.0 - 0.0 /100 WBCs    RBC 4.90 4.00 - 5.20 x10*6/uL    Hemoglobin 13.1 12.0 - 16.0 g/dL    Hematocrit 40.3 36.0 - 46.0 %    MCV 82 80 - 100 fL    MCH 26.7 26.0 - 34.0 pg    MCHC 32.5 32.0 - 36.0 g/dL    RDW 15.6 (H) 11.5 - 14.5 %    Platelets 197 150 - 450 x10*3/uL             Assessment/Plan      COVID 19  -Isolation precautions.   -On decadron as she was hypoxic. Continues to require supplemental O2 but improving.    -CXR with no acute abnormalities.   -Supportive care.      COPD Exacerbation  -Pulmonary follows.   -Currently down to 2L NC from 4L NC. Continue to wean as able.    -On Decadron, continue.   -Continue IBDs.   -Still with some wheeze on exam.      Dizziness / Headache  -Was having persistent severe headache. Does admit to migraine history.   -CTA head and neck was done with no acute findings.  -Gave dose of toradol which seemed to help yesterday, she said was better and now coming back, will give another toradol dose, appreciate neuro recc.    -Awaiting neuro eval.   -Could have some relation to the COVID?  -PT recommending low intensity therapy.        UTI  -UA suggests UTI.   -Urine culture pending with >100,000 gram negative bacilli.   -Continue IV ceftriaxone.      Chronic Back  Pain  -Follows with pain management.   -Continue Lyrica- dose adjusted, she was recently changed to 300 mg BID outpatient, verified on [facility].      Hx CVA  -Continue aspirin and statin.      Anxiety / ADHD  -On Vyvanse at home, we do not carry. Told her to have family bring in to take.   -Continue other home meds.      DVT Risk  -Lovenox subcutaneous.      Plan  Pulmonary follows and Neurology to see.   Continue decadron and IBDs.   Monitor sats, she is down to 2L NC, continue to wean. Still has some wheezing.    Headache improved and returned, will give another 1x dose toradol, appreciate neuro input.   Continue abx for UTI, urine culture growing gram negative bacilli.         Mary Slaughter, APRN-CNP

## 2024-10-19 NOTE — PROGRESS NOTES
Physical Therapy    Physical Therapy Evaluation    Patient Name: Mona Sanders  MRN: [id number]  Department: [address]  Room: [address] [place] [address]  Today's Date: 10/19/2024   Time Calculation  Start Time: 0734  Stop Time: 0751  Time Calculation (min): 17 min    Assessment/Plan   PT Assessment  PT Assessment Results: Decreased strength, Decreased endurance, Impaired balance, Decreased mobility, Decreased safety awareness, Impaired judgement  Rehab Prognosis: Good  Barriers to Discharge: medical managment  Evaluation/Treatment Tolerance: Patient limited by fatigue  Medical Staff Made Aware: Yes  End of Session Communication: Bedside nurse  End of Session Patient Position: Bed, 2 rail up, Alarm on      Assessment Comment: 58 y/o female who presented to ED with dizziness. Found to be hypoxic and requiring O2. Admitted for further management. Covid positive. Pt reportedly indep at baseline. Has visual impairments at baseline that may cause imblanace and dizziness. Pt is currently limited in mobility primarily due to decreased activity tolerance and observed SOB with activity . Would benefit from cont PT services while in-house to maximize safe moibility and promote endurance training.        IP OR SWING BED PT PLAN  Inpatient or Swing Bed: Inpatient  PT Plan  Treatment/Interventions: Transfer training, Gait training, Stair training, Balance training, Neuromuscular re-education, Endurance training, Therapeutic activity  PT Plan: Ongoing PT  PT Frequency: 2 times per week  PT Discharge Recommendations: Low intensity level of continued care  PT Recommended Transfer Status: Assist x1  PT - OK to Discharge: Yes    Subjective   General Visit Information:  General  Reason for Referral: impaired mobility; off balance and dizzienss, COVID  Past Medical History Relevant to Rehab: migraines, COPD, anxiety, HLD  Missed Visit: No  Family/Caregiver Present: No  Prior to Session Communication: Bedside nurse  Patient Position Received: Bed, 3  "rail up, Alarm on  Preferred Learning Style: verbal, visual  General Comment: 60 y/o female who presented to ED wtih dizziness and off-balance. Found to be hypoxic and covid positive. Pt supine in bed upon arrival. 2 L O2, agreeable to participate. requesting to use the bathroom.  Home Living:  Home Living  Type of Home: House  Lives With:  (father and daughter)  Home Layout: One level, Laundry in basement  Home Access: Stairs to enter with rails  Entrance Stairs-Number of Steps: 3  Bathroom Shower/Tub: Tub/shower unit  Home Living Comments: Lives with father and daughter. First floor set-up. Has laundry in basement; daughter can do.  Prior Level of Function:  Prior Function Per Pt/Caregiver Report  Level of [place]: Independent with ADLs and functional transfers  Receives Help From: Family  ADL Assistance: Independent  Ambulatory Assistance: Independent  Prior Function Comments: Indep with mobility. Does not drive. Reportedly has visual disturbances \"from a stroke\" that requires prism glasses. Pt can not drive at night and is only allowed to drive on \"[address].\" Has difficulty navigating stairs due to depth perception.  Precautions:  Precautions  Medical Precautions: Fall precautions, Infection precautions  Precautions Comment: covid iso      Vital Signs Comment: SpO2 93-94% on 2 L O2, during activity     Objective   Pain:  Pain Assessment  Pain Assessment: 0-10  0-10 (Numeric) Pain Score: 0 - No pain  Cognition:  Cognition  Orientation Level: Oriented X4  Cognition Comments: able to follow commands, slightly decreased situational awareness, impulsive at times.  Impulsive: Mildly    General Assessments:  General Observation  General Observation: pleasant/cooperative       Activity Tolerance  Endurance: Decreased tolerance for upright activites  Activity Tolerance Comments: SOB  Rate of Perceived Exertion (RPE): '    Sensation  Light Touch: No apparent deficits    Strength  Strength Comments: B LEs > 3/5 " observed  Strength  Strength Comments: B LEs > 3/5 observed    Coordination  Coordination Comment: observed tremors; pt reports this is baseline    Postural Control  Posture Comment: rounded shoulders    Static Sitting Balance  Static Sitting-Level of Assistance: Modified independent  Dynamic Sitting Balance  Dynamic Sitting-Comments: completed toileting without LOB or supervision. Pt requested therapist stand outside the bathroom door.    Static Standing Balance  Static Standing-Level of Assistance: Close supervision  Dynamic Standing Balance  Dynamic Standing-Comments: no major LOB. Occasionally reaches for external support during ambulation. OBserved tremors and shaking.  Functional Assessments:  Bed Mobility  Bed Mobility: Yes  Bed Mobility 1  Bed Mobility 1: Supine to sitting, Sitting to supine  Level of Assistance 1: Modified independent    Transfers  Transfer: Yes  Transfer 1  Technique 1: Sit to stand, Stand to sit  Transfer Level of Assistance 1: Distant supervision  Trials/Comments 1: elevated EOB  Transfers 2  Transfer From 2:  (toilet transfer)  Technique 2: Sit to stand, Stand to sit  Transfer Level of Assistance 2: Distant supervision  Trials/Comments 2: on/off low toilet with grab bar    Ambulation/Gait Training  Ambulation/Gait Training Performed: Yes  Ambulation/Gait Training 1  Surface 1: Level tile  Device 1: No device  Assistance 1: Close supervision  Quality of Gait 1:  (decreased tu, reaching for external support at times, fidgety with O2 line management.)  Comments/Distance (ft) 1: about 10 ft x 2 to/from bathroom  Extremity/Trunk Assessments:  RLE   RLE : Within Functional Limits  LLE   LLE : Within Functional Limits  Outcome Measures:  [facility] Basic Mobility  Turning from your back to your side while in a flat bed without using bedrails: None  Moving from lying on your back to sitting on the side of a flat bed without using bedrails: None  Moving to and from bed to chair (including a  wheelchair): None  Standing up from a chair using your arms (e.g. wheelchair or bedside chair): A little  To walk in hospital room: A little  Climbing 3-5 steps with railing: A little  Basic Mobility - Total Score: 21    Encounter Problems       Encounter Problems (Active)       Balance       dynamic (Progressing)       Start:  10/19/24    Expected End:  11/02/24       Pt will perform Tinetti assessment and score >/= 24/28, resulting in a low falls risk             Mobility       LTG - Patient will navigate 4-6 steps with rails/device (Progressing)       Start:  10/19/24    Expected End:  11/02/24            ambulation (Progressing)       Start:  10/19/24    Expected End:  11/02/24       Pt will amb > 200  ft with LRAD and mod independence          endurance (Progressing)       Start:  10/19/24    Expected End:  11/02/24       Pt will tolerate 6MWT with MARCIN rating 12-16            Pain - Adult          Safety       LTG - Patient will utilize safety techniques (Progressing)       Start:  10/19/24    Expected End:  11/02/24                   Education Documentation  Precautions, taught by Venecia Nolen PT at 10/19/2024  9:13 AM.  Learner: Patient  Readiness: Acceptance  Method: Explanation  Response: Verbalizes Understanding    Mobility Training, taught by Venecia Nolen PT at 10/19/2024  9:13 AM.  Learner: Patient  Readiness: Acceptance  Method: Explanation  Response: Verbalizes Understanding    Education Comments  No comments found.

## 2024-10-19 NOTE — CONSULTS
Inpatient consult to Neurology  Consult performed by: Aubree Boyd MD  Consult ordered by: WILLEM Cruz-CNP          History Of Present Illness  Mona Sanders is a 59 y.o. female presenting with severe headache in the setting of Covid+ infection.  Pt reports she was fine until Thursday when she started experiencing a severe headache which grew over hours.  She finally came in when the pain became an intense sense of pressure at the top of her head.  Prior to my arrival to evaluate her today she had been given a dose of Toradol which she states has helped significantly.  She continues to have a mild headache but that is much more tolerable at this time.  No other focal neurologic deficits.  She does report having had a stroke in the past and noticed recurrence of the diplopia and general recrudescence of her old stroke symptoms but even that has since improved with management of her headache.     Past Medical History  She has a past medical history of Acute exacerbation of chronic obstructive pulmonary disease (Multi) (03/21/2024), Anxiety, Colon cancer screening (07/30/2024), Depression, FHx: atrial fibrillation, History of anemia, HLD (hyperlipidemia), Hypertension, Incontinence, Ingrown toenail of left foot, Iron deficiency anemia, Nephrolithiasis, Nicotine dependence, OAB (overactive bladder), Opiate dependence, Pain in left hip (04/13/2022), Right knee pain (06/02/2023), Rotator cuff injury, left, sequela, SOB (shortness of breath), Spondylosis of cervical spine, Spondylosis of lumbar spine, Stroke (cerebrum), Transient cerebral ischemic attack (10/14/2021), and Tubal pregnancy ([facility]).    Surgical History  She has a past surgical history that includes Hip Arthroplasty (08/31/2020); MR angio head wo IV contrast (06/02/2020); MR angio neck wo IV contrast (06/02/2020); CT angio neck (10/14/2021); CT angio head w and wo IV contrast (10/14/2021); MR angio head wo IV contrast (08/10/2022); MR  angio neck wo IV contrast (08/10/2022); MR angio head wo IV contrast (06/24/2019); Ureterolithotomy; and Ectopic pregnancy surgery.     Social History  She reports that she has been smoking cigarettes. She has never used smokeless tobacco. She reports that she does not drink alcohol and does not use drugs.     Allergies  Tramadol, Bupropion, and Wellbutrin [bupropion hcl]    Medications  Facility-Administered Medications Prior to Admission   Medication Dose Route Frequency Provider Last Rate Last Admin    lidocaine (Xylocaine) 10 mg/mL (1 %) injection 50 mg  5 mL infiltration Once Samantha Chacon MD        lidocaine 2% jelly 1 Application  1 Application urethral Once Samantha Chacon MD        nitrofurantoin (macrocrystal-monohydrate) (Macrobid) capsule 100 mg  100 mg oral Once Samantha Chacon MD        phenazopyridine (Pyridium) tablet 200 mg  200 mg oral Once Samantha Chacon MD        sodium chloride (PF) 0.9% solution 10 mL  10 mL intra-catheter Once Samantah Chacon MD         Medications Prior to Admission   Medication Sig Dispense Refill Last Dose/Taking    albuterol (Ventolin HFA) 90 mcg/actuation inhaler Inhale 1 to 2 puffs by mouth and into the lungs every 4 to 6 hours if needed. 18 g 3     aspirin 81 mg EC tablet Take 1 tablet (81 mg) by mouth once daily.       atorvastatin (Lipitor) 40 mg tablet take 1 tablet by mouth once daily 180 tablet 0     budesonide-formoteroL (Symbicort) 80-4.5 mcg/actuation inhaler Inhale 2 puffs 2 times a day. Rinse mouth with water after use to reduce aftertaste and incidence of candidiasis. Do not swallow. 10.2 g 5     DULoxetine (Cymbalta) 30 mg DR capsule Take 1 capsule (30 mg) by mouth once daily. Do not crush or chew.       fluticasone (Flonase) 50 mcg/actuation nasal spray Administer 2 sprays into each nostril once daily. (Patient not taking: Reported on 7/30/2024) 16 g 11     hydrOXYzine HCL (Atarax) 50 mg tablet Take 1 tablet (50 mg) by mouth 2 times a  "day.       nitrofurantoin, macrocrystal-monohydrate, (Macrobid) 100 mg capsule Take 1 capsule (100 mg) by mouth 2 times a day.       pregabalin (Lyrica) 150 mg capsule Take 1 capsule (150 mg) by mouth 3 times a day. 90 capsule 0     Vyvanse 40 mg capsule Take 1 capsule (40 mg) by mouth once daily in the morning. Take before meals.        Review of Systems    Scheduled medications  aspirin, 81 mg, oral, Daily  atorvastatin, 40 mg, oral, Nightly  cefTRIAXone, 1 g, intravenous, q24h  dexAMETHasone, 6 mg, oral, Daily with breakfast  DULoxetine, 30 mg, oral, Daily  enoxaparin, 40 mg, subcutaneous, Daily  hydrOXYzine HCL, 50 mg, oral, BID  ipratropium-albuteroL, 3 mL, nebulization, TID  nicotine, 1 patch, transdermal, Daily   Followed by  [START ON 11/29/2024] nicotine, 1 patch, transdermal, Daily   Followed by  [START ON 12/13/2024] nicotine, 1 patch, transdermal, Daily  oxygen, , inhalation, q8h  polyethylene glycol, 17 g, oral, Daily  pregabalin, 300 mg, oral, BID      Continuous medications     PRN medications  PRN medications: acetaminophen **OR** acetaminophen **OR** acetaminophen, ondansetron ODT **OR** ondansetron    Last Recorded Vitals   Blood pressure 104/65, pulse 79, temperature 36.9 °C (98.4 °F), temperature source Temporal, resp. rate 17, height 1.676 m (5' 6\"), weight 77.6 kg (171 lb), SpO2 95%.    Heart is RRR, Lungs CTAB  Neurologically, pt is awake and oriented x4. Attention, concentration, memory, cortical processing are intact. No aphasia  Cranial nerves: VFF, EOMI, No nystagmus, Face is symmetric to sensory and motor, no dysarthria, Tongue protrudes midline, Shrug symmetric  Motor: 5/5 strength B throughout, no tremor or asterixis  Sensation is intact bilaterally throughout  Coordination: no ataxia, no dysmetria/dysdiadochokinesia  DTR symmetric  Gait stable, normal    Relevant Results    Results for orders placed or performed during the hospital encounter of 10/17/24 (from the past 96 hours)   POCT " GLUCOSE   Result Value Ref Range    POCT Glucose 121 (H) 74 - 99 mg/dL   Magnesium   Result Value Ref Range    Magnesium 1.70 1.60 - 2.40 mg/dL   CBC and Auto Differential   Result Value Ref Range    WBC 6.7 4.4 - 11.3 x10*3/uL    nRBC 0.0 0.0 - 0.0 /100 WBCs    RBC 5.18 4.00 - 5.20 x10*6/uL    Hemoglobin 13.8 12.0 - 16.0 g/dL    Hematocrit 42.5 36.0 - 46.0 %    MCV 82 80 - 100 fL    MCH 26.6 26.0 - 34.0 pg    MCHC 32.5 32.0 - 36.0 g/dL    RDW 15.9 (H) 11.5 - 14.5 %    Platelets 201 150 - 450 x10*3/uL    Neutrophils % 69.3 40.0 - 80.0 %    Immature Granulocytes %, Automated 0.4 0.0 - 0.9 %    Lymphocytes % 16.6 13.0 - 44.0 %    Monocytes % 13.3 2.0 - 10.0 %    Eosinophils % 0.0 0.0 - 6.0 %    Basophils % 0.4 0.0 - 2.0 %    Neutrophils Absolute 4.64 1.20 - 7.70 x10*3/uL    Immature Granulocytes Absolute, Automated 0.03 0.00 - 0.70 x10*3/uL    Lymphocytes Absolute 1.11 (L) 1.20 - 4.80 x10*3/uL    Monocytes Absolute 0.89 0.10 - 1.00 x10*3/uL    Eosinophils Absolute 0.00 0.00 - 0.70 x10*3/uL    Basophils Absolute 0.03 0.00 - 0.10 x10*3/uL   Comprehensive metabolic panel   Result Value Ref Range    Glucose 102 (H) 74 - 99 mg/dL    Sodium 137 136 - 145 mmol/L    Potassium 3.5 3.5 - 5.3 mmol/L    Chloride 100 98 - 107 mmol/L    Bicarbonate 26 21 - 32 mmol/L    Anion Gap 15 10 - 20 mmol/L    Urea Nitrogen 7 6 - 23 mg/dL    Creatinine 0.88 0.50 - 1.05 mg/dL    eGFR 75 >60 mL/min/1.73m*2    Calcium 9.0 8.6 - 10.3 mg/dL    Albumin 4.0 3.4 - 5.0 g/dL    Alkaline Phosphatase 91 33 - 136 U/L    Total Protein 6.5 6.4 - 8.2 g/dL    AST 28 9 - 39 U/L    Bilirubin, Total 0.3 0.0 - 1.2 mg/dL    ALT 22 7 - 45 U/L   Blood Gas Venous   Result Value Ref Range    POCT pH, Venous 7.40 7.33 - 7.43 pH    POCT pCO2, Venous 48 41 - 51 mm Hg    POCT pO2, Venous 30 (L) 35 - 45 mm Hg    POCT SO2, Venous 38 (L) 45 - 75 %    POCT Oxy Hemoglobin, Venous 36.7 (L) 45.0 - 75.0 %    POCT Base Excess, Venous 4.0 (H) -2.0 - 3.0 mmol/L    POCT HCO3  Calculated, Venous 29.7 (H) 22.0 - 26.0 mmol/L    Patient Temperature 37.0 degrees Celsius    FiO2 95 %   Urinalysis with Reflex Culture and Microscopic   Result Value Ref Range    Color, Urine Light-Yellow Light-Yellow, Yellow, Dark-Yellow    Appearance, Urine Turbid (N) Clear    Specific Gravity, Urine 1.010 1.005 - 1.035    pH, Urine 5.5 5.0, 5.5, 6.0, 6.5, 7.0, 7.5, 8.0    Protein, Urine 10 (TRACE) NEGATIVE, 10 (TRACE), 20 (TRACE) mg/dL    Glucose, Urine Normal Normal mg/dL    Blood, Urine NEGATIVE NEGATIVE    Ketones, Urine NEGATIVE NEGATIVE mg/dL    Bilirubin, Urine NEGATIVE NEGATIVE    Urobilinogen, Urine Normal Normal mg/dL    Nitrite, Urine NEGATIVE NEGATIVE    Leukocyte Esterase, Urine 500 Rosa/µL (A) NEGATIVE   Microscopic Only, Urine   Result Value Ref Range    WBC, Urine >50 (A) 1-5, NONE /HPF    WBC Clumps, Urine OCCASIONAL Reference range not established. /HPF    RBC, Urine 1-2 NONE, 1-2, 3-5 /HPF    Squamous Epithelial Cells, Urine 1-9 (SPARSE) Reference range not established. /HPF    Bacteria, Urine 1+ (A) NONE SEEN /HPF    Mucus, Urine FEW Reference range not established. /LPF   Urine Culture    Specimen: Clean Catch/Voided; Urine   Result Value Ref Range    Urine Culture >100,000 Gram Negative Bacilli (A)    ECG 12 lead   Result Value Ref Range    Ventricular Rate 87 BPM    Atrial Rate 87 BPM    FL Interval 190 ms    QRS Duration 92 ms    QT Interval 356 ms    QTC Calculation(Bazett) 428 ms    P Axis 68 degrees    R Axis 68 degrees    T Axis 69 degrees    QRS Count 15 beats    Q Onset 226 ms    P Onset 131 ms    P Offset 188 ms    T Offset 404 ms    QTC Fredericia 402 ms   POCT GLUCOSE   Result Value Ref Range    POCT Glucose 89 74 - 99 mg/dL   Sars-CoV-2 PCR   Result Value Ref Range    Coronavirus 2019, PCR Detected (A) Not Detected   Influenza A, and B PCR   Result Value Ref Range    Flu A Result Not Detected Not Detected    Flu B Result Not Detected Not Detected   CBC   Result Value Ref Range     WBC 5.2 4.4 - 11.3 x10*3/uL    nRBC 0.0 0.0 - 0.0 /100 WBCs    RBC 4.68 4.00 - 5.20 x10*6/uL    Hemoglobin 12.6 12.0 - 16.0 g/dL    Hematocrit 38.4 36.0 - 46.0 %    MCV 82 80 - 100 fL    MCH 26.9 26.0 - 34.0 pg    MCHC 32.8 32.0 - 36.0 g/dL    RDW 15.9 (H) 11.5 - 14.5 %    Platelets 170 150 - 450 x10*3/uL   Basic metabolic panel   Result Value Ref Range    Glucose 92 74 - 99 mg/dL    Sodium 139 136 - 145 mmol/L    Potassium 3.2 (L) 3.5 - 5.3 mmol/L    Chloride 104 98 - 107 mmol/L    Bicarbonate 24 21 - 32 mmol/L    Anion Gap 14 10 - 20 mmol/L    Urea Nitrogen 6 6 - 23 mg/dL    Creatinine 0.75 0.50 - 1.05 mg/dL    eGFR >90 >60 mL/min/1.73m*2    Calcium 8.4 (L) 8.6 - 10.3 mg/dL   Procalcitonin   Result Value Ref Range    Procalcitonin 0.02 <=0.07 ng/mL   Basic Metabolic Panel   Result Value Ref Range    Glucose 126 (H) 74 - 99 mg/dL    Sodium 139 136 - 145 mmol/L    Potassium 4.6 3.5 - 5.3 mmol/L    Chloride 108 (H) 98 - 107 mmol/L    Bicarbonate 25 21 - 32 mmol/L    Anion Gap 11 10 - 20 mmol/L    Urea Nitrogen 12 6 - 23 mg/dL    Creatinine 0.69 0.50 - 1.05 mg/dL    eGFR >90 >60 mL/min/1.73m*2    Calcium 9.2 8.6 - 10.3 mg/dL   CBC   Result Value Ref Range    WBC 3.5 (L) 4.4 - 11.3 x10*3/uL    nRBC 0.0 0.0 - 0.0 /100 WBCs    RBC 4.90 4.00 - 5.20 x10*6/uL    Hemoglobin 13.1 12.0 - 16.0 g/dL    Hematocrit 40.3 36.0 - 46.0 %    MCV 82 80 - 100 fL    MCH 26.7 26.0 - 34.0 pg    MCHC 32.5 32.0 - 36.0 g/dL    RDW 15.6 (H) 11.5 - 14.5 %    Platelets 197 150 - 450 x10*3/uL        ECG 12 lead    Result Date: 10/18/2024  Normal sinus rhythm Normal ECG When compared with ECG of 08-DEC-2023 13:44, No significant change was found    XR chest 2 views    Result Date: 10/17/2024  Interpreted By:  Amrik Boyd, STUDY: XR CHEST 2 VIEWS;  10/17/2024 11:23 pm   INDICATION: Signs/Symptoms:hypoxia.     COMPARISON: 08/10/2022   ACCESSION NUMBER(S): [id number]   ORDERING CLINICIAN: SAHIL NIELSON   FINDINGS: PA and lateral radiographs of  the chest were provided.       CARDIOMEDIASTINAL SILHOUETTE: Cardiomediastinal silhouette is normal in size and configuration.   LUNGS: Lungs are clear.   ABDOMEN: No remarkable upper abdominal findings.   BONES: No acute osseous changes.       1.  No evidence of acute cardiopulmonary process.       MACRO: None   Signed by: Amrik Boyd 10/17/2024 11:27 PM Dictation workstation:   [id number]    CT angio head and neck w and wo IV contrast    Result Date: 10/17/2024  Interpreted By:  Miko Aguila, STUDY: CT ANGIO HEAD AND NECK W AND WO IV CONTRAST;  10/17/2024 9:29 pm   INDICATION: Signs/Symptoms:dizziness, headache,.   COMPARISON: MRI of the brain and MRA of the head and neck dated 08/10/2022   ACCESSION NUMBER(S): [id number]   ORDERING CLINICIAN: SAHIL NIELSON   TECHNIQUE: Unenhanced CT images of the head were obtained. Subsequently,   was administered intravenously and axial images of the head and neck were acquired.  Coronal, sagittal, and 3-D reconstructions were provided for review.   FINDINGS: Noncontrast axial CT images of the head do not demonstrate any evidence of hyperdense intracranial hemorrhage. There is no mass effect or midline shift.   Geographic area of cystic encephalomalacia and gliosis is present in the right frontal lobe, likely representing sequela of prior infarct/injury, similar to prior exam in August of 2022. Subtle attenuation changes present in the bilateral cerebral hemispheres likely represent sequela of microvascular disease.   No abnormal ventricular dilatation is present. Basal cisterns are patent. No extra-axial fluid collections are identified.   Scalp soft tissues do not demonstrate any acute abnormality. No acute calvarial abnormality is evident. Mastoid air cells and middle ear cavities are clear.   Visualized paranasal sinuses are unremarkable in appearance.   CTA HEAD FINDINGS:   CT images of the head is somewhat degraded by motion.   Intracranial carotid  arteries demonstrate symmetric opacification bilaterally without evidence of occlusion or high-grade stenosis. Carotid terminals are symmetric in appearance.   The anterior cerebral arteries are supplied predominantly by the right A1 with aplastic/hypoplastic left A1. More distally the anterior cerebral arteries demonstrate preserved opacification without evidence of occlusion.   Middle cerebral arteries are symmetric in appearance without evidence of high-grade stenosis in the M1 segments or focal vessel occlusion in the more distal cortical M2 and M3 branches.   Intracranial vertebral arteries demonstrate anatomic variant dominant right vertebral artery without evidence of occlusion. Basilar artery slightly diminutive in appearance without evidence of occlusion.   The posterior cerebral arteries receive code dominant supply from the posterior communicating arteries bilaterally and demonstrate symmetric opacification distally without evidence of occlusion.   No enhancing masses or vascular malformations are identified. No thrombus is identified in the opacified dural venous sinuses.   CTA NECK FINDINGS:   Evaluation of the upper chest and lower neck is degraded by combination of motion and beam hardening artifact from the contrast bolus in the left subclavian vein.   Within limitations of the study a 4 vessel aortic arch is present, with direct origin of the left vertebral artery from the aortic arch. Minimal atherosclerotic changes are present to the arch in the origin of the great vessels without significant stenosis.   Common carotid arteries demonstrate symmetric opacification bilaterally without evidence of occlusion or stenosis. There is somewhat retropharyngeal course of the right common carotid artery and proximal right extracranial internal carotid artery.   Extracranial internal carotid arteries demonstrate symmetric opacification without evidence of occlusion or significant stenosis by NASCET criteria.    Extracranial vertebral arteries demonstrate symmetric opacification without evidence of occlusion or stenosis. The right vertebral artery originates from the right subclavian artery.   Soft tissues of the neck and skull base do not demonstrate any acute abnormality.   Multilevel degenerative changes are present in the cervical spine without evidence of compression fracture or high-grade stenosis.       1. Noncontrast CT images of the head do not demonstrate any evidence of hemorrhage, CT apparent transcortical infarct, or other emergent intracranial abnormality. 2. No large vessel occlusion is identified in the proximal intracranial circulation. No significant stenosis is present in the large arteries of the neck. 3. Geographic area of cystic encephalomalacia and gliosis in the right frontal lobe likely represent sequela of prior infarct/injury, similar in appearance to prior exam in August of 2022. Subtle attenuation changes in the bilateral cerebral hemispheric white matter likely represent sequela of microvascular disease.   MACRO: None   Signed by: Miko Aguila 10/17/2024 10:11 PM Dictation workstation:   [id number]        Assessment/Plan   Principal Problem:    Chronic obstructive pulmonary disease with acute exacerbation (Multi)  Active Problems:    Migraine without status migrainosus, not intractable, unspecified migraine type    Acute cystitis without hematuria    Hypoxia    59-year-old woman with remote history of stroke is neurologically stable at this time.  Her severe headache due to the inflammation of her COVID-19 infection seems to be adequately managed with use of NSAIDs, likely due to their anti-inflammatory feature.  Continued use of Toradol or naproxen is recommended.  She will likely need follow-up as an outpatient if she does not have a primary neurologist already.    I personally spent 50 minutes today, exclusive of procedures, providing care for this patient, including preparation,  face to face time, documentation and other services such as review of medical records, diagnostic result, patient education, counseling, coordination of care as specified in the encounter.

## 2024-10-19 NOTE — PROGRESS NOTES
Evaluation/Treatment    Patient Name: Mona Sanders  MRN: [id number]  Department: [address]  Room: [address] [place] [address]  Today's Date: 10/19/24  Time Calculation  Start Time: 0829  Stop Time: 0855  Time Calculation (min): 26 min       Assessment:  OT Assessment: 58 y/o female who presented to ED [facility] dizziness and off-balance and found to be COVID positive presents at baseline functional status despite SOB with activity.  No further skilled OT needs identified at this time.  Will sign off.  Barriers to Discharge: None  End of Session Patient Position: Bed, 3 rail up, Alarm on  Barriers to Discharge: None    Plan:  Treatment Interventions: ADL retraining, Functional transfer training, Patient/family training, Compensatory technique education  OT Discharge Recommendations: No further acute OT  OT Recommended Transfer Status: Independent  OT - OK to Discharge: Yes  Treatment Interventions: ADL retraining, Functional transfer training, Patient/family training, Compensatory technique education        Subjective   Current Problem:  1. Migraine without status migrainosus, not intractable, unspecified migraine type        2. Dizziness  meclizine (Antivert) 25 mg tablet    Referral to Physical Therapy      3. Tachycardia        4. Imbalance due to old stroke  meclizine (Antivert) 25 mg tablet      5. Hypoxia        6. COPD exacerbation (Multi)        7. COVID          General:   OT Received On: 10/19/24  General  Reason for Referral: Impaired ADLs r/t COPD  Referred By: Fernando Slaughter CNP  Past Medical History Relevant to Rehab: poly-substance abuse, migraines, COPD, CVA, OA, HTN, a-fib, anxiety, depression  Family/Caregiver Present: No  Prior to Session Communication: Bedside nurse  Patient Position Received: Bed, 3 rail up, Alarm on  Preferred Learning Style: verbal, visual  General Comment: 58 yo female admitted with COVID was cleared by nursing for therapy and agreeable to same.    Precautions:  Hearing/Visual Limitations: residual  deficits s/p CVA  Medical Precautions: Fall precautions, Infection precautions, Oxygen therapy device and L/min (2L via NC)  Precautions Comment: +COVID    Vital Sign (Past 2hrs)        Date/Time Vitals Session Patient Position Pulse Resp SpO2 BP MAP (mmHg)    10/19/24 0829 --  --  --  --  94 %  --  --                 Pain:  Pain Assessment  Pain Assessment: 0-10  0-10 (Numeric) Pain Score: 8  Pain Type: Chronic pain  Pain Location: Hip  Pain Orientation: Right, Left  Pain Interventions: Repositioned, Other (Comment) (Notified nursing of request for pain meds)        Objective   Cognition:  Overall Cognitive Status: Within Functional Limits  Orientation Level: Oriented X4  Impulsive: Mildly    Home Living:  Type of Home: House  Lives With: Other (Comment) (Father, daughter (mom passed away ~2 months ago))  Home Adaptive Equipment: None  Home Layout: Multi-level, Able to live on main level with bedroom/bathroom, Laundry in basement  Home Access: Stairs to enter with rails  Entrance Stairs-Rails: Both  Entrance Stairs-Number of Steps: 3  Bathroom Shower/Tub: Tub/shower unit  Bathroom Toilet: Standard  Bathroom Equipment: None  Home Living Comments: Pt does not need to access the basment    Prior Function:  Level of [place]: Independent with ADLs and functional transfers, Independent with homemaking with ambulation  Receives Help From: Other (Comment) (Shares IADLs with family)  ADL Assistance: Independent  Homemaking Assistance: Independent  Ambulatory Assistance: Independent  Vocational: Part time employment ([employer])  Prior Function Comments: Restricted driving privileges d/t visual disturbance s/p CVA (wears prism glasses)    ADL:  Eating Assistance: Independent  Eating Deficit:  (per clinical judgment)  Grooming Assistance: Independent  Grooming Deficit:  (per clinical jugement)  Bathing Assistance: Independent  Bathing Deficit:  (sponge-bathing sitting edge of bed)  UE Dressing Assistance: Independent  UE  Dressing Deficit:  (donning/doffing a pullover shirt)  LE Dressing Assistance: Independent  LE Dressing Deficit:  (donning/doffing pants and socks)  Toileting Assistance with Device: Independent  Toileting Deficit:  (per care team)    Activity Tolerance:  Endurance: Decreased tolerance for upright activites    Bed Mobility/Transfers: Bed Mobility 1  Bed Mobility 1: Supine to sitting, Sitting to supine  Level of Assistance 1: Modified independent  Bed Mobility Comments 1: toward the right side of bed with head elevated ~40 degrees  Bed Mobility 2  Bed Mobility  2: Sitting to supine  Level of Assistance 2: Independent    Transfer 1  Transfer From 1: Bed to  Transfer to 1: Stand  Technique 1: Sit to stand, Stand to sit  Transfer Level of Assistance 1: Independent  Trials/Comments 1: multiple times    Sensation:  Light Touch: No apparent deficits  Sensation Comment: Denies tingling/numbness    Strength:  Strength Comments: BUEs=~4/5 grossly    Hand Function:  Hand Function  Gross Grasp: Functional  Coordination: Functional    Extremities: RUE   RUE : Within Functional Limits and LUE   LUE: Within Functional Limits    Outcome Measures: [facility] Daily Activity  Putting on and taking off regular lower body clothing: None  Bathing (including washing, rinsing, drying): None  Putting on and taking off regular upper body clothing: None  Toileting, which includes using toilet, bedpan or urinal: None  Taking care of personal grooming such as brushing teeth: None  Eating Meals: None  Daily Activity - Total Score: 24    OP EDUCATION:  Education  Individual(s) Educated: Patient  Education Provided: Other (ADLs utilizing compensatory techniques)  Patient Response to Education: Patient/Caregiver Performed Return Demonstration of Exercises/Activities

## 2024-10-20 VITALS
RESPIRATION RATE: 16 BRPM | SYSTOLIC BLOOD PRESSURE: 151 MMHG | DIASTOLIC BLOOD PRESSURE: 87 MMHG | TEMPERATURE: 97 F | OXYGEN SATURATION: 93 % | BODY MASS INDEX: 27.48 KG/M2 | WEIGHT: 171 LBS | HEIGHT: 66 IN | HEART RATE: 62 BPM

## 2024-10-20 LAB
ANION GAP SERPL CALCULATED.3IONS-SCNC: 13 MMOL/L (ref 10–20)
BACTERIA UR CULT: ABNORMAL
BUN SERPL-MCNC: 13 MG/DL (ref 6–23)
CALCIUM SERPL-MCNC: 9 MG/DL (ref 8.6–10.3)
CHLORIDE SERPL-SCNC: 105 MMOL/L (ref 98–107)
CO2 SERPL-SCNC: 25 MMOL/L (ref 21–32)
CREAT SERPL-MCNC: 0.71 MG/DL (ref 0.5–1.05)
EGFRCR SERPLBLD CKD-EPI 2021: >90 ML/MIN/1.73M*2
ERYTHROCYTE [DISTWIDTH] IN BLOOD BY AUTOMATED COUNT: 15.9 % (ref 11.5–14.5)
GLUCOSE SERPL-MCNC: 144 MG/DL (ref 74–99)
HCT VFR BLD AUTO: 40.5 % (ref 36–46)
HGB BLD-MCNC: 12.9 G/DL (ref 12–16)
MCH RBC QN AUTO: 26.7 PG (ref 26–34)
MCHC RBC AUTO-ENTMCNC: 31.9 G/DL (ref 32–36)
MCV RBC AUTO: 84 FL (ref 80–100)
NRBC BLD-RTO: 0 /100 WBCS (ref 0–0)
PLATELET # BLD AUTO: 200 X10*3/UL (ref 150–450)
POTASSIUM SERPL-SCNC: 4.8 MMOL/L (ref 3.5–5.3)
RBC # BLD AUTO: 4.83 X10*6/UL (ref 4–5.2)
SODIUM SERPL-SCNC: 138 MMOL/L (ref 136–145)
WBC # BLD AUTO: 6.7 X10*3/UL (ref 4.4–11.3)

## 2024-10-20 PROCEDURE — S4991 NICOTINE PATCH NONLEGEND: HCPCS | Performed by: STUDENT IN AN ORGANIZED HEALTH CARE EDUCATION/TRAINING PROGRAM

## 2024-10-20 PROCEDURE — 2500000005 HC RX 250 GENERAL PHARMACY W/O HCPCS: Performed by: STUDENT IN AN ORGANIZED HEALTH CARE EDUCATION/TRAINING PROGRAM

## 2024-10-20 PROCEDURE — 2500000004 HC RX 250 GENERAL PHARMACY W/ HCPCS (ALT 636 FOR OP/ED): Performed by: STUDENT IN AN ORGANIZED HEALTH CARE EDUCATION/TRAINING PROGRAM

## 2024-10-20 PROCEDURE — 2500000002 HC RX 250 W HCPCS SELF ADMINISTERED DRUGS (ALT 637 FOR MEDICARE OP, ALT 636 FOR OP/ED): Performed by: STUDENT IN AN ORGANIZED HEALTH CARE EDUCATION/TRAINING PROGRAM

## 2024-10-20 PROCEDURE — 36415 COLL VENOUS BLD VENIPUNCTURE: CPT | Performed by: NURSE PRACTITIONER

## 2024-10-20 PROCEDURE — 99232 SBSQ HOSP IP/OBS MODERATE 35: CPT | Performed by: NURSE PRACTITIONER

## 2024-10-20 PROCEDURE — 82374 ASSAY BLOOD CARBON DIOXIDE: CPT | Performed by: NURSE PRACTITIONER

## 2024-10-20 PROCEDURE — 9420000001 HC RT PATIENT EDUCATION 5 MIN

## 2024-10-20 PROCEDURE — 2500000001 HC RX 250 WO HCPCS SELF ADMINISTERED DRUGS (ALT 637 FOR MEDICARE OP): Performed by: STUDENT IN AN ORGANIZED HEALTH CARE EDUCATION/TRAINING PROGRAM

## 2024-10-20 PROCEDURE — 85027 COMPLETE CBC AUTOMATED: CPT | Performed by: NURSE PRACTITIONER

## 2024-10-20 PROCEDURE — 94640 AIRWAY INHALATION TREATMENT: CPT

## 2024-10-20 PROCEDURE — 2500000004 HC RX 250 GENERAL PHARMACY W/ HCPCS (ALT 636 FOR OP/ED): Performed by: NURSE PRACTITIONER

## 2024-10-20 PROCEDURE — 1100000001 HC PRIVATE ROOM DAILY

## 2024-10-20 PROCEDURE — 2500000001 HC RX 250 WO HCPCS SELF ADMINISTERED DRUGS (ALT 637 FOR MEDICARE OP): Performed by: NURSE PRACTITIONER

## 2024-10-20 RX ORDER — KETOROLAC TROMETHAMINE 30 MG/ML
15 INJECTION, SOLUTION INTRAMUSCULAR; INTRAVENOUS EVERY 8 HOURS PRN
Status: DISCONTINUED | OUTPATIENT
Start: 2024-10-20 | End: 2024-10-21 | Stop reason: HOSPADM

## 2024-10-20 RX ORDER — PANTOPRAZOLE SODIUM 40 MG/1
40 TABLET, DELAYED RELEASE ORAL
Status: DISCONTINUED | OUTPATIENT
Start: 2024-10-20 | End: 2024-10-21 | Stop reason: HOSPADM

## 2024-10-20 RX ORDER — ACETAMINOPHEN 500 MG
5 TABLET ORAL NIGHTLY PRN
Status: DISCONTINUED | OUTPATIENT
Start: 2024-10-20 | End: 2024-10-21 | Stop reason: HOSPADM

## 2024-10-20 ASSESSMENT — PAIN SCALES - GENERAL
PAINLEVEL_OUTOF10: 8
PAINLEVEL_OUTOF10: 2
PAINLEVEL_OUTOF10: 7
PAINLEVEL_OUTOF10: 8
PAINLEVEL_OUTOF10: 2
PAINLEVEL_OUTOF10: 7
PAINLEVEL_OUTOF10: 0 - NO PAIN
PAINLEVEL_OUTOF10: 8
PAINLEVEL_OUTOF10: 0 - NO PAIN

## 2024-10-20 ASSESSMENT — PAIN DESCRIPTION - LOCATION
LOCATION: OTHER (COMMENT)
LOCATION: HEAD
LOCATION: HEAD

## 2024-10-20 NOTE — PROGRESS NOTES
Mona Sanders is a 59 y.o. female on day 3 of admission presenting with Chronic obstructive pulmonary disease with acute exacerbation (Multi).      Subjective   Patient seen and examined. Resting in bed. States she feels a little better today, still with mild headache. Still with some mild SOB and wheeze. Afebrile.        Objective     Last Recorded Vitals  /87 (BP Location: Left arm, Patient Position: Lying)   Pulse 62   Temp 36.1 °C (97 °F) (Temporal)   Resp 16   Wt 77.6 kg (171 lb)   SpO2 97%   Intake/Output last 3 Shifts:    Intake/Output Summary (Last 24 hours) at 10/20/2024 1042  Last data filed at 10/20/2024 0945  Gross per 24 hour   Intake 300 ml   Output 800 ml   Net -500 ml       Admission Weight  Weight: 77.6 kg (171 lb 1.2 oz) (10/17/24 1904)    Daily Weight  10/17/24 : 77.6 kg (171 lb)    Image Results    No new imaging to review.     Physical Exam    General: Alert and oriented x3, pleasant.   Cardiac: Regular rate and rhythm, S1/S2 , no murmur.   Pulmonary: Diminished with some diffuse exp wheeze on 2L NC.   Abdomen: Soft, round, nontender. BS +x4.   Extremities: No edema. Equal strength in all extremities.   Skin: No rashes or lesions.    Relevant Results    Scheduled medications  aspirin, 81 mg, oral, Daily  atorvastatin, 40 mg, oral, Nightly  cefTRIAXone, 1 g, intravenous, q24h  dexAMETHasone, 6 mg, oral, Daily with breakfast  DULoxetine, 30 mg, oral, Daily  enoxaparin, 40 mg, subcutaneous, Daily  hydrOXYzine HCL, 50 mg, oral, BID  ipratropium-albuteroL, 3 mL, nebulization, TID  nicotine, 1 patch, transdermal, Daily   Followed by  [START ON 11/29/2024] nicotine, 1 patch, transdermal, Daily   Followed by  [START ON 12/13/2024] nicotine, 1 patch, transdermal, Daily  oxygen, , inhalation, q8h  polyethylene glycol, 17 g, oral, Daily  pregabalin, 300 mg, oral, BID      Continuous medications     PRN medications  PRN medications: acetaminophen **OR** acetaminophen **OR** acetaminophen,  ketorolac, ondansetron ODT **OR** ondansetron     Results for orders placed or performed during the hospital encounter of 10/17/24 (from the past 24 hours)   CBC   Result Value Ref Range    WBC 6.7 4.4 - 11.3 x10*3/uL    nRBC 0.0 0.0 - 0.0 /100 WBCs    RBC 4.83 4.00 - 5.20 x10*6/uL    Hemoglobin 12.9 12.0 - 16.0 g/dL    Hematocrit 40.5 36.0 - 46.0 %    MCV 84 80 - 100 fL    MCH 26.7 26.0 - 34.0 pg    MCHC 31.9 (L) 32.0 - 36.0 g/dL    RDW 15.9 (H) 11.5 - 14.5 %    Platelets 200 150 - 450 x10*3/uL   Basic Metabolic Panel   Result Value Ref Range    Glucose 144 (H) 74 - 99 mg/dL    Sodium 138 136 - 145 mmol/L    Potassium 4.8 3.5 - 5.3 mmol/L    Chloride 105 98 - 107 mmol/L    Bicarbonate 25 21 - 32 mmol/L    Anion Gap 13 10 - 20 mmol/L    Urea Nitrogen 13 6 - 23 mg/dL    Creatinine 0.71 0.50 - 1.05 mg/dL    eGFR >90 >60 mL/min/1.73m*2    Calcium 9.0 8.6 - 10.3 mg/dL             Assessment/Plan      COVID 19  -Isolation precautions.   -On decadron as she was hypoxic. Continues to require supplemental O2 but improving.    -CXR with no acute abnormalities.   -Supportive care.      COPD Exacerbation  -Pulmonary follows.   -Currently on 2L NC. Continue to wean as able. Trial room air today.   -On Decadron, continue.   -Continue IBDs.   -Still with some wheeze on exam.      Dizziness / Headache  -Was having persistent severe headache. Does admit to migraine history.   -CTA head and neck was done with no acute findings.  -Continue PRN toradol and tylenol.   -Neuro saw and feel this is all inflammation from COVID.   -PT recommending low intensity therapy.        UTI  -UA suggests UTI.   -Urine culture finalized e.coli- pansensitive.  -Continue IV ceftriaxone.      Chronic Back Pain  -Follows with pain management.   -Continue Lyrica- dose adjusted, she was recently changed to 300 mg BID outpatient, verified on [facility].      Hx CVA  -Continue aspirin and statin.      Anxiety / ADHD  -On Vyvanse at home, we do not carry. Told her  to have family bring in to take.   -Continue other home meds.      DVT Risk  -Lovenox subcutaneous.      Plan  Pulmonary follows. Neuro saw and signed off.   Continue decadron and IBDs.   Monitor sats, she reamins on 2L NC, continue to wean. Still has some wheezing. Trial room air today.   Headache improved and returned, neuro recommends to continue tylenol and toradol.   Continue abx for UTI.  Possible DC tomorrow if she continues to improve.         Mary Slaughter, APRN-CNP

## 2024-10-20 NOTE — CARE PLAN
Problem: Pain - Adult  Goal: Verbalizes/displays adequate comfort level or baseline comfort level  Outcome: Progressing     Problem: Safety - Adult  Goal: Free from fall injury  Outcome: Progressing     Problem: Discharge Planning  Goal: Discharge to home or other facility with appropriate resources  Outcome: Progressing     Problem: Chronic Conditions and Co-morbidities  Goal: Patient's chronic conditions and co-morbidity symptoms are monitored and maintained or improved  Outcome: Progressing     Problem: Pain  Goal: Takes deep breaths with improved pain control throughout the shift  Outcome: Progressing  Goal: Turns in bed with improved pain control throughout the shift  Outcome: Progressing  Goal: Walks with improved pain control throughout the shift  Outcome: Progressing  Goal: Performs ADL's with improved pain control throughout shift  Outcome: Progressing  Goal: Participates in PT with improved pain control throughout the shift  Outcome: Progressing  Goal: Free from opioid side effects throughout the shift  Outcome: Progressing  Goal: Free from acute confusion related to pain meds throughout the shift  Outcome: Progressing     Problem: Respiratory  Goal: Clear secretions with interventions this shift  Outcome: Progressing  Goal: Minimal/no exertional discomfort or dyspnea this shift  Outcome: Progressing  Goal: No signs of respiratory distress (eg. Use of accessory muscles. Peds grunting)  Outcome: Progressing  Goal: Verbalize decreased shortness of breath this shift  Outcome: Progressing  Goal: Wean oxygen to maintain O2 saturation per order/standard this shift  Outcome: Progressing

## 2024-10-20 NOTE — PROGRESS NOTES
"Subjective   Patient reports that her headache started coming back this morning since yesterday and she was getting a dose of Toradol as I was speaking with her.  Otherwise her headache had been reasonable all day yesterday since the last dose of Toradol.     Objective   Neurological Exam  Physical Exam    Last Recorded Vitals  Blood pressure 151/87, pulse 62, temperature 36.1 °C (97 °F), temperature source Temporal, resp. rate 16, height 1.676 m (5' 6\"), weight 77.6 kg (171 lb), SpO2 97%.      Neurologically, pt is awake and oriented x4. Attention, concentration, memory, cortical processing are intact. No aphasia  Cranial nerves: VFF, EOMI, No nystagmus, Face is symmetric to sensory and motor, no dysarthria, Tongue protrudes midline, Shrug symmetric  Motor: 5/5 strength B throughout, no tremor or asterixis  Sensation is intact bilaterally throughout  Coordination: no ataxia, no dysmetria/dysdiadochokinesia         Scheduled medications  aspirin, 81 mg, oral, Daily  atorvastatin, 40 mg, oral, Nightly  cefTRIAXone, 1 g, intravenous, q24h  dexAMETHasone, 6 mg, oral, Daily with breakfast  DULoxetine, 30 mg, oral, Daily  enoxaparin, 40 mg, subcutaneous, Daily  hydrOXYzine HCL, 50 mg, oral, BID  ipratropium-albuteroL, 3 mL, nebulization, TID  nicotine, 1 patch, transdermal, Daily   Followed by  [START ON 11/29/2024] nicotine, 1 patch, transdermal, Daily   Followed by  [START ON 12/13/2024] nicotine, 1 patch, transdermal, Daily  oxygen, , inhalation, q8h  polyethylene glycol, 17 g, oral, Daily  pregabalin, 300 mg, oral, BID      Continuous medications     PRN medications  PRN medications: acetaminophen **OR** acetaminophen **OR** acetaminophen, ketorolac, ondansetron ODT **OR** ondansetron     Results for orders placed or performed during the hospital encounter of 10/17/24 (from the past 96 hours)   POCT GLUCOSE   Result Value Ref Range    POCT Glucose 121 (H) 74 - 99 mg/dL   Magnesium   Result Value Ref Range    " Magnesium 1.70 1.60 - 2.40 mg/dL   CBC and Auto Differential   Result Value Ref Range    WBC 6.7 4.4 - 11.3 x10*3/uL    nRBC 0.0 0.0 - 0.0 /100 WBCs    RBC 5.18 4.00 - 5.20 x10*6/uL    Hemoglobin 13.8 12.0 - 16.0 g/dL    Hematocrit 42.5 36.0 - 46.0 %    MCV 82 80 - 100 fL    MCH 26.6 26.0 - 34.0 pg    MCHC 32.5 32.0 - 36.0 g/dL    RDW 15.9 (H) 11.5 - 14.5 %    Platelets 201 150 - 450 x10*3/uL    Neutrophils % 69.3 40.0 - 80.0 %    Immature Granulocytes %, Automated 0.4 0.0 - 0.9 %    Lymphocytes % 16.6 13.0 - 44.0 %    Monocytes % 13.3 2.0 - 10.0 %    Eosinophils % 0.0 0.0 - 6.0 %    Basophils % 0.4 0.0 - 2.0 %    Neutrophils Absolute 4.64 1.20 - 7.70 x10*3/uL    Immature Granulocytes Absolute, Automated 0.03 0.00 - 0.70 x10*3/uL    Lymphocytes Absolute 1.11 (L) 1.20 - 4.80 x10*3/uL    Monocytes Absolute 0.89 0.10 - 1.00 x10*3/uL    Eosinophils Absolute 0.00 0.00 - 0.70 x10*3/uL    Basophils Absolute 0.03 0.00 - 0.10 x10*3/uL   Comprehensive metabolic panel   Result Value Ref Range    Glucose 102 (H) 74 - 99 mg/dL    Sodium 137 136 - 145 mmol/L    Potassium 3.5 3.5 - 5.3 mmol/L    Chloride 100 98 - 107 mmol/L    Bicarbonate 26 21 - 32 mmol/L    Anion Gap 15 10 - 20 mmol/L    Urea Nitrogen 7 6 - 23 mg/dL    Creatinine 0.88 0.50 - 1.05 mg/dL    eGFR 75 >60 mL/min/1.73m*2    Calcium 9.0 8.6 - 10.3 mg/dL    Albumin 4.0 3.4 - 5.0 g/dL    Alkaline Phosphatase 91 33 - 136 U/L    Total Protein 6.5 6.4 - 8.2 g/dL    AST 28 9 - 39 U/L    Bilirubin, Total 0.3 0.0 - 1.2 mg/dL    ALT 22 7 - 45 U/L   Blood Gas Venous   Result Value Ref Range    POCT pH, Venous 7.40 7.33 - 7.43 pH    POCT pCO2, Venous 48 41 - 51 mm Hg    POCT pO2, Venous 30 (L) 35 - 45 mm Hg    POCT SO2, Venous 38 (L) 45 - 75 %    POCT Oxy Hemoglobin, Venous 36.7 (L) 45.0 - 75.0 %    POCT Base Excess, Venous 4.0 (H) -2.0 - 3.0 mmol/L    POCT HCO3 Calculated, Venous 29.7 (H) 22.0 - 26.0 mmol/L    Patient Temperature 37.0 degrees Celsius    FiO2 95 %   Urinalysis  with Reflex Culture and Microscopic   Result Value Ref Range    Color, Urine Light-Yellow Light-Yellow, Yellow, Dark-Yellow    Appearance, Urine Turbid (N) Clear    Specific Gravity, Urine 1.010 1.005 - 1.035    pH, Urine 5.5 5.0, 5.5, 6.0, 6.5, 7.0, 7.5, 8.0    Protein, Urine 10 (TRACE) NEGATIVE, 10 (TRACE), 20 (TRACE) mg/dL    Glucose, Urine Normal Normal mg/dL    Blood, Urine NEGATIVE NEGATIVE    Ketones, Urine NEGATIVE NEGATIVE mg/dL    Bilirubin, Urine NEGATIVE NEGATIVE    Urobilinogen, Urine Normal Normal mg/dL    Nitrite, Urine NEGATIVE NEGATIVE    Leukocyte Esterase, Urine 500 Rosa/µL (A) NEGATIVE   Microscopic Only, Urine   Result Value Ref Range    WBC, Urine >50 (A) 1-5, NONE /HPF    WBC Clumps, Urine OCCASIONAL Reference range not established. /HPF    RBC, Urine 1-2 NONE, 1-2, 3-5 /HPF    Squamous Epithelial Cells, Urine 1-9 (SPARSE) Reference range not established. /HPF    Bacteria, Urine 1+ (A) NONE SEEN /HPF    Mucus, Urine FEW Reference range not established. /LPF   Urine Culture    Specimen: Clean Catch/Voided; Urine   Result Value Ref Range    Urine Culture >100,000 Escherichia coli (A)        Susceptibility    Escherichia coli - MICROSCAN     Ampicillin  Susceptible ug/ml     Cefazolin  Susceptible ug/ml     Cefazolin (uncomplicated UTIs only)  Susceptible ug/ml     Ciprofloxacin  Susceptible ug/ml     Gentamicin  Susceptible ug/ml     Nitrofurantoin  Susceptible ug/ml     Piperacillin/Tazobactam  Susceptible ug/ml     Trimethoprim/Sulfamethoxazole  Susceptible ug/ml   ECG 12 lead   Result Value Ref Range    Ventricular Rate 87 BPM    Atrial Rate 87 BPM    IN Interval 190 ms    QRS Duration 92 ms    QT Interval 356 ms    QTC Calculation(Bazett) 428 ms    P Axis 68 degrees    R Axis 68 degrees    T Axis 69 degrees    QRS Count 15 beats    Q Onset 226 ms    P Onset 131 ms    P Offset 188 ms    T Offset 404 ms    QTC Fredericia 402 ms   POCT GLUCOSE   Result Value Ref Range    POCT Glucose 89 74 - 99  mg/dL   Sars-CoV-2 PCR   Result Value Ref Range    Coronavirus 2019, PCR Detected (A) Not Detected   Influenza A, and B PCR   Result Value Ref Range    Flu A Result Not Detected Not Detected    Flu B Result Not Detected Not Detected   CBC   Result Value Ref Range    WBC 5.2 4.4 - 11.3 x10*3/uL    nRBC 0.0 0.0 - 0.0 /100 WBCs    RBC 4.68 4.00 - 5.20 x10*6/uL    Hemoglobin 12.6 12.0 - 16.0 g/dL    Hematocrit 38.4 36.0 - 46.0 %    MCV 82 80 - 100 fL    MCH 26.9 26.0 - 34.0 pg    MCHC 32.8 32.0 - 36.0 g/dL    RDW 15.9 (H) 11.5 - 14.5 %    Platelets 170 150 - 450 x10*3/uL   Basic metabolic panel   Result Value Ref Range    Glucose 92 74 - 99 mg/dL    Sodium 139 136 - 145 mmol/L    Potassium 3.2 (L) 3.5 - 5.3 mmol/L    Chloride 104 98 - 107 mmol/L    Bicarbonate 24 21 - 32 mmol/L    Anion Gap 14 10 - 20 mmol/L    Urea Nitrogen 6 6 - 23 mg/dL    Creatinine 0.75 0.50 - 1.05 mg/dL    eGFR >90 >60 mL/min/1.73m*2    Calcium 8.4 (L) 8.6 - 10.3 mg/dL   Procalcitonin   Result Value Ref Range    Procalcitonin 0.02 <=0.07 ng/mL   Basic Metabolic Panel   Result Value Ref Range    Glucose 126 (H) 74 - 99 mg/dL    Sodium 139 136 - 145 mmol/L    Potassium 4.6 3.5 - 5.3 mmol/L    Chloride 108 (H) 98 - 107 mmol/L    Bicarbonate 25 21 - 32 mmol/L    Anion Gap 11 10 - 20 mmol/L    Urea Nitrogen 12 6 - 23 mg/dL    Creatinine 0.69 0.50 - 1.05 mg/dL    eGFR >90 >60 mL/min/1.73m*2    Calcium 9.2 8.6 - 10.3 mg/dL   CBC   Result Value Ref Range    WBC 3.5 (L) 4.4 - 11.3 x10*3/uL    nRBC 0.0 0.0 - 0.0 /100 WBCs    RBC 4.90 4.00 - 5.20 x10*6/uL    Hemoglobin 13.1 12.0 - 16.0 g/dL    Hematocrit 40.3 36.0 - 46.0 %    MCV 82 80 - 100 fL    MCH 26.7 26.0 - 34.0 pg    MCHC 32.5 32.0 - 36.0 g/dL    RDW 15.6 (H) 11.5 - 14.5 %    Platelets 197 150 - 450 x10*3/uL   CBC   Result Value Ref Range    WBC 6.7 4.4 - 11.3 x10*3/uL    nRBC 0.0 0.0 - 0.0 /100 WBCs    RBC 4.83 4.00 - 5.20 x10*6/uL    Hemoglobin 12.9 12.0 - 16.0 g/dL    Hematocrit 40.5 36.0 - 46.0 %     MCV 84 80 - 100 fL    MCH 26.7 26.0 - 34.0 pg    MCHC 31.9 (L) 32.0 - 36.0 g/dL    RDW 15.9 (H) 11.5 - 14.5 %    Platelets 200 150 - 450 x10*3/uL   Basic Metabolic Panel   Result Value Ref Range    Glucose 144 (H) 74 - 99 mg/dL    Sodium 138 136 - 145 mmol/L    Potassium 4.8 3.5 - 5.3 mmol/L    Chloride 105 98 - 107 mmol/L    Bicarbonate 25 21 - 32 mmol/L    Anion Gap 13 10 - 20 mmol/L    Urea Nitrogen 13 6 - 23 mg/dL    Creatinine 0.71 0.50 - 1.05 mg/dL    eGFR >90 >60 mL/min/1.73m*2    Calcium 9.0 8.6 - 10.3 mg/dL          ECG 12 lead    Result Date: 10/18/2024  Normal sinus rhythm Normal ECG When compared with ECG of 08-DEC-2023 13:44, No significant change was found    XR chest 2 views    Result Date: 10/17/2024  Interpreted By:  Amrik Boyd, STUDY: XR CHEST 2 VIEWS;  10/17/2024 11:23 pm   INDICATION: Signs/Symptoms:hypoxia.     COMPARISON: 08/10/2022   ACCESSION NUMBER(S): [id number]   ORDERING CLINICIAN: SAHIL NIELSON   FINDINGS: PA and lateral radiographs of the chest were provided.       CARDIOMEDIASTINAL SILHOUETTE: Cardiomediastinal silhouette is normal in size and configuration.   LUNGS: Lungs are clear.   ABDOMEN: No remarkable upper abdominal findings.   BONES: No acute osseous changes.       1.  No evidence of acute cardiopulmonary process.       MACRO: None   Signed by: Amrik Boyd 10/17/2024 11:27 PM Dictation workstation:   [id number]    CT angio head and neck w and wo IV contrast    Result Date: 10/17/2024  Interpreted By:  Miko Aguila, STUDY: CT ANGIO HEAD AND NECK W AND WO IV CONTRAST;  10/17/2024 9:29 pm   INDICATION: Signs/Symptoms:dizziness, headache,.   COMPARISON: MRI of the brain and MRA of the head and neck dated 08/10/2022   ACCESSION NUMBER(S): [id number]   ORDERING CLINICIAN: SAHIL NIELSON   TECHNIQUE: Unenhanced CT images of the head were obtained. Subsequently,   was administered intravenously and axial images of the head and neck were acquired.  Coronal,  sagittal, and 3-D reconstructions were provided for review.   FINDINGS: Noncontrast axial CT images of the head do not demonstrate any evidence of hyperdense intracranial hemorrhage. There is no mass effect or midline shift.   Geographic area of cystic encephalomalacia and gliosis is present in the right frontal lobe, likely representing sequela of prior infarct/injury, similar to prior exam in August of 2022. Subtle attenuation changes present in the bilateral cerebral hemispheres likely represent sequela of microvascular disease.   No abnormal ventricular dilatation is present. Basal cisterns are patent. No extra-axial fluid collections are identified.   Scalp soft tissues do not demonstrate any acute abnormality. No acute calvarial abnormality is evident. Mastoid air cells and middle ear cavities are clear.   Visualized paranasal sinuses are unremarkable in appearance.   CTA HEAD FINDINGS:   CT images of the head is somewhat degraded by motion.   Intracranial carotid arteries demonstrate symmetric opacification bilaterally without evidence of occlusion or high-grade stenosis. Carotid terminals are symmetric in appearance.   The anterior cerebral arteries are supplied predominantly by the right A1 with aplastic/hypoplastic left A1. More distally the anterior cerebral arteries demonstrate preserved opacification without evidence of occlusion.   Middle cerebral arteries are symmetric in appearance without evidence of high-grade stenosis in the M1 segments or focal vessel occlusion in the more distal cortical M2 and M3 branches.   Intracranial vertebral arteries demonstrate anatomic variant dominant right vertebral artery without evidence of occlusion. Basilar artery slightly diminutive in appearance without evidence of occlusion.   The posterior cerebral arteries receive code dominant supply from the posterior communicating arteries bilaterally and demonstrate symmetric opacification distally without evidence of  occlusion.   No enhancing masses or vascular malformations are identified. No thrombus is identified in the opacified dural venous sinuses.   CTA NECK FINDINGS:   Evaluation of the upper chest and lower neck is degraded by combination of motion and beam hardening artifact from the contrast bolus in the left subclavian vein.   Within limitations of the study a 4 vessel aortic arch is present, with direct origin of the left vertebral artery from the aortic arch. Minimal atherosclerotic changes are present to the arch in the origin of the great vessels without significant stenosis.   Common carotid arteries demonstrate symmetric opacification bilaterally without evidence of occlusion or stenosis. There is somewhat retropharyngeal course of the right common carotid artery and proximal right extracranial internal carotid artery.   Extracranial internal carotid arteries demonstrate symmetric opacification without evidence of occlusion or significant stenosis by NASCET criteria.   Extracranial vertebral arteries demonstrate symmetric opacification without evidence of occlusion or stenosis. The right vertebral artery originates from the right subclavian artery.   Soft tissues of the neck and skull base do not demonstrate any acute abnormality.   Multilevel degenerative changes are present in the cervical spine without evidence of compression fracture or high-grade stenosis.       1. Noncontrast CT images of the head do not demonstrate any evidence of hemorrhage, CT apparent transcortical infarct, or other emergent intracranial abnormality. 2. No large vessel occlusion is identified in the proximal intracranial circulation. No significant stenosis is present in the large arteries of the neck. 3. Geographic area of cystic encephalomalacia and gliosis in the right frontal lobe likely represent sequela of prior infarct/injury, similar in appearance to prior exam in August of 2022. Subtle attenuation changes in the bilateral  cerebral hemispheric white matter likely represent sequela of microvascular disease.   MACRO: None   Signed by: Miko Aguila 10/17/2024 10:11 PM Dictation workstation:   [id number]       Assessment/Plan   Principal Problem:    Chronic obstructive pulmonary disease with acute exacerbation (Multi)  Active Problems:    Migraine without status migrainosus, not intractable, unspecified migraine type    Acute cystitis without hematuria    Hypoxia    59-year-old woman with persistent headache in the setting of COVID-19 infection.  Thus far her headache is well-controlled with daily use of Toradol.  She has a family history of ulcers but no personal history of gastric ulcer.  I think it is reasonable for her to use NSAIDs daily for at least 5 days and increasingly sparingly thereafter.  I discussed this matter with her, as she mentioned that since having started aspirin daily she was instructed not to take NSAIDs.  I did add pantoprazole p.o. today.  This should be continued for the duration of her treatment with steroid and NSAID.  Otherwise she is neurologically stable and I will sign off.  She may follow-up with me as needed as an outpatient.  Call with any questions or concerns       I personally spent 32 minutes today, exclusive of procedures, providing care for this patient, including preparation, face to face time, documentation and other services such as review of medical records, diagnostic result, patient education, counseling, coordination of care as specified in the encounter.

## 2024-10-21 ENCOUNTER — PHARMACY VISIT (OUTPATIENT)
Dept: PHARMACY | Facility: CLINIC | Age: 59
End: 2024-10-21
Payer: MEDICAID

## 2024-10-21 VITALS
TEMPERATURE: 97.5 F | SYSTOLIC BLOOD PRESSURE: 143 MMHG | HEIGHT: 66 IN | HEART RATE: 69 BPM | DIASTOLIC BLOOD PRESSURE: 87 MMHG | RESPIRATION RATE: 17 BRPM | WEIGHT: 171 LBS | BODY MASS INDEX: 27.48 KG/M2 | OXYGEN SATURATION: 94 %

## 2024-10-21 PROCEDURE — S4991 NICOTINE PATCH NONLEGEND: HCPCS | Performed by: STUDENT IN AN ORGANIZED HEALTH CARE EDUCATION/TRAINING PROGRAM

## 2024-10-21 PROCEDURE — 2500000004 HC RX 250 GENERAL PHARMACY W/ HCPCS (ALT 636 FOR OP/ED): Performed by: NURSE PRACTITIONER

## 2024-10-21 PROCEDURE — RXMED WILLOW AMBULATORY MEDICATION CHARGE

## 2024-10-21 PROCEDURE — 99239 HOSP IP/OBS DSCHRG MGMT >30: CPT | Performed by: NURSE PRACTITIONER

## 2024-10-21 PROCEDURE — 2500000005 HC RX 250 GENERAL PHARMACY W/O HCPCS: Performed by: STUDENT IN AN ORGANIZED HEALTH CARE EDUCATION/TRAINING PROGRAM

## 2024-10-21 PROCEDURE — 2500000001 HC RX 250 WO HCPCS SELF ADMINISTERED DRUGS (ALT 637 FOR MEDICARE OP): Performed by: STUDENT IN AN ORGANIZED HEALTH CARE EDUCATION/TRAINING PROGRAM

## 2024-10-21 PROCEDURE — 2500000002 HC RX 250 W HCPCS SELF ADMINISTERED DRUGS (ALT 637 FOR MEDICARE OP, ALT 636 FOR OP/ED): Performed by: STUDENT IN AN ORGANIZED HEALTH CARE EDUCATION/TRAINING PROGRAM

## 2024-10-21 PROCEDURE — 2500000004 HC RX 250 GENERAL PHARMACY W/ HCPCS (ALT 636 FOR OP/ED): Performed by: STUDENT IN AN ORGANIZED HEALTH CARE EDUCATION/TRAINING PROGRAM

## 2024-10-21 PROCEDURE — 99232 SBSQ HOSP IP/OBS MODERATE 35: CPT | Performed by: NURSE PRACTITIONER

## 2024-10-21 PROCEDURE — 2500000001 HC RX 250 WO HCPCS SELF ADMINISTERED DRUGS (ALT 637 FOR MEDICARE OP): Performed by: NURSE PRACTITIONER

## 2024-10-21 PROCEDURE — 2500000001 HC RX 250 WO HCPCS SELF ADMINISTERED DRUGS (ALT 637 FOR MEDICARE OP): Performed by: PSYCHIATRY & NEUROLOGY

## 2024-10-21 RX ORDER — DEXAMETHASONE 2 MG/1
6 TABLET ORAL
Qty: 18 TABLET | Refills: 0 | Status: SHIPPED | OUTPATIENT
Start: 2024-10-22

## 2024-10-21 ASSESSMENT — COGNITIVE AND FUNCTIONAL STATUS - GENERAL
DAILY ACTIVITIY SCORE: 24
MOBILITY SCORE: 24

## 2024-10-21 ASSESSMENT — PAIN SCALES - GENERAL
PAINLEVEL_OUTOF10: 2
PAINLEVEL_OUTOF10: 5 - MODERATE PAIN
PAINLEVEL_OUTOF10: 3
PAINLEVEL_OUTOF10: 7
PAINLEVEL_OUTOF10: 0 - NO PAIN

## 2024-10-21 ASSESSMENT — PAIN DESCRIPTION - LOCATION
LOCATION: HEAD
LOCATION: HEAD

## 2024-10-21 ASSESSMENT — PAIN - FUNCTIONAL ASSESSMENT
PAIN_FUNCTIONAL_ASSESSMENT: 0-10

## 2024-10-21 NOTE — PROGRESS NOTES
10/21/24 1229   Discharge Planning   Expected Discharge Disposition Home   Does the patient need discharge transport arranged? No     Home no needs

## 2024-10-21 NOTE — DOCUMENTATION CLARIFICATION NOTE
"    PATIENT:               PRICE LUCAS  ACCT #:                  [id number]  MRN:                       [id number]  :                       1965  ADMIT DATE:       10/17/2024 7:10 PM  DISCH DATE:  RESPONDING PROVIDER #:        [id number]          PROVIDER RESPONSE TEXT:    Patient treated for Covid without Sepsis    CDI QUERY TEXT:    Clarification        Instruction:  Based on your assessment of the patient and the clinical information, please provide the requested documentation by clicking on the appropriate radio button and enter any additional information if prompted.    Question: Is there a diagnosis indicative of a patient meeting SIRS criteria and with organ dysfunction in the setting of Covid infection    When answering this query, please exercise your independent professional judgment. The fact that a question is being asked, does not imply that any particular answer is desired or expected.    The patient's clinical indicators include:  Clinical Information: ED: 10/17: \"59 y.o. female presenting with past medical hx of Migraine, COPD (not on home oxygen), anxiety, HLD, Smoking came to ED with complains of dizziness and imbalance started yesterday\"    Clinical Indicators:    SIRS criteria:  VS: 10/17: , 95, 90, RR 27, 23  10/19:   WBC: 3.5 10/19    Source of infection: Covid \"59-year-old woman with persistent headache in the setting of COVID-19 infection.\" 10/20: Neuro PN  \"COVID 19: Isolation precautions.\" PN 10/20 PCP  UTI: \"UTI: UA suggests UTI. Urine culture finalized e.coli- pansensitive.\"    Organ dysfunction: Acute respiratory failure  Migraine: \"59-year-old woman with persistent headache in the setting of COVID-19 infection.\" \" Migraine without status migrainosus, not intractable\"    Treatment: Ceftriaxone 1 gm iv daily, Decadron 6 mg p.o. daily, Toradol 30 mg iv x 2,    Risk Factors: COPD, Tobacco, CVA, htn  Options provided:  -- Sepsis with respiratory organ dysfunction of " Acute respiratory failure  -- Patient treated for Covid without Sepsis  -- Sepsis with neurological organ dysfunction of Migraine  -- Other - I will add my own diagnosis  -- Refer to Clinical Documentation Reviewer    Query created by: Shalom Harvey on 10/21/2024 1:09 PM      Electronically signed by:  ANATOLY PETE 10/21/2024 1:12 PM

## 2024-10-21 NOTE — DISCHARGE SUMMARY
Discharge Diagnosis  Chronic obstructive pulmonary disease with acute exacerbation (Multi)    Issues Requiring Follow-Up      Discharge Meds     Medication List      START taking these medications     dexAMETHasone 2 mg tablet; Commonly known as: Decadron; Take 3 tablets   (6 mg) by mouth once daily with breakfast. Do not fill before October 22, 2024.; Start taking on: October 22, 2024     CHANGE how you take these medications     pregabalin 150 mg capsule; Commonly known as: Lyrica; Take 1 capsule   (150 mg) by mouth 3 times a day.; What changed: how much to take, when to   take this     CONTINUE taking these medications     albuterol 90 mcg/actuation inhaler; Commonly known as: Ventolin HFA;   Inhale 1 to 2 puffs by mouth and into the lungs every 4 to 6 hours if   needed.   aspirin 81 mg EC tablet   atorvastatin 40 mg tablet; Commonly known as: Lipitor; take 1 tablet by   mouth once daily   budesonide-formoteroL 80-4.5 mcg/actuation inhaler; Commonly known as:   Symbicort; Inhale 2 puffs 2 times a day. Rinse mouth with water after use   to reduce aftertaste and incidence of candidiasis. Do not swallow.   DULoxetine 30 mg DR capsule; Commonly known as: Cymbalta   hydrOXYzine HCL 50 mg tablet; Commonly known as: Atarax   Vyvanse 40 mg capsule; Generic drug: lisdexamfetamine       Test Results Pending At Discharge  Pending Labs       Order Current Status    Extra Urine Gray Tube Collected (10/17/24 2005)    Urinalysis with Reflex Culture and Microscopic In process            Hospital Course   Pt with COPD admitted for worsening dyspnea secondary to COVID-19. Pt was treated with Decadron and oxygen. Pt also found to have UTI with Ecoli and has completed 4 day course of IV Abx. Has been weaned from oxygen x 24 hrs and is requesting discharge. Will complete course of oral Decadron on discharge. To follow up with her PCP. Medically stable for discharge.     Pertinent Physical Exam At Time of Discharge  Physical  Exam  HENT:      Head: Normocephalic and atraumatic.      Nose: Nose normal.      Mouth/Throat:      Mouth: Mucous membranes are moist.      Pharynx: Oropharynx is clear.   Eyes:      Extraocular Movements: Extraocular movements intact.      Pupils: Pupils are equal, round, and reactive to light.   Cardiovascular:      Rate and Rhythm: Normal rate and regular rhythm.      Pulses: Normal pulses.   Pulmonary:      Effort: Pulmonary effort is normal.      Breath sounds: Normal breath sounds.      Comments: Clear/diminished  Abdominal:      General: Abdomen is flat. Bowel sounds are normal.      Palpations: Abdomen is soft.   Musculoskeletal:         General: Normal range of motion.   Skin:     General: Skin is warm and dry.      Capillary Refill: Capillary refill takes less than 2 seconds.   Neurological:      General: No focal deficit present.      Mental Status: She is oriented to person, place, and time.   Psychiatric:         Mood and Affect: Mood normal.         Outpatient Follow-Up  Future Appointments   Date Time Provider Department Center   10/29/2024 12:30 PM Vilma Britt MD [id number] [facility]   11/4/2024 12:30 PM Vilma Britt MD [id number] [facility]   12/16/2024  2:45 PM Samantha Chacon MD [id number] [facility]         Yadi Delatorre, APRN-CNP

## 2024-10-21 NOTE — NURSING NOTE
COPD education/book given to patient. Discussed importance of taking medications as prescribed/following up with physician appointments. Discussed triggers of COPD (smoking, stress/anxiety, pet dander, weather, perfumes..). Smoking cessation education/handout given to patient. Pulmonary rehab education given to patient. Patient encouraged to call pulmonary rehab post discharge.

## 2024-10-21 NOTE — CARE PLAN
The patient's goals for the shift include      The clinical goals for the shift include monitor vitals, and safety      Problem: Pain - Adult  Goal: Verbalizes/displays adequate comfort level or baseline comfort level  Outcome: Progressing     Problem: Safety - Adult  Goal: Free from fall injury  Outcome: Progressing     Problem: Discharge Planning  Goal: Discharge to home or other facility with appropriate resources  Outcome: Progressing     Problem: Chronic Conditions and Co-morbidities  Goal: Patient's chronic conditions and co-morbidity symptoms are monitored and maintained or improved  Outcome: Progressing     Problem: Pain  Goal: Takes deep breaths with improved pain control throughout the shift  Outcome: Progressing  Goal: Turns in bed with improved pain control throughout the shift  Outcome: Progressing  Goal: Walks with improved pain control throughout the shift  Outcome: Progressing  Goal: Performs ADL's with improved pain control throughout shift  Outcome: Progressing  Goal: Participates in PT with improved pain control throughout the shift  Outcome: Progressing  Goal: Free from opioid side effects throughout the shift  Outcome: Progressing  Goal: Free from acute confusion related to pain meds throughout the shift  Outcome: Progressing     Problem: Respiratory  Goal: Clear secretions with interventions this shift  Outcome: Progressing  Goal: Minimal/no exertional discomfort or dyspnea this shift  Outcome: Progressing  Goal: No signs of respiratory distress (eg. Use of accessory muscles. Peds grunting)  Outcome: Progressing  Goal: Verbalize decreased shortness of breath this shift  Outcome: Progressing  Goal: Wean oxygen to maintain O2 saturation per order/standard this shift  Outcome: Progressing

## 2024-10-21 NOTE — PROGRESS NOTES
"Mona Sanders is a 59 y.o. female on day 4 of admission seen in follow-up for acute hypoxic respiratory failure, COVID-19    Subjective   On room air; oxygen sats percent.  No respiratory complaints.  Denies pain.  Afebrile.       Objective     Physical Exam  Vitals and nursing note reviewed.   Constitutional:       Appearance: Normal appearance.   HENT:      Head: Normocephalic and atraumatic.      Nose: Nose normal.      Mouth/Throat:      Mouth: Mucous membranes are moist.   Eyes:      Extraocular Movements: Extraocular movements intact.      Conjunctiva/sclera: Conjunctivae normal.      Pupils: Pupils are equal, round, and reactive to light.   Cardiovascular:      Rate and Rhythm: Normal rate and regular rhythm.      Pulses: Normal pulses.      Heart sounds: Normal heart sounds.   Pulmonary:      Effort: Pulmonary effort is normal.      Comments: Lungs diminished but clear.  Abdominal:      General: Bowel sounds are normal.      Palpations: Abdomen is soft.   Musculoskeletal:         General: Normal range of motion.   Skin:     General: Skin is warm and dry.      Capillary Refill: Capillary refill takes less than 2 seconds.   Neurological:      General: No focal deficit present.      Mental Status: She is alert and oriented to person, place, and time.   Psychiatric:         Mood and Affect: Mood normal.         Behavior: Behavior normal.         Last Recorded Vitals  Blood pressure 143/87, pulse 69, temperature 36.4 °C (97.5 °F), temperature source Temporal, resp. rate 17, height 1.676 m (5' 6\"), weight 77.6 kg (171 lb), SpO2 94%.  Intake/Output last 3 Shifts:  I/O last 3 completed shifts:  In: 300 (3.9 mL/kg) [P.O.:300]  Out: 100 (1.3 mL/kg) [Urine:100 (0 mL/kg/hr)]  Weight: 77.6 kg     Scheduled medications:  aspirin, 81 mg, oral, Daily  atorvastatin, 40 mg, oral, Nightly  cefTRIAXone, 1 g, intravenous, q24h  dexAMETHasone, 6 mg, oral, Daily with breakfast  DULoxetine, 30 mg, oral, Daily  enoxaparin, 40 " mg, subcutaneous, Daily  hydrOXYzine HCL, 50 mg, oral, BID  ipratropium-albuteroL, 3 mL, nebulization, TID  nicotine, 1 patch, transdermal, Daily   Followed by  [START ON 11/29/2024] nicotine, 1 patch, transdermal, Daily   Followed by  [START ON 12/13/2024] nicotine, 1 patch, transdermal, Daily  oxygen, , inhalation, q8h  pantoprazole, 40 mg, oral, Daily before breakfast  polyethylene glycol, 17 g, oral, Daily  pregabalin, 300 mg, oral, BID    PRN medications: acetaminophen **OR** acetaminophen **OR** acetaminophen, ketorolac, melatonin, ondansetron ODT **OR** ondansetron         Relevant Results  Results for orders placed or performed during the hospital encounter of 10/17/24 (from the past 96 hours)   POCT GLUCOSE   Result Value Ref Range    POCT Glucose 121 (H) 74 - 99 mg/dL   Magnesium   Result Value Ref Range    Magnesium 1.70 1.60 - 2.40 mg/dL   CBC and Auto Differential   Result Value Ref Range    WBC 6.7 4.4 - 11.3 x10*3/uL    nRBC 0.0 0.0 - 0.0 /100 WBCs    RBC 5.18 4.00 - 5.20 x10*6/uL    Hemoglobin 13.8 12.0 - 16.0 g/dL    Hematocrit 42.5 36.0 - 46.0 %    MCV 82 80 - 100 fL    MCH 26.6 26.0 - 34.0 pg    MCHC 32.5 32.0 - 36.0 g/dL    RDW 15.9 (H) 11.5 - 14.5 %    Platelets 201 150 - 450 x10*3/uL    Neutrophils % 69.3 40.0 - 80.0 %    Immature Granulocytes %, Automated 0.4 0.0 - 0.9 %    Lymphocytes % 16.6 13.0 - 44.0 %    Monocytes % 13.3 2.0 - 10.0 %    Eosinophils % 0.0 0.0 - 6.0 %    Basophils % 0.4 0.0 - 2.0 %    Neutrophils Absolute 4.64 1.20 - 7.70 x10*3/uL    Immature Granulocytes Absolute, Automated 0.03 0.00 - 0.70 x10*3/uL    Lymphocytes Absolute 1.11 (L) 1.20 - 4.80 x10*3/uL    Monocytes Absolute 0.89 0.10 - 1.00 x10*3/uL    Eosinophils Absolute 0.00 0.00 - 0.70 x10*3/uL    Basophils Absolute 0.03 0.00 - 0.10 x10*3/uL   Comprehensive metabolic panel   Result Value Ref Range    Glucose 102 (H) 74 - 99 mg/dL    Sodium 137 136 - 145 mmol/L    Potassium 3.5 3.5 - 5.3 mmol/L    Chloride 100 98 - 107  mmol/L    Bicarbonate 26 21 - 32 mmol/L    Anion Gap 15 10 - 20 mmol/L    Urea Nitrogen 7 6 - 23 mg/dL    Creatinine 0.88 0.50 - 1.05 mg/dL    eGFR 75 >60 mL/min/1.73m*2    Calcium 9.0 8.6 - 10.3 mg/dL    Albumin 4.0 3.4 - 5.0 g/dL    Alkaline Phosphatase 91 33 - 136 U/L    Total Protein 6.5 6.4 - 8.2 g/dL    AST 28 9 - 39 U/L    Bilirubin, Total 0.3 0.0 - 1.2 mg/dL    ALT 22 7 - 45 U/L   Blood Gas Venous   Result Value Ref Range    POCT pH, Venous 7.40 7.33 - 7.43 pH    POCT pCO2, Venous 48 41 - 51 mm Hg    POCT pO2, Venous 30 (L) 35 - 45 mm Hg    POCT SO2, Venous 38 (L) 45 - 75 %    POCT Oxy Hemoglobin, Venous 36.7 (L) 45.0 - 75.0 %    POCT Base Excess, Venous 4.0 (H) -2.0 - 3.0 mmol/L    POCT HCO3 Calculated, Venous 29.7 (H) 22.0 - 26.0 mmol/L    Patient Temperature 37.0 degrees Celsius    FiO2 95 %   Urinalysis with Reflex Culture and Microscopic   Result Value Ref Range    Color, Urine Light-Yellow Light-Yellow, Yellow, Dark-Yellow    Appearance, Urine Turbid (N) Clear    Specific Gravity, Urine 1.010 1.005 - 1.035    pH, Urine 5.5 5.0, 5.5, 6.0, 6.5, 7.0, 7.5, 8.0    Protein, Urine 10 (TRACE) NEGATIVE, 10 (TRACE), 20 (TRACE) mg/dL    Glucose, Urine Normal Normal mg/dL    Blood, Urine NEGATIVE NEGATIVE    Ketones, Urine NEGATIVE NEGATIVE mg/dL    Bilirubin, Urine NEGATIVE NEGATIVE    Urobilinogen, Urine Normal Normal mg/dL    Nitrite, Urine NEGATIVE NEGATIVE    Leukocyte Esterase, Urine 500 Rosa/µL (A) NEGATIVE   Microscopic Only, Urine   Result Value Ref Range    WBC, Urine >50 (A) 1-5, NONE /HPF    WBC Clumps, Urine OCCASIONAL Reference range not established. /HPF    RBC, Urine 1-2 NONE, 1-2, 3-5 /HPF    Squamous Epithelial Cells, Urine 1-9 (SPARSE) Reference range not established. /HPF    Bacteria, Urine 1+ (A) NONE SEEN /HPF    Mucus, Urine FEW Reference range not established. /LPF   Urine Culture    Specimen: Clean Catch/Voided; Urine   Result Value Ref Range    Urine Culture >100,000 Escherichia coli (A)         Susceptibility    Escherichia coli - MICROSCAN     Ampicillin  Susceptible ug/ml     Cefazolin  Susceptible ug/ml     Cefazolin (uncomplicated UTIs only)  Susceptible ug/ml     Ciprofloxacin  Susceptible ug/ml     Gentamicin  Susceptible ug/ml     Nitrofurantoin  Susceptible ug/ml     Piperacillin/Tazobactam  Susceptible ug/ml     Trimethoprim/Sulfamethoxazole  Susceptible ug/ml   ECG 12 lead   Result Value Ref Range    Ventricular Rate 87 BPM    Atrial Rate 87 BPM    NM Interval 190 ms    QRS Duration 92 ms    QT Interval 356 ms    QTC Calculation(Bazett) 428 ms    P Axis 68 degrees    R Axis 68 degrees    T Axis 69 degrees    QRS Count 15 beats    Q Onset 226 ms    P Onset 131 ms    P Offset 188 ms    T Offset 404 ms    QTC Fredericia 402 ms   POCT GLUCOSE   Result Value Ref Range    POCT Glucose 89 74 - 99 mg/dL   Sars-CoV-2 PCR   Result Value Ref Range    Coronavirus 2019, PCR Detected (A) Not Detected   Influenza A, and B PCR   Result Value Ref Range    Flu A Result Not Detected Not Detected    Flu B Result Not Detected Not Detected   CBC   Result Value Ref Range    WBC 5.2 4.4 - 11.3 x10*3/uL    nRBC 0.0 0.0 - 0.0 /100 WBCs    RBC 4.68 4.00 - 5.20 x10*6/uL    Hemoglobin 12.6 12.0 - 16.0 g/dL    Hematocrit 38.4 36.0 - 46.0 %    MCV 82 80 - 100 fL    MCH 26.9 26.0 - 34.0 pg    MCHC 32.8 32.0 - 36.0 g/dL    RDW 15.9 (H) 11.5 - 14.5 %    Platelets 170 150 - 450 x10*3/uL   Basic metabolic panel   Result Value Ref Range    Glucose 92 74 - 99 mg/dL    Sodium 139 136 - 145 mmol/L    Potassium 3.2 (L) 3.5 - 5.3 mmol/L    Chloride 104 98 - 107 mmol/L    Bicarbonate 24 21 - 32 mmol/L    Anion Gap 14 10 - 20 mmol/L    Urea Nitrogen 6 6 - 23 mg/dL    Creatinine 0.75 0.50 - 1.05 mg/dL    eGFR >90 >60 mL/min/1.73m*2    Calcium 8.4 (L) 8.6 - 10.3 mg/dL   Procalcitonin   Result Value Ref Range    Procalcitonin 0.02 <=0.07 ng/mL   Basic Metabolic Panel   Result Value Ref Range    Glucose 126 (H) 74 - 99 mg/dL    Sodium  139 136 - 145 mmol/L    Potassium 4.6 3.5 - 5.3 mmol/L    Chloride 108 (H) 98 - 107 mmol/L    Bicarbonate 25 21 - 32 mmol/L    Anion Gap 11 10 - 20 mmol/L    Urea Nitrogen 12 6 - 23 mg/dL    Creatinine 0.69 0.50 - 1.05 mg/dL    eGFR >90 >60 mL/min/1.73m*2    Calcium 9.2 8.6 - 10.3 mg/dL   CBC   Result Value Ref Range    WBC 3.5 (L) 4.4 - 11.3 x10*3/uL    nRBC 0.0 0.0 - 0.0 /100 WBCs    RBC 4.90 4.00 - 5.20 x10*6/uL    Hemoglobin 13.1 12.0 - 16.0 g/dL    Hematocrit 40.3 36.0 - 46.0 %    MCV 82 80 - 100 fL    MCH 26.7 26.0 - 34.0 pg    MCHC 32.5 32.0 - 36.0 g/dL    RDW 15.6 (H) 11.5 - 14.5 %    Platelets 197 150 - 450 x10*3/uL   CBC   Result Value Ref Range    WBC 6.7 4.4 - 11.3 x10*3/uL    nRBC 0.0 0.0 - 0.0 /100 WBCs    RBC 4.83 4.00 - 5.20 x10*6/uL    Hemoglobin 12.9 12.0 - 16.0 g/dL    Hematocrit 40.5 36.0 - 46.0 %    MCV 84 80 - 100 fL    MCH 26.7 26.0 - 34.0 pg    MCHC 31.9 (L) 32.0 - 36.0 g/dL    RDW 15.9 (H) 11.5 - 14.5 %    Platelets 200 150 - 450 x10*3/uL   Basic Metabolic Panel   Result Value Ref Range    Glucose 144 (H) 74 - 99 mg/dL    Sodium 138 136 - 145 mmol/L    Potassium 4.8 3.5 - 5.3 mmol/L    Chloride 105 98 - 107 mmol/L    Bicarbonate 25 21 - 32 mmol/L    Anion Gap 13 10 - 20 mmol/L    Urea Nitrogen 13 6 - 23 mg/dL    Creatinine 0.71 0.50 - 1.05 mg/dL    eGFR >90 >60 mL/min/1.73m*2    Calcium 9.0 8.6 - 10.3 mg/dL        XR chest 2 views  Result Date: 10/17/2024  FINDINGS: PA and lateral radiographs of the chest were provided.       CARDIOMEDIASTINAL SILHOUETTE: Cardiomediastinal silhouette is normal in size and configuration.   LUNGS: Lungs are clear.   ABDOMEN: No remarkable upper abdominal findings.   BONES: No acute osseous changes. 1.  No evidence of acute cardiopulmonary process.          CT angio head and neck w and wo IV contrast  Result Date: 10/17/2024  Interpreted By:  Miko Aguila, STUDY: CT ANGIO HEAD AND NECK W AND WO IV CONTRAST;  10/17/2024 9:29 pm   INDICATION:  Signs/Symptoms:dizziness, headache,.   COMPARISON: MRI of the brain and MRA of the head and neck dated 08/10/2022   ACCESSION NUMBER(S): [id number]   ORDERING CLINICIAN: SAHIL NIELSON   TECHNIQUE: Unenhanced CT images of the head were obtained. Subsequently,   was administered intravenously and axial images of the head and neck were acquired.  Coronal, sagittal, and 3-D reconstructions were provided for review.   FINDINGS: Noncontrast axial CT images of the head do not demonstrate any evidence of hyperdense intracranial hemorrhage. There is no mass effect or midline shift.   Geographic area of cystic encephalomalacia and gliosis is present in the right frontal lobe, likely representing sequela of prior infarct/injury, similar to prior exam in August of 2022. Subtle attenuation changes present in the bilateral cerebral hemispheres likely represent sequela of microvascular disease.   No abnormal ventricular dilatation is present. Basal cisterns are patent. No extra-axial fluid collections are identified.   Scalp soft tissues do not demonstrate any acute abnormality. No acute calvarial abnormality is evident. Mastoid air cells and middle ear cavities are clear.   Visualized paranasal sinuses are unremarkable in appearance.   CTA HEAD FINDINGS:   CT images of the head is somewhat degraded by motion.   Intracranial carotid arteries demonstrate symmetric opacification bilaterally without evidence of occlusion or high-grade stenosis. Carotid terminals are symmetric in appearance.   The anterior cerebral arteries are supplied predominantly by the right A1 with aplastic/hypoplastic left A1. More distally the anterior cerebral arteries demonstrate preserved opacification without evidence of occlusion.   Middle cerebral arteries are symmetric in appearance without evidence of high-grade stenosis in the M1 segments or focal vessel occlusion in the more distal cortical M2 and M3 branches.   Intracranial vertebral arteries  demonstrate anatomic variant dominant right vertebral artery without evidence of occlusion. Basilar artery slightly diminutive in appearance without evidence of occlusion.   The posterior cerebral arteries receive code dominant supply from the posterior communicating arteries bilaterally and demonstrate symmetric opacification distally without evidence of occlusion.   No enhancing masses or vascular malformations are identified. No thrombus is identified in the opacified dural venous sinuses.   CTA NECK FINDINGS:   Evaluation of the upper chest and lower neck is degraded by combination of motion and beam hardening artifact from the contrast bolus in the left subclavian vein.   Within limitations of the study a 4 vessel aortic arch is present, with direct origin of the left vertebral artery from the aortic arch. Minimal atherosclerotic changes are present to the arch in the origin of the great vessels without significant stenosis.   Common carotid arteries demonstrate symmetric opacification bilaterally without evidence of occlusion or stenosis. There is somewhat retropharyngeal course of the right common carotid artery and proximal right extracranial internal carotid artery.   Extracranial internal carotid arteries demonstrate symmetric opacification without evidence of occlusion or significant stenosis by NASCET criteria.   Extracranial vertebral arteries demonstrate symmetric opacification without evidence of occlusion or stenosis. The right vertebral artery originates from the right subclavian artery.   Soft tissues of the neck and skull base do not demonstrate any acute abnormality.   Multilevel degenerative changes are present in the cervical spine without evidence of compression fracture or high-grade stenosis.  1. Noncontrast CT images of the head do not demonstrate any evidence of hemorrhage, CT apparent transcortical infarct, or other emergent intracranial abnormality. 2. No large vessel occlusion is  identified in the proximal intracranial circulation. No significant stenosis is present in the large arteries of the neck. 3. Geographic area of cystic encephalomalacia and gliosis in the right frontal lobe likely represent sequela of prior infarct/injury, similar in appearance to prior exam in August of 2022. Subtle attenuation changes in the bilateral cerebral hemispheric white matter likely represent sequela of microvascular disease.     Assessment/Plan   Acute hypoxic respiratory failure  Stable on room air  Continuous pulse oximetry  Incentive spirometry/pulmonary hygiene     COVID-19  Continue oral dexamethasone     Acute COPD exacerbation  Continue IBD/ICS  FEV1 as outpatient     Urinary tract infection   IV ceftriaxone  Follow-up culture     Dizziness/History of migraine headaches  Analgesia  Neurology follow-up     Tobacco use  Smoking cessation  Nicotine patch     Prophylaxis  Subcutaneous Lovenox     PT/OT/out of bed      Anupama Matias, APRN-CNP

## 2024-10-22 ENCOUNTER — PATIENT OUTREACH (OUTPATIENT)
Dept: PRIMARY CARE | Facility: CLINIC | Age: 59
End: 2024-10-22
Payer: COMMERCIAL

## 2024-10-22 NOTE — PROGRESS NOTES
Discharge Facility: [facility]  Discharge Diagnosis: Chronic obstructive pulmonary disease with acute exacerbation   Admission Date: 10/18/2024  Discharge Date: 10/21/2024    PCP Appointment Date: 10/29/2024  Specialist Appointment Date: None  Hospital Encounter and Summary Linked: Yes    Two attempts were made to reach patient within two business days after discharge. Voicemail left with contact information for patient to call back with any non-emergent questions or concerns.

## 2024-10-23 LAB
ATRIAL RATE: 87 BPM
P AXIS: 68 DEGREES
P OFFSET: 188 MS
P ONSET: 131 MS
PR INTERVAL: 190 MS
Q ONSET: 226 MS
QRS COUNT: 15 BEATS
QRS DURATION: 92 MS
QT INTERVAL: 356 MS
QTC CALCULATION(BAZETT): 428 MS
QTC FREDERICIA: 402 MS
R AXIS: 68 DEGREES
T AXIS: 69 DEGREES
T OFFSET: 404 MS
VENTRICULAR RATE: 87 BPM

## 2024-10-25 ENCOUNTER — TELEPHONE (OUTPATIENT)
Dept: INPATIENT UNIT | Facility: HOSPITAL | Age: 59
End: 2024-10-25
Payer: COMMERCIAL

## 2024-10-29 ENCOUNTER — APPOINTMENT (OUTPATIENT)
Dept: PRIMARY CARE | Facility: CLINIC | Age: 59
End: 2024-10-29
Payer: COMMERCIAL

## 2024-10-29 VITALS
OXYGEN SATURATION: 97 % | BODY MASS INDEX: 27.44 KG/M2 | SYSTOLIC BLOOD PRESSURE: 100 MMHG | DIASTOLIC BLOOD PRESSURE: 60 MMHG | TEMPERATURE: 97.2 F | HEART RATE: 125 BPM | WEIGHT: 170 LBS

## 2024-10-29 DIAGNOSIS — J18.9 PNEUMONIA DUE TO INFECTIOUS ORGANISM, UNSPECIFIED LATERALITY, UNSPECIFIED PART OF LUNG: Primary | ICD-10-CM

## 2024-10-29 DIAGNOSIS — I63.89 CEREBROVASCULAR ACCIDENT (CVA) DUE TO OTHER MECHANISM: ICD-10-CM

## 2024-10-29 DIAGNOSIS — Z23 NEED FOR PNEUMOCOCCAL VACCINATION: ICD-10-CM

## 2024-10-29 DIAGNOSIS — J44.1 CHRONIC OBSTRUCTIVE PULMONARY DISEASE WITH ACUTE EXACERBATION (MULTI): Primary | ICD-10-CM

## 2024-10-29 PROCEDURE — 3078F DIAST BP <80 MM HG: CPT | Performed by: FAMILY MEDICINE

## 2024-10-29 PROCEDURE — 90471 IMMUNIZATION ADMIN: CPT | Performed by: FAMILY MEDICINE

## 2024-10-29 PROCEDURE — 99213 OFFICE O/P EST LOW 20 MIN: CPT | Performed by: FAMILY MEDICINE

## 2024-10-29 PROCEDURE — 90677 PCV20 VACCINE IM: CPT | Performed by: FAMILY MEDICINE

## 2024-10-29 PROCEDURE — 3074F SYST BP LT 130 MM HG: CPT | Performed by: FAMILY MEDICINE

## 2024-10-29 RX ORDER — IPRATROPIUM BROMIDE AND ALBUTEROL SULFATE 2.5; .5 MG/3ML; MG/3ML
3 SOLUTION RESPIRATORY (INHALATION) 4 TIMES DAILY PRN
Qty: 360 ML | Refills: 11 | Status: SHIPPED | OUTPATIENT
Start: 2024-10-29 | End: 2025-10-29

## 2024-10-29 RX ORDER — PREGABALIN 300 MG/1
300 CAPSULE ORAL 2 TIMES DAILY
COMMUNITY
Start: 2024-06-12

## 2024-10-29 ASSESSMENT — ENCOUNTER SYMPTOMS
DIARRHEA: 0
EYE PAIN: 0
DIZZINESS: 0
FREQUENCY: 0
TROUBLE SWALLOWING: 0
JOINT SWELLING: 0
HEADACHES: 0
COUGH: 1
HALLUCINATIONS: 0
UNEXPECTED WEIGHT CHANGE: 0
SORE THROAT: 0
PALPITATIONS: 0
DECREASED CONCENTRATION: 0
NAUSEA: 0
FATIGUE: 0
DYSURIA: 0
ABDOMINAL PAIN: 0
VOMITING: 0
SHORTNESS OF BREATH: 1
CONFUSION: 0
HEMATURIA: 0
WEAKNESS: 0
BLOOD IN STOOL: 0
NUMBNESS: 0
FEVER: 0

## 2024-10-29 ASSESSMENT — PAIN SCALES - GENERAL: PAINLEVEL_OUTOF10: 8

## 2024-10-31 ENCOUNTER — PATIENT OUTREACH (OUTPATIENT)
Dept: PRIMARY CARE | Facility: CLINIC | Age: 59
End: 2024-10-31
Payer: COMMERCIAL

## 2024-11-04 ENCOUNTER — APPOINTMENT (OUTPATIENT)
Dept: PRIMARY CARE | Facility: CLINIC | Age: 59
End: 2024-11-04
Payer: COMMERCIAL

## 2024-12-15 RX ORDER — SODIUM CHLORIDE 9 MG/ML
10 INJECTION, SOLUTION INTRAMUSCULAR; INTRAVENOUS; SUBCUTANEOUS ONCE
Status: SHIPPED | OUTPATIENT
Start: 2024-12-16

## 2024-12-15 RX ORDER — PHENAZOPYRIDINE HYDROCHLORIDE 200 MG/1
200 TABLET, FILM COATED ORAL ONCE
Status: SHIPPED | OUTPATIENT
Start: 2024-12-16

## 2024-12-15 RX ORDER — LIDOCAINE HYDROCHLORIDE 10 MG/ML
5 INJECTION, SOLUTION INFILTRATION; PERINEURAL ONCE
Status: SHIPPED | OUTPATIENT
Start: 2024-12-16

## 2024-12-15 RX ORDER — LIDOCAINE HYDROCHLORIDE 20 MG/ML
1 JELLY TOPICAL ONCE
Status: SHIPPED | OUTPATIENT
Start: 2024-12-16

## 2024-12-15 RX ORDER — NITROFURANTOIN 25; 75 MG/1; MG/1
100 CAPSULE ORAL ONCE
Status: SHIPPED | OUTPATIENT
Start: 2024-12-16

## 2024-12-16 ENCOUNTER — PROCEDURE VISIT (OUTPATIENT)
Dept: OBSTETRICS AND GYNECOLOGY | Facility: CLINIC | Age: 59
End: 2024-12-16
Payer: COMMERCIAL

## 2024-12-16 VITALS
HEART RATE: 105 BPM | WEIGHT: 170.6 LBS | BODY MASS INDEX: 27.54 KG/M2 | SYSTOLIC BLOOD PRESSURE: 116 MMHG | DIASTOLIC BLOOD PRESSURE: 80 MMHG

## 2024-12-16 DIAGNOSIS — N39.0 RECURRENT UTI: Primary | ICD-10-CM

## 2024-12-16 DIAGNOSIS — N31.9 NEUROGENIC BLADDER: ICD-10-CM

## 2024-12-16 LAB
POC APPEARANCE, URINE: CLEAR
POC BILIRUBIN, URINE: NEGATIVE
POC BLOOD, URINE: NEGATIVE
POC COLOR, URINE: YELLOW
POC GLUCOSE, URINE: NEGATIVE MG/DL
POC KETONES, URINE: NEGATIVE MG/DL
POC LEUKOCYTES, URINE: ABNORMAL
POC NITRITE,URINE: POSITIVE
POC PH, URINE: 6 PH
POC PROTEIN, URINE: NEGATIVE MG/DL
POC SPECIFIC GRAVITY, URINE: 1.01
POC UROBILINOGEN, URINE: 0.2 EU/DL

## 2024-12-16 PROCEDURE — 87086 URINE CULTURE/COLONY COUNT: CPT | Performed by: OBSTETRICS & GYNECOLOGY

## 2024-12-16 PROCEDURE — 81003 URINALYSIS AUTO W/O SCOPE: CPT | Performed by: OBSTETRICS & GYNECOLOGY

## 2024-12-16 ASSESSMENT — PAIN SCALES - GENERAL: PAINLEVEL_OUTOF10: 0-NO PAIN

## 2024-12-16 NOTE — PROGRESS NOTES
HPI:  Pt here for intradetrusor Botox injection    150 U 8/2023  100 U 7/2024    The patient gave a urine sample which was checked for infection and found to be negative.  Pt was numbed for 20 min with lidojet inserted in to the bladder and given 100 mg po pyridium.    The patient was consented for cytoscopic intradetrusor Botox injection.        Units was reconstituted in     mL of NS  Using a flexible scope injection was performed at 4 sites using the Laborie needle at a 3 mm depth starting in the midline just above the intraureteric ridge and then to the left and right of this site and then just above until the entire drug volume was injected followed by 1 mL NS flush.    No bleeding was noted    The procedure was completed, the patient allowed to empty her bladder and get dressed.    Post-procedure precautions were given to the patient and macrobid 100 mg po BID x 3 days script sent.    She will follow-up with Kelsie Manzano in 1 month.    [facility], [place]

## 2024-12-19 ENCOUNTER — TELEPHONE (OUTPATIENT)
Dept: OBSTETRICS AND GYNECOLOGY | Facility: CLINIC | Age: 59
End: 2024-12-19
Payer: COMMERCIAL

## 2024-12-19 DIAGNOSIS — N39.0 ACUTE LOWER UTI: Primary | ICD-10-CM

## 2024-12-19 LAB — BACTERIA UR CULT: ABNORMAL

## 2024-12-19 RX ORDER — NITROFURANTOIN 25; 75 MG/1; MG/1
100 CAPSULE ORAL EVERY 12 HOURS SCHEDULED
Qty: 14 CAPSULE | Refills: 0 | Status: SHIPPED | OUTPATIENT
Start: 2024-12-19 | End: 2024-12-26

## 2024-12-19 NOTE — TELEPHONE ENCOUNTER
Result Communication    Resulted Orders   POCT UA Automated manually resulted   Result Value Ref Range    POC Color, Urine Yellow Straw, Yellow, Light-Yellow    POC Appearance, Urine Clear Clear    POC Glucose, Urine NEGATIVE NEGATIVE mg/dl    POC Bilirubin, Urine NEGATIVE NEGATIVE    POC Ketones, Urine NEGATIVE NEGATIVE mg/dl    POC Specific Gravity, Urine 1.010 1.005 - 1.035    POC Blood, Urine NEGATIVE NEGATIVE    POC PH, Urine 6.0 No Reference Range Established PH    POC Protein, Urine NEGATIVE NEGATIVE, 30 (1+) mg/dl    POC Urobilinogen, Urine 0.2 0.2, 1.0 EU/DL    Poc Nitrite, Urine POSITIVE (A) NEGATIVE    POC Leukocytes, Urine LARGE (3+) (A) NEGATIVE   Urine Culture   Result Value Ref Range    Urine Culture >=100,000 CFU/mL Klebsiella pneumoniae/variicola (A)        5:36 PM      Results were successfully communicated with the patient and they acknowledged their understanding.

## 2025-01-15 ENCOUNTER — APPOINTMENT (OUTPATIENT)
Dept: RADIOLOGY | Facility: HOSPITAL | Age: 60
End: 2025-01-15
Payer: COMMERCIAL

## 2025-01-15 ENCOUNTER — HOSPITAL ENCOUNTER (OUTPATIENT)
Facility: HOSPITAL | Age: 60
Setting detail: OBSERVATION
Discharge: HOME HEALTH | End: 2025-01-17
Attending: STUDENT IN AN ORGANIZED HEALTH CARE EDUCATION/TRAINING PROGRAM | Admitting: INTERNAL MEDICINE
Payer: COMMERCIAL

## 2025-01-15 ENCOUNTER — APPOINTMENT (OUTPATIENT)
Dept: CARDIOLOGY | Facility: HOSPITAL | Age: 60
End: 2025-01-15
Payer: COMMERCIAL

## 2025-01-15 DIAGNOSIS — R26.2 AMBULATORY DYSFUNCTION: ICD-10-CM

## 2025-01-15 DIAGNOSIS — R42 DIZZINESS: Primary | ICD-10-CM

## 2025-01-15 DIAGNOSIS — G89.4 CHRONIC PAIN SYNDROME: ICD-10-CM

## 2025-01-15 PROBLEM — J44.9 COPD (CHRONIC OBSTRUCTIVE PULMONARY DISEASE) (MULTI): Status: ACTIVE | Noted: 2024-03-21

## 2025-01-15 PROBLEM — R53.1 WEAKNESS: Status: ACTIVE | Noted: 2025-01-15

## 2025-01-15 PROBLEM — H55.00 NYSTAGMUS: Status: ACTIVE | Noted: 2025-01-15

## 2025-01-15 PROBLEM — Z86.73 HISTORY OF STROKE: Status: ACTIVE | Noted: 2025-01-15

## 2025-01-15 LAB
ALBUMIN SERPL BCP-MCNC: 4.4 G/DL (ref 3.4–5)
ALP SERPL-CCNC: 102 U/L (ref 33–136)
ALT SERPL W P-5'-P-CCNC: 16 U/L (ref 7–45)
ANION GAP SERPL CALCULATED.3IONS-SCNC: 12 MMOL/L (ref 10–20)
APPEARANCE UR: CLEAR
AST SERPL W P-5'-P-CCNC: 15 U/L (ref 9–39)
BASOPHILS # BLD AUTO: 0.09 X10*3/UL (ref 0–0.1)
BASOPHILS NFR BLD AUTO: 0.9 %
BILIRUB SERPL-MCNC: 0.5 MG/DL (ref 0–1.2)
BILIRUB UR STRIP.AUTO-MCNC: NEGATIVE MG/DL
BUN SERPL-MCNC: 12 MG/DL (ref 6–23)
CALCIUM SERPL-MCNC: 9.1 MG/DL (ref 8.6–10.3)
CARDIAC TROPONIN I PNL SERPL HS: <3 NG/L (ref 0–13)
CHLORIDE SERPL-SCNC: 104 MMOL/L (ref 98–107)
CHOLEST SERPL-MCNC: 101 MG/DL (ref 0–199)
CHOLESTEROL/HDL RATIO: 2.1
CK SERPL-CCNC: 39 U/L (ref 0–215)
CO2 SERPL-SCNC: 24 MMOL/L (ref 21–32)
COLOR UR: YELLOW
CREAT SERPL-MCNC: 0.81 MG/DL (ref 0.5–1.05)
EGFRCR SERPLBLD CKD-EPI 2021: 83 ML/MIN/1.73M*2
EOSINOPHIL # BLD AUTO: 0.04 X10*3/UL (ref 0–0.7)
EOSINOPHIL NFR BLD AUTO: 0.4 %
ERYTHROCYTE [DISTWIDTH] IN BLOOD BY AUTOMATED COUNT: 14.1 % (ref 11.5–14.5)
GLUCOSE SERPL-MCNC: 95 MG/DL (ref 74–99)
GLUCOSE UR STRIP.AUTO-MCNC: NORMAL MG/DL
HCT VFR BLD AUTO: 44.5 % (ref 36–46)
HDLC SERPL-MCNC: 48 MG/DL
HGB BLD-MCNC: 14.5 G/DL (ref 12–16)
IMM GRANULOCYTES # BLD AUTO: 0.04 X10*3/UL (ref 0–0.7)
IMM GRANULOCYTES NFR BLD AUTO: 0.4 % (ref 0–0.9)
KETONES UR STRIP.AUTO-MCNC: NEGATIVE MG/DL
LDLC SERPL CALC-MCNC: 39 MG/DL
LEUKOCYTE ESTERASE UR QL STRIP.AUTO: NEGATIVE
LYMPHOCYTES # BLD AUTO: 1.67 X10*3/UL (ref 1.2–4.8)
LYMPHOCYTES NFR BLD AUTO: 16.1 %
MAGNESIUM SERPL-MCNC: 1.65 MG/DL (ref 1.6–2.4)
MAGNESIUM SERPL-MCNC: 1.73 MG/DL (ref 1.6–2.4)
MCH RBC QN AUTO: 27.8 PG (ref 26–34)
MCHC RBC AUTO-ENTMCNC: 32.6 G/DL (ref 32–36)
MCV RBC AUTO: 85 FL (ref 80–100)
MONOCYTES # BLD AUTO: 0.81 X10*3/UL (ref 0.1–1)
MONOCYTES NFR BLD AUTO: 7.8 %
MUCOUS THREADS #/AREA URNS AUTO: NORMAL /LPF
NEUTROPHILS # BLD AUTO: 7.7 X10*3/UL (ref 1.2–7.7)
NEUTROPHILS NFR BLD AUTO: 74.4 %
NITRITE UR QL STRIP.AUTO: NEGATIVE
NON HDL CHOLESTEROL: 53 MG/DL (ref 0–149)
NRBC BLD-RTO: 0 /100 WBCS (ref 0–0)
PH UR STRIP.AUTO: 6 [PH]
PLATELET # BLD AUTO: 268 X10*3/UL (ref 150–450)
POTASSIUM SERPL-SCNC: 3.9 MMOL/L (ref 3.5–5.3)
PROT SERPL-MCNC: 6.9 G/DL (ref 6.4–8.2)
PROT UR STRIP.AUTO-MCNC: ABNORMAL MG/DL
RBC # BLD AUTO: 5.22 X10*6/UL (ref 4–5.2)
RBC # UR STRIP.AUTO: NEGATIVE /UL
RBC #/AREA URNS AUTO: NORMAL /HPF
SODIUM SERPL-SCNC: 136 MMOL/L (ref 136–145)
SP GR UR STRIP.AUTO: 1.02
SQUAMOUS #/AREA URNS AUTO: NORMAL /HPF
T4 FREE SERPL-MCNC: 0.91 NG/DL (ref 0.61–1.12)
TRIGL SERPL-MCNC: 69 MG/DL (ref 0–149)
TSH SERPL-ACNC: 0.31 MIU/L (ref 0.44–3.98)
UROBILINOGEN UR STRIP.AUTO-MCNC: ABNORMAL MG/DL
VLDL: 14 MG/DL (ref 0–40)
WBC # BLD AUTO: 10.4 X10*3/UL (ref 4.4–11.3)
WBC #/AREA URNS AUTO: NORMAL /HPF

## 2025-01-15 PROCEDURE — 2500000004 HC RX 250 GENERAL PHARMACY W/ HCPCS (ALT 636 FOR OP/ED): Performed by: PHYSICIAN ASSISTANT

## 2025-01-15 PROCEDURE — 96361 HYDRATE IV INFUSION ADD-ON: CPT

## 2025-01-15 PROCEDURE — 2500000001 HC RX 250 WO HCPCS SELF ADMINISTERED DRUGS (ALT 637 FOR MEDICARE OP): Performed by: INTERNAL MEDICINE

## 2025-01-15 PROCEDURE — 83735 ASSAY OF MAGNESIUM: CPT | Performed by: PHYSICIAN ASSISTANT

## 2025-01-15 PROCEDURE — 80053 COMPREHEN METABOLIC PANEL: CPT | Performed by: STUDENT IN AN ORGANIZED HEALTH CARE EDUCATION/TRAINING PROGRAM

## 2025-01-15 PROCEDURE — 84484 ASSAY OF TROPONIN QUANT: CPT | Performed by: INTERNAL MEDICINE

## 2025-01-15 PROCEDURE — 70547 MR ANGIOGRAPHY NECK W/O DYE: CPT

## 2025-01-15 PROCEDURE — G0378 HOSPITAL OBSERVATION PER HR: HCPCS

## 2025-01-15 PROCEDURE — 70450 CT HEAD/BRAIN W/O DYE: CPT | Performed by: RADIOLOGY

## 2025-01-15 PROCEDURE — 84443 ASSAY THYROID STIM HORMONE: CPT | Performed by: INTERNAL MEDICINE

## 2025-01-15 PROCEDURE — 93010 ELECTROCARDIOGRAM REPORT: CPT | Performed by: INTERNAL MEDICINE

## 2025-01-15 PROCEDURE — 83036 HEMOGLOBIN GLYCOSYLATED A1C: CPT | Mod: TRILAB | Performed by: INTERNAL MEDICINE

## 2025-01-15 PROCEDURE — 83735 ASSAY OF MAGNESIUM: CPT | Performed by: INTERNAL MEDICINE

## 2025-01-15 PROCEDURE — 96372 THER/PROPH/DIAG INJ SC/IM: CPT | Performed by: INTERNAL MEDICINE

## 2025-01-15 PROCEDURE — 2500000004 HC RX 250 GENERAL PHARMACY W/ HCPCS (ALT 636 FOR OP/ED): Performed by: INTERNAL MEDICINE

## 2025-01-15 PROCEDURE — 84439 ASSAY OF FREE THYROXINE: CPT | Performed by: INTERNAL MEDICINE

## 2025-01-15 PROCEDURE — 80061 LIPID PANEL: CPT | Performed by: INTERNAL MEDICINE

## 2025-01-15 PROCEDURE — 93005 ELECTROCARDIOGRAM TRACING: CPT

## 2025-01-15 PROCEDURE — 85025 COMPLETE CBC W/AUTO DIFF WBC: CPT | Performed by: STUDENT IN AN ORGANIZED HEALTH CARE EDUCATION/TRAINING PROGRAM

## 2025-01-15 PROCEDURE — 96375 TX/PRO/DX INJ NEW DRUG ADDON: CPT

## 2025-01-15 PROCEDURE — 84484 ASSAY OF TROPONIN QUANT: CPT | Performed by: PHYSICIAN ASSISTANT

## 2025-01-15 PROCEDURE — 99285 EMERGENCY DEPT VISIT HI MDM: CPT | Mod: 25 | Performed by: STUDENT IN AN ORGANIZED HEALTH CARE EDUCATION/TRAINING PROGRAM

## 2025-01-15 PROCEDURE — 2500000002 HC RX 250 W HCPCS SELF ADMINISTERED DRUGS (ALT 637 FOR MEDICARE OP, ALT 636 FOR OP/ED): Performed by: PHYSICIAN ASSISTANT

## 2025-01-15 PROCEDURE — 82607 VITAMIN B-12: CPT | Mod: TRILAB | Performed by: INTERNAL MEDICINE

## 2025-01-15 PROCEDURE — 70450 CT HEAD/BRAIN W/O DYE: CPT

## 2025-01-15 PROCEDURE — 85025 COMPLETE CBC W/AUTO DIFF WBC: CPT

## 2025-01-15 PROCEDURE — 81001 URINALYSIS AUTO W/SCOPE: CPT | Performed by: PHYSICIAN ASSISTANT

## 2025-01-15 PROCEDURE — 99223 1ST HOSP IP/OBS HIGH 75: CPT | Performed by: INTERNAL MEDICINE

## 2025-01-15 PROCEDURE — 70551 MRI BRAIN STEM W/O DYE: CPT

## 2025-01-15 PROCEDURE — 82550 ASSAY OF CK (CPK): CPT | Performed by: INTERNAL MEDICINE

## 2025-01-15 PROCEDURE — 70551 MRI BRAIN STEM W/O DYE: CPT | Performed by: RADIOLOGY

## 2025-01-15 PROCEDURE — 70544 MR ANGIOGRAPHY HEAD W/O DYE: CPT

## 2025-01-15 PROCEDURE — 36415 COLL VENOUS BLD VENIPUNCTURE: CPT

## 2025-01-15 PROCEDURE — 36415 COLL VENOUS BLD VENIPUNCTURE: CPT | Performed by: INTERNAL MEDICINE

## 2025-01-15 RX ORDER — ACETAMINOPHEN 650 MG/1
650 SUPPOSITORY RECTAL EVERY 4 HOURS PRN
Status: DISCONTINUED | OUTPATIENT
Start: 2025-01-15 | End: 2025-01-17 | Stop reason: HOSPADM

## 2025-01-15 RX ORDER — ENOXAPARIN SODIUM 100 MG/ML
40 INJECTION SUBCUTANEOUS EVERY 24 HOURS
Status: DISCONTINUED | OUTPATIENT
Start: 2025-01-15 | End: 2025-01-17 | Stop reason: HOSPADM

## 2025-01-15 RX ORDER — THIAMINE HYDROCHLORIDE 100 MG/ML
500 INJECTION, SOLUTION INTRAMUSCULAR; INTRAVENOUS 3 TIMES DAILY
Status: DISCONTINUED | OUTPATIENT
Start: 2025-01-15 | End: 2025-01-17 | Stop reason: HOSPADM

## 2025-01-15 RX ORDER — LISDEXAMFETAMINE DIMESYLATE 40 MG/1
40 CAPSULE ORAL
Status: DISCONTINUED | OUTPATIENT
Start: 2025-01-16 | End: 2025-01-15

## 2025-01-15 RX ORDER — ALBUTEROL SULFATE 90 UG/1
2 INHALANT RESPIRATORY (INHALATION) EVERY 4 HOURS PRN
Status: DISCONTINUED | OUTPATIENT
Start: 2025-01-15 | End: 2025-01-17 | Stop reason: HOSPADM

## 2025-01-15 RX ORDER — PANTOPRAZOLE SODIUM 40 MG/10ML
40 INJECTION, POWDER, LYOPHILIZED, FOR SOLUTION INTRAVENOUS
Status: DISCONTINUED | OUTPATIENT
Start: 2025-01-16 | End: 2025-01-17 | Stop reason: HOSPADM

## 2025-01-15 RX ORDER — DULOXETIN HYDROCHLORIDE 30 MG/1
30 CAPSULE, DELAYED RELEASE ORAL 2 TIMES DAILY
Status: DISCONTINUED | OUTPATIENT
Start: 2025-01-15 | End: 2025-01-17 | Stop reason: HOSPADM

## 2025-01-15 RX ORDER — IPRATROPIUM BROMIDE AND ALBUTEROL SULFATE 2.5; .5 MG/3ML; MG/3ML
3 SOLUTION RESPIRATORY (INHALATION) 4 TIMES DAILY PRN
Status: DISCONTINUED | OUTPATIENT
Start: 2025-01-15 | End: 2025-01-17 | Stop reason: HOSPADM

## 2025-01-15 RX ORDER — MECLIZINE HYDROCHLORIDE 25 MG/1
25 TABLET ORAL ONCE
Status: COMPLETED | OUTPATIENT
Start: 2025-01-15 | End: 2025-01-15

## 2025-01-15 RX ORDER — ASPIRIN 81 MG/1
81 TABLET ORAL DAILY
Status: DISCONTINUED | OUTPATIENT
Start: 2025-01-16 | End: 2025-01-17 | Stop reason: HOSPADM

## 2025-01-15 RX ORDER — FLUTICASONE FUROATE AND VILANTEROL 100; 25 UG/1; UG/1
1 POWDER RESPIRATORY (INHALATION)
Status: DISCONTINUED | OUTPATIENT
Start: 2025-01-16 | End: 2025-01-17 | Stop reason: HOSPADM

## 2025-01-15 RX ORDER — PANTOPRAZOLE SODIUM 40 MG/1
40 TABLET, DELAYED RELEASE ORAL
Status: DISCONTINUED | OUTPATIENT
Start: 2025-01-16 | End: 2025-01-17 | Stop reason: HOSPADM

## 2025-01-15 RX ORDER — HYDROXYZINE HYDROCHLORIDE 25 MG/1
50 TABLET, FILM COATED ORAL 2 TIMES DAILY PRN
Status: DISCONTINUED | OUTPATIENT
Start: 2025-01-15 | End: 2025-01-17 | Stop reason: HOSPADM

## 2025-01-15 RX ORDER — ACETAMINOPHEN 160 MG/5ML
650 SOLUTION ORAL EVERY 4 HOURS PRN
Status: DISCONTINUED | OUTPATIENT
Start: 2025-01-15 | End: 2025-01-17 | Stop reason: HOSPADM

## 2025-01-15 RX ORDER — ACETAMINOPHEN 325 MG/1
650 TABLET ORAL EVERY 4 HOURS PRN
Status: DISCONTINUED | OUTPATIENT
Start: 2025-01-15 | End: 2025-01-17 | Stop reason: HOSPADM

## 2025-01-15 RX ORDER — POLYETHYLENE GLYCOL 3350 17 G/17G
17 POWDER, FOR SOLUTION ORAL DAILY
Status: DISCONTINUED | OUTPATIENT
Start: 2025-01-15 | End: 2025-01-17 | Stop reason: HOSPADM

## 2025-01-15 RX ORDER — PREGABALIN 100 MG/1
200 CAPSULE ORAL 2 TIMES DAILY
Status: DISCONTINUED | OUTPATIENT
Start: 2025-01-15 | End: 2025-01-17 | Stop reason: HOSPADM

## 2025-01-15 RX ADMIN — MECLIZINE HYDROCHLORIDE 25 MG: 25 TABLET ORAL at 15:28

## 2025-01-15 RX ADMIN — THIAMINE HYDROCHLORIDE 500 MG: 100 INJECTION, SOLUTION INTRAMUSCULAR; INTRAVENOUS at 21:26

## 2025-01-15 RX ADMIN — ENOXAPARIN SODIUM 40 MG: 40 INJECTION SUBCUTANEOUS at 21:26

## 2025-01-15 RX ADMIN — DULOXETINE HYDROCHLORIDE 30 MG: 30 CAPSULE, DELAYED RELEASE ORAL at 21:27

## 2025-01-15 RX ADMIN — PREGABALIN 200 MG: 100 CAPSULE ORAL at 21:26

## 2025-01-15 RX ADMIN — SODIUM CHLORIDE 1000 ML: 900 INJECTION, SOLUTION INTRAVENOUS at 15:28

## 2025-01-15 RX ADMIN — HYDROXYZINE HYDROCHLORIDE 50 MG: 25 TABLET ORAL at 21:54

## 2025-01-15 SDOH — SOCIAL STABILITY: SOCIAL INSECURITY: ABUSE: ADULT

## 2025-01-15 SDOH — SOCIAL STABILITY: SOCIAL INSECURITY: WITHIN THE LAST YEAR, HAVE YOU BEEN HUMILIATED OR EMOTIONALLY ABUSED IN OTHER WAYS BY YOUR PARTNER OR EX-PARTNER?: NO

## 2025-01-15 SDOH — SOCIAL STABILITY: SOCIAL INSECURITY: WITHIN THE LAST YEAR, HAVE YOU BEEN AFRAID OF YOUR PARTNER OR EX-PARTNER?: NO

## 2025-01-15 SDOH — SOCIAL STABILITY: SOCIAL INSECURITY: HAVE YOU HAD THOUGHTS OF HARMING ANYONE ELSE?: NO

## 2025-01-15 SDOH — ECONOMIC STABILITY: INCOME INSECURITY: IN THE PAST 12 MONTHS HAS THE ELECTRIC, GAS, OIL, OR WATER COMPANY THREATENED TO SHUT OFF SERVICES IN YOUR HOME?: NO

## 2025-01-15 SDOH — SOCIAL STABILITY: SOCIAL INSECURITY: DOES ANYONE TRY TO KEEP YOU FROM HAVING/CONTACTING OTHER FRIENDS OR DOING THINGS OUTSIDE YOUR HOME?: NO

## 2025-01-15 SDOH — SOCIAL STABILITY: SOCIAL INSECURITY: HAVE YOU HAD ANY THOUGHTS OF HARMING ANYONE ELSE?: NO

## 2025-01-15 SDOH — SOCIAL STABILITY: SOCIAL INSECURITY: HAS ANYONE EVER THREATENED TO HURT YOUR FAMILY OR YOUR PETS?: NO

## 2025-01-15 SDOH — ECONOMIC STABILITY: FOOD INSECURITY: WITHIN THE PAST 12 MONTHS, THE FOOD YOU BOUGHT JUST DIDN'T LAST AND YOU DIDN'T HAVE MONEY TO GET MORE.: PATIENT DECLINED

## 2025-01-15 SDOH — ECONOMIC STABILITY: FOOD INSECURITY
WITHIN THE PAST 12 MONTHS, YOU WORRIED THAT YOUR FOOD WOULD RUN OUT BEFORE YOU GOT THE MONEY TO BUY MORE.: PATIENT DECLINED

## 2025-01-15 SDOH — SOCIAL STABILITY: SOCIAL INSECURITY: WERE YOU ABLE TO COMPLETE ALL THE BEHAVIORAL HEALTH SCREENINGS?: YES

## 2025-01-15 SDOH — SOCIAL STABILITY: SOCIAL INSECURITY: DO YOU FEEL UNSAFE GOING BACK TO THE PLACE WHERE YOU ARE LIVING?: NO

## 2025-01-15 SDOH — SOCIAL STABILITY: SOCIAL INSECURITY: DO YOU FEEL ANYONE HAS EXPLOITED OR TAKEN ADVANTAGE OF YOU FINANCIALLY OR OF YOUR PERSONAL PROPERTY?: NO

## 2025-01-15 SDOH — SOCIAL STABILITY: SOCIAL INSECURITY: ARE THERE ANY APPARENT SIGNS OF INJURIES/BEHAVIORS THAT COULD BE RELATED TO ABUSE/NEGLECT?: NO

## 2025-01-15 SDOH — SOCIAL STABILITY: SOCIAL INSECURITY: ARE YOU OR HAVE YOU BEEN THREATENED OR ABUSED PHYSICALLY, EMOTIONALLY, OR SEXUALLY BY ANYONE?: NO

## 2025-01-15 ASSESSMENT — ACTIVITIES OF DAILY LIVING (ADL)
HEARING - RIGHT EAR: FUNCTIONAL
BATHING: INDEPENDENT
GROOMING: INDEPENDENT
HEARING - LEFT EAR: FUNCTIONAL
PATIENT'S MEMORY ADEQUATE TO SAFELY COMPLETE DAILY ACTIVITIES?: YES
LACK_OF_TRANSPORTATION: NO
ADEQUATE_TO_COMPLETE_ADL: YES
DRESSING YOURSELF: INDEPENDENT
FEEDING YOURSELF: INDEPENDENT
JUDGMENT_ADEQUATE_SAFELY_COMPLETE_DAILY_ACTIVITIES: YES
WALKS IN HOME: INDEPENDENT
TOILETING: INDEPENDENT

## 2025-01-15 ASSESSMENT — COGNITIVE AND FUNCTIONAL STATUS - GENERAL
CLIMB 3 TO 5 STEPS WITH RAILING: A LOT
CLIMB 3 TO 5 STEPS WITH RAILING: A LOT
WALKING IN HOSPITAL ROOM: A LITTLE
DAILY ACTIVITIY SCORE: 24
PATIENT BASELINE BEDBOUND: NO
WALKING IN HOSPITAL ROOM: A LITTLE
MOBILITY SCORE: 19
STANDING UP FROM CHAIR USING ARMS: A LITTLE
MOBILITY SCORE: 19
MOVING TO AND FROM BED TO CHAIR: A LITTLE
STANDING UP FROM CHAIR USING ARMS: A LITTLE
MOVING TO AND FROM BED TO CHAIR: A LITTLE
DAILY ACTIVITIY SCORE: 24

## 2025-01-15 ASSESSMENT — ENCOUNTER SYMPTOMS
MUSCULOSKELETAL NEGATIVE: 1
WEAKNESS: 1
GASTROINTESTINAL NEGATIVE: 1
ALLERGIC/IMMUNOLOGIC NEGATIVE: 1
RESPIRATORY NEGATIVE: 1
ENDOCRINE NEGATIVE: 1
CARDIOVASCULAR NEGATIVE: 1
HEMATOLOGIC/LYMPHATIC NEGATIVE: 1
PSYCHIATRIC NEGATIVE: 1
EYES NEGATIVE: 1
CONSTITUTIONAL NEGATIVE: 1

## 2025-01-15 ASSESSMENT — PATIENT HEALTH QUESTIONNAIRE - PHQ9
SUM OF ALL RESPONSES TO PHQ9 QUESTIONS 1 & 2: 0
2. FEELING DOWN, DEPRESSED OR HOPELESS: NOT AT ALL
1. LITTLE INTEREST OR PLEASURE IN DOING THINGS: NOT AT ALL

## 2025-01-15 ASSESSMENT — LIFESTYLE VARIABLES
HOW OFTEN DO YOU HAVE A DRINK CONTAINING ALCOHOL: MONTHLY OR LESS
AUDIT-C TOTAL SCORE: 2
SKIP TO QUESTIONS 9-10: 0
HOW MANY STANDARD DRINKS CONTAINING ALCOHOL DO YOU HAVE ON A TYPICAL DAY: 1 OR 2
AUDIT-C TOTAL SCORE: 2
HOW OFTEN DO YOU HAVE 6 OR MORE DRINKS ON ONE OCCASION: LESS THAN MONTHLY

## 2025-01-15 ASSESSMENT — PAIN - FUNCTIONAL ASSESSMENT
PAIN_FUNCTIONAL_ASSESSMENT: 0-10
PAIN_FUNCTIONAL_ASSESSMENT: 0-10

## 2025-01-15 ASSESSMENT — PAIN DESCRIPTION - ONSET: ONSET: AWAKENED FROM SLEEP

## 2025-01-15 ASSESSMENT — PAIN DESCRIPTION - FREQUENCY: FREQUENCY: CONSTANT/CONTINUOUS

## 2025-01-15 ASSESSMENT — PAIN SCALES - GENERAL
PAINLEVEL_OUTOF10: 0 - NO PAIN
PAINLEVEL_OUTOF10: 0 - NO PAIN

## 2025-01-15 ASSESSMENT — PAIN DESCRIPTION - PROGRESSION: CLINICAL_PROGRESSION: NOT CHANGED

## 2025-01-15 NOTE — H&P
History Of Present Illness  Mona Sanders is a 59 y.o. female presenting with weakness inability to walk.  This patient presents to the emergency room with difficulty ambulation.  She has had longstanding issues with some double vision and coordination after she had a stroke 5 years ago.  Ever since [place] time however she started to experience significant weakness discoordination.  She lives with her father and basically says that for the last week she has been unable to walk as she hits things when she walks due to poor vision and coordination and overall weakness.  She has had issues with her gaze and eyes for a long time and this has been treated by pain management physician with Lyrica which has been increased to 300 mg twice a day fairly recently.  Emergency room CT of the brain was negative initial neuroexam by the ER physician was negative Case was discussed with neurology who recommended that she was discharged or if she stays to have an MRI.  The patient is a very good historian but basically she denies any recent viral illness denies any fever denies any headache denies any particular neck pain or back pain denies any urinary or bladder incontinence or rectal incontinence denies any skin rashes or symptoms  Past Medical History  She has a past medical history of Acute exacerbation of chronic obstructive pulmonary disease (Multi) (03/21/2024), Anxiety, Colon cancer screening (07/30/2024), Depression, FHx: atrial fibrillation, History of anemia, HLD (hyperlipidemia), Hypertension, Incontinence, Ingrown toenail of left foot, Iron deficiency anemia, Nephrolithiasis, Nicotine dependence, OAB (overactive bladder), Opiate dependence, Pain in left hip (04/13/2022), Right knee pain (06/02/2023), Rotator cuff injury, left, sequela, SOB (shortness of breath), Spondylosis of cervical spine, Spondylosis of lumbar spine, Stroke (cerebrum), Transient cerebral ischemic attack (10/14/2021), and Tubal pregnancy  ([facility]).  Reviewed  Surgical History  She has a past surgical history that includes Hip Arthroplasty (08/31/2020); MR angio head wo IV contrast (06/02/2020); MR angio neck wo IV contrast (06/02/2020); CT angio neck (10/14/2021); CT angio head w and wo IV contrast (10/14/2021); MR angio head wo IV contrast (08/10/2022); MR angio neck wo IV contrast (08/10/2022); MR angio head wo IV contrast (06/24/2019); Ureterolithotomy; and Ectopic pregnancy surgery.  Reviewed  Social History  She reports that she has been smoking cigarettes. She has never used smokeless tobacco. She reports that she does not drink alcohol and does not use drugs.  Reviewed denies drinking admits to occasional glass of wine; she does smoke  Family History  Family History   Problem Relation Name Age of Onset    Atrial fibrillation Mother      Diabetes Father      Hypertension Father          Allergies  Tramadol and Wellbutrin [bupropion hcl]    ROS  Review of Systems   Constitutional: Negative.    HENT: Negative.     Eyes: Negative.    Respiratory: Negative.     Cardiovascular: Negative.    Gastrointestinal: Negative.    Endocrine: Negative.    Genitourinary: Negative.    Musculoskeletal: Negative.    Skin: Negative.    Allergic/Immunologic: Negative.    Neurological:  Positive for weakness.   Hematological: Negative.    Psychiatric/Behavioral: Negative.     All other systems reviewed and are negative.       Last Recorded Vitals  BP (!) 133/91 (BP Location: Right arm, Patient Position: Sitting)   Pulse 91   Temp 36.4 °C (97.5 °F) (Oral)   Resp 16   Wt 77 kg (169 lb 12.1 oz)   SpO2 (!) 93%     Physical Exam  I saw this patient emergency room bed 7.  This is a  female who is alert oriented x 3 cooperative.  She is normocephalic atraumatic extraocular movements are intact but she does have nystagmus when she looks to the right or left.  PERRLA  Neck supple no meningeal signs no JVD  Chest clear  Heart regular S1-S2 distant  Abdomen soft  nontender  Extremities there is no peripheral edema good pulses  Neurologic examination cranial nerves speech normal  Motor strength this 5 out of 5 bilateral she is somewhat dysmetric though there are bilateral patellar reflexes  Skin warm dry  Psych no active psychosis    Relevant Results  Imaging labs EKG reviewed    ASSESSMENT/PLAN  Assessment/Plan   Assessment & Plan  Dizziness    COPD (chronic obstructive pulmonary disease) (Multi)    Weakness    Ambulatory dysfunction    Nystagmus    History of stroke    Chronic pain syndrome    January 15, case was discussed with neurologist Dr. Llamas over the phone; my impression is that she truly has an issue with ambulation and coordination.  I am not 100% certain of how weak she is.  Anyway will decrease Lyrica dose will stop Lipitor check CK levels will order MRI of her brain to rule out any new stroke PT OT speech eval.  Further recommendations to follow based on clinical course and results       Yo Enamorado MD

## 2025-01-15 NOTE — DISCHARGE INSTRUCTIONS
Thank you for choosing [facility] for your healthcare needs today!  There were no acute findings on your diagnostic workup today that identified the etiology of your symptoms.  A neurologist was consulted, and it is recommended that you follow-up with them outpatient.  Their referral information has been provided for you.  Please call to schedule an appointment with them as soon as you are able to.  Return to the emergency department should you develop new or worsening symptoms that are concerning to you.    From Dr. Enamorado, your hospitalist:  I am holding your Lipitor as it may or may not be related to your weakness and difficulty ambulation.  Please check with your primary care physician during follow-up if safe to resume.    I have decreased your Lyrica dose as per my discussion with the consulting neurologist and I have issued a prescription for 200 mg twice a day    I have also started oral B12 vitamin as well as B1 vitamin

## 2025-01-15 NOTE — ED PROVIDER NOTES
Patient was seen by both myself and advanced practitioner.  I personally saw the patient and made/approved the management plan and take responsibility for the patient management     Patient is a 59-year-old female with a history of COPD, prior CVA that presents emergency department for evaluation of unsteadiness, dizziness and lightheadedness.  Patient states that symptoms for started roughly 2 weeks ago after she was sick with a gastrointestinal infection.  She states since that time she has been feeling very unsteady, lightheaded.  She states that she has had difficulty walking and has had to hold herself up using surrounding objects.  She states that she is fallen even just trying to make her bed.  She denies hitting her head or loss of consciousness.  She states that her previous stroke was 5 years ago and has since had double vision which she states is similar to previous.    On exam patient awake, alert and oriented.  She is moving all extremities equally with no obvious focal motor deficit however is very unsteady and ataxic on ambulation.  Extraocular eye movements intact the patient does have full field of vision.  Lungs are clear to auscultation bilaterally.  Regular rate and rhythm on auscultation of the heart.  Patient does have an NIH stroke score of 2 given her unsteadiness and ataxic gait however patient symptoms are going on for 2 weeks and patient outside of any window for TNK.  CT scan brain shows no evidence of intracranial hemorrhage but does show encephalomalacia with a prior infarct but no acute infarct seen.  Initially blood work unremarkable including normal x-rays, normal kidney function.  Case was initially discussed by advance petitioner with neurology who recommended that if patient is able to ambulate she can be discharged home with outpatient follow-up.  Patient feeling very unsteady and is unable to take care of herself with a very unsteady ambulation gait and will be brought in for  observation and further evaluation by physical therapy.  I did discuss case with hospitalist who accepted patient for admission.    I independently interpreted the following study(s) EKG, CT scan of the head.  EKG shows normal sinus rhythm with no evidence of STEMI or active ischemia.  CT scan of the brain shows encephalomalacia with prior infarct but no evidence of acute infarct or intracranial hemorrhage.       Marcus Temple, DO  01/15/25 1195

## 2025-01-15 NOTE — ED PROVIDER NOTES
HPI   Chief Complaint   Patient presents with    Dizziness     Dizziness for past two weeks also off balance       HPI    Patient is a 59-year-old female with a history of COPD and CVA approximately 5 years ago presenting for evaluation of feelings of off balance for approximately 2 weeks.  Patient states that she had some sort of GI infection several weeks ago and believes this is when her symptoms started.  She denies dizziness or room spinning sensation, stating that she just feels off balance and feels as if she needs to hold the walls to help her walk.  She states that since her stroke 5 years ago she has struggled with double vision and has seen a specialist for this.  She states that over the past several weeks she does feel as if her double vision is worse.  She denies any focal numbness or weakness.  She denies trauma to the head or neck.  She denies chest pain or shortness of breath.  No abdominal pain, nausea, vomiting, diarrhea or constipation.  No dysuria or hematuria.    Patient History   Past Medical History:   Diagnosis Date    Acute exacerbation of chronic obstructive pulmonary disease (Multi) 03/21/2024    Anxiety     Colon cancer screening 07/30/2024    Depression     FHx: atrial fibrillation     Mother/Patient denies any personal history.    History of anemia     HLD (hyperlipidemia)     Hypertension     Incontinence     Ingrown toenail of left foot     Chronic pain    Iron deficiency anemia     Nephrolithiasis     PSH Stone extraction    Nicotine dependence     OAB (overactive bladder)     Neuromuscular dysfunction of bladder    Opiate dependence     Pain in left hip 04/13/2022    Left hip pain    Right knee pain 06/02/2023    Rotator cuff injury, left, sequela     SOB (shortness of breath)     Spondylosis of cervical spine     Spondylosis of lumbar spine     Stroke (cerebrum)     Right thalmic/Residual vision issues.    Transient cerebral ischemic attack 10/14/2021    Tubal pregnancy ([facility]      [facility]     Past Surgical History:   Procedure Laterality Date    CT ANGIO NECK  10/14/2021    CT NECK ANGIO W AND WO IV CONTRAST 10/14/2021 [facility] CLINICAL LEGACY    CT HEAD ANGIO W AND WO IV CONTRAST  10/14/2021    CT HEAD ANGIO W AND WO IV CONTRAST 10/14/2021 [facility] CLINICAL LEGACY    ECTOPIC PREGNANCY SURGERY      HIP ARTHROPLASTY  08/31/2020    MR HEAD ANGIO WO IV CONTRAST  06/02/2020    MR HEAD ANGIO WO IV CONTRAST 6/2/2020 [facility] CLINICAL LEGACY    MR HEAD ANGIO WO IV CONTRAST  08/10/2022    MR HEAD ANGIO WO IV CONTRAST 8/10/2022 [facility] CLINICAL LEGACY    MR HEAD ANGIO WO IV CONTRAST  06/24/2019    MR HEAD ANGIO WO IV CONTRAST [facility] INPATIENT LEGAC[facility]    MR NECK ANGIO WO IV CONTRAST  06/02/2020    MR NECK ANGIO WO IV CONTRAST 6/2/2020 [facility] CLINICAL LEGACY    MR NECK ANGIO WO IV CONTRAST  08/10/2022    MR NECK ANGIO WO IV CONTRAST 8/10/2022 [facility] CLINICAL LEGACY    URETEROLITHOTOMY       Family History   Problem Relation Name Age of Onset    Atrial fibrillation Mother      Diabetes Father      Hypertension Father       Social History     Tobacco Use    Smoking status: Every Day     Types: Cigarettes    Smokeless tobacco: Never   Substance Use Topics    Alcohol use: Never    Drug use: Never       Physical Exam   ED Triage Vitals [01/15/25 1449]   Temperature Heart Rate Respirations BP   36.4 °C (97.5 °F) 91 16 (!) 133/91      Pulse Ox Temp Source Heart Rate Source Patient Position   (!) 93 % Oral Monitor Sitting      BP Location FiO2 (%)     Right arm --       Physical Exam  Vitals and nursing note reviewed.   Constitutional:       Appearance: Normal appearance.   HENT:      Head: Normocephalic and atraumatic.   Eyes:      Extraocular Movements: Extraocular movements intact.      Pupils: Pupils are equal, round, and reactive to light.   Cardiovascular:      Rate and Rhythm: Normal rate and regular rhythm.   Pulmonary:      Effort: Pulmonary effort is normal.      Breath sounds: Normal breath sounds.   Abdominal:       General: Abdomen is flat. Bowel sounds are normal.      Palpations: Abdomen is soft.   Skin:     General: Skin is warm and dry.   Neurological:      General: No focal deficit present.      Mental Status: She is alert and oriented to person, place, and time.      Comments: NIHSS of 0.  Test of skew negative.  Romberg test negative.           ED Course & MDM   ED Course as of 01/15/25 1729   Wed Edgardo 15, 2025   1504 EKG Time:1454  EKG Interpretation time:1504  EKG Interpretation: EKG shows normal sinus rhythm with a rate of 78 bpm, normal axis, QTc normal at 433, no evidence of STEMI or active ischemia.    EKG was interpreted by myself independently [JL]   1614 Patient was updated on her lab results CT head at this time.  Patient did state that she would be able to supply a urine sample.  I discussed with the patient that while her CAT scan was negative for acute infarct, it may be recommended that she stay in the hospital for further MRI imaging and evaluation by a neurologist.  Patient was understanding and did feel comfortable to stay in the hospital if it was recommended. [RP]      ED Course User Index  [JL] Marcus Temple DO  [RP] Amy Shankar PA-C         Diagnoses as of 01/15/25 1729   Dizziness                 No data recorded     [place] Coma Scale Score: 15 (01/15/25 1458 : Diana Gonzales RN)       NIH Stroke Scale: 2 (01/15/25 1725 : Marcus Temple DO)                   Medical Decision Making    Parts of this chart have been completed using voice recognition software. Please excuse any errors of transcription. Despite the medical decision making time stamp above-my medical decision making has taken place during the patient's entire visit. My thought process and reason for plan has been formulated from the time that I saw the patient until the time of disposition and is not specific to one specific moment during their visit and furthermore my MDM encompasses this entire chart and not only this text  box.      HPI: Detailed above.    Exam: A medically appropriate exam performed, outlined above, given the known history and presentation.    History obtained from: Patient    Social Determinants of Health considered during this visit: From home    EKG interpreted by my attending physician, reviewed by myself.    Labs Reviewed   CBC WITH AUTO DIFFERENTIAL - Abnormal       Result Value    WBC 10.4      nRBC 0.0      RBC 5.22 (*)     Hemoglobin 14.5      Hematocrit 44.5      MCV 85      MCH 27.8      MCHC 32.6      RDW 14.1      Platelets 268      Neutrophils % 74.4      Immature Granulocytes %, Automated 0.4      Lymphocytes % 16.1      Monocytes % 7.8      Eosinophils % 0.4      Basophils % 0.9      Neutrophils Absolute 7.70      Immature Granulocytes Absolute, Automated 0.04      Lymphocytes Absolute 1.67      Monocytes Absolute 0.81      Eosinophils Absolute 0.04      Basophils Absolute 0.09     URINALYSIS WITH REFLEX CULTURE AND MICROSCOPIC - Abnormal    Color, Urine Yellow      Appearance, Urine Clear      Specific Gravity, Urine 1.023      pH, Urine 6.0      Protein, Urine 20 (TRACE)      Glucose, Urine Normal      Blood, Urine NEGATIVE      Ketones, Urine NEGATIVE      Bilirubin, Urine NEGATIVE      Urobilinogen, Urine 2 (1+) (*)     Nitrite, Urine NEGATIVE      Leukocyte Esterase, Urine NEGATIVE     COMPREHENSIVE METABOLIC PANEL - Normal    Glucose 95      Sodium 136      Potassium 3.9      Chloride 104      Bicarbonate 24      Anion Gap 12      Urea Nitrogen 12      Creatinine 0.81      eGFR 83      Calcium 9.1      Albumin 4.4      Alkaline Phosphatase 102      Total Protein 6.9      AST 15      Bilirubin, Total 0.5      ALT 16     MAGNESIUM - Normal    Magnesium 1.73     TROPONIN I, HIGH SENSITIVITY - Normal    Troponin I, High Sensitivity <3      Narrative:     Less than 99th percentile of normal range cutoff-  Female and children under 18 years old <14 ng/L; Male <21 ng/L: Negative  Repeat testing should  be performed if clinically indicated.     Female and children under 18 years old 14-50 ng/L; Male 21-50 ng/L:  Consistent with possible cardiac damage and possible increased clinical   risk. Serial measurements may help to assess extent of myocardial damage.     >50 ng/L: Consistent with cardiac damage, increased clinical risk and  myocardial infarction. Serial measurements may help assess extent of   myocardial damage.      NOTE: Children less than 1 year old may have higher baseline troponin   levels and results should be interpreted in conjunction with the overall   clinical context.     NOTE: Troponin I testing is performed using a different   testing methodology at [facility] than at other   [facility]. Direct result comparisons should only   be made within the same method.   URINALYSIS WITH REFLEX CULTURE AND MICROSCOPIC    Narrative:     The following orders were created for panel order Urinalysis with Reflex Culture and Microscopic.  Procedure                               Abnormality         Status                     ---------                               -----------         ------                     Urinalysis with Reflex C...[[id number]]  Abnormal            Final result               Extra Urine Gray Tube[[id number]]                                                         Please view results for these tests on the individual orders.   EXTRA URINE GRAY TUBE   URINALYSIS MICROSCOPIC WITH REFLEX CULTURE    WBC, Urine 1-5      RBC, Urine 1-2      Squamous Epithelial Cells, Urine 1-9 (SPARSE)      Mucus, Urine 3+       CT head wo IV contrast   Final Result   Minimal chronic ischemic changes and encephalomalacia in the right   frontal lobe related to remote infarct or remote injury again noted,   without acute intracranial process.        Signed by: Oneil Rosado 1/15/2025 4:04 PM   Dictation workstation:   [id number]        Medications   sodium chloride 0.9 % bolus 1,000 mL (1,000 mL  intravenous New Bag 1/15/25 1528)   meclizine (Antivert) tablet 25 mg (25 mg oral Given 1/15/25 1528)     Differential diagnoses considered for this visit include: Peripheral vertigo versus central vertigo versus TIA versus CVA versus electrolyte imbalance versus metabolic disturbance    Considerations/further MDM:    Patient is a 59-year-old female with a history of COPD and CVA approximately 5 years ago presenting for evaluation of feelings of off balance with double vision over the past 2 weeks.  Vital signs are hemodynamically stable.  Patient had a normal neurologic examination with an NIHSS of 0, negative test of skew and negative Romberg test.  Given patient's history of CVA, CT head was obtained with lab work.  Fluids and meclizine were administered for attempted relief of her symptoms.  CBC showed no evidence of leukocytosis or anemia.  CMP demonstrated balanced electrolytes with normal liver and kidney function.  Initial troponin was less than 3.  Magnesium was within normal limits.  Urinalysis was negative for infection. EKG was interpreted by my attending physician as normal sinus rhythm with a rate of 70 bpm.  No evidence of STEMI or active ischemia. CT head without contrast showed minimal chronic ischemic changes and encephalomalacia in the right frontal lobe related to remote infarct or remote injury again noted without acute intracranial process.  I consulted with on-call neurologist Dr. Esperanza Llamas, who stated that she did not feel it was necessary to admit the patient for further workup at this time given the duration of her symptoms and ability for patient to ambulate without difficulty.  She stated that this was suitable for an outpatient follow-up.  My attending physician discussed this recommendation with the patient.  Patient relayed to my attending physician that she did not feel comfortable to return home.  She states that she feels too unsteady and does not feel as if she can take care of  herself.  It was discussed with the patient that she would be admitted for rehab.  Patient was okay with this and felt comfortable to be admitted. My attending physician spoke to the on-call hospitalist, and the patient was admitted to the hospital for further management.    All of the patient's questions were answered to the best of my ability. Patient states understanding that they have been screened for an emergency today, and we have not found any etiology of symptoms that requires emergent treatment or admission to the hospital at this point. They understand that they have not had definitive care day and require follow-up for treatment of their condition. They also state understanding that they may have an emergent condition that may potentially have not of detected at this visit and they must return to the emergency department if they develop any worsening of symptoms or new complaints.    Patient was seen in conjunction with attending physician Dr. Marcus Temple   Patient's history, physical exam, diagnostic studies, and treatment plan were discussed thoroughly.  Procedure  Procedures     Amy Shankar PA-C  01/15/25 0314       Amy Shankar PA-C  01/15/25 8132

## 2025-01-15 NOTE — PROGRESS NOTES
Pharmacy Medication History Review    Mona Sanders is a 59 y.o. female admitted for Dizziness. Pharmacy reviewed the patient's [id number] medications and allergies for accuracy.    Medications ADDED:  Multivitamin chewable gummies  Medications CHANGED:  Atorvastatin 40 mg daily -> nightly  Duloxetine 30 mg daily -> BID  Hydroxyzine 50 mg BID -> TID PRN  Medications REMOVED:   Pregabalin 150 mg     The list below reflects the updated PTA list.   Prior to Admission Medications   Prescriptions Last Dose Informant Patient Reported? Taking?   DULoxetine (Cymbalta) 30 mg DR capsule 1/15/2025  Yes Yes   Sig: Take 1 capsule (30 mg) by mouth 2 times a day. Do not crush or chew.   NON FORMULARY Past Week  Yes Yes   Sig: Take 2 each by mouth once daily. Abdulkadir multivitamin chewable gummies; pt chews two gummies every morning   Vyvanse 40 mg capsule 1/15/2025  Yes Yes   Sig: Take 1 capsule (40 mg) by mouth once daily in the morning. Take before meals.   albuterol (Ventolin HFA) 90 mcg/actuation inhaler Past Week  No Yes   Sig: Inhale 1 to 2 puffs by mouth and into the lungs every 4 to 6 hours if needed.   aspirin 81 mg EC tablet 1/15/2025  Yes Yes   Sig: Take 1 tablet (81 mg) by mouth once daily.   atorvastatin (Lipitor) 40 mg tablet 1/14/2025  No Yes   Sig: take 1 tablet by mouth once daily   Patient taking differently: Take 1 tablet (40 mg) by mouth once daily at bedtime.   budesonide-formoteroL (Symbicort) 80-4.5 mcg/actuation inhaler 1/15/2025  No Yes   Sig: Inhale 2 puffs 2 times a day. Rinse mouth with water after use to reduce aftertaste and incidence of candidiasis. Do not swallow.   hydrOXYzine HCL (Atarax) 50 mg tablet Past Week  Yes Yes   Sig: Take 1 tablet (50 mg) by mouth 3 times a day as needed for anxiety.   ipratropium-albuteroL (Duo-Neb) 0.5-2.5 mg/3 mL nebulizer solution Past Week  No Yes   Sig: Take 3 mL by nebulization 4 times a day as needed for wheezing or shortness of breath.   pregabalin  (Lyrica) 150 mg capsule   No No   Sig: Take 1 capsule (150 mg) by mouth 3 times a day.   Patient taking differently: Take 2 capsules (300 mg) by mouth 2 times a day.   pregabalin (Lyrica) 300 mg capsule 1/15/2025  Yes Yes   Sig: Take 1 capsule (300 mg) by mouth 2 times a day.      Facility-Administered Medications: None        The list below reflects the updated allergy list. Please review each documented allergy for additional clarification and justification.  Allergies  Reviewed by Scott Terry RPh on 1/15/2025        Severity Reactions Comments    Tramadol High Hallucinations, Psychosis     Wellbutrin [bupropion Hcl] High Psychosis Suicide attempt            Patient declines M2B at discharge.     Sources:   Pharmacy dispense history  Patient interview Good historian     Additional Comments:  Pt is no longer taking pregabalin 150 mg; replaced with 300 mg strength (1 capsule BID) March 2024.      Scott Terry RPh  Clinical Pharmacist  01/15/25

## 2025-01-15 NOTE — CARE PLAN
The patient's goals for the shift include  no more dizziness    The clinical goals for the shift include MRI

## 2025-01-16 ENCOUNTER — APPOINTMENT (OUTPATIENT)
Dept: CARDIOLOGY | Facility: HOSPITAL | Age: 60
End: 2025-01-16
Payer: COMMERCIAL

## 2025-01-16 LAB
ALBUMIN SERPL BCP-MCNC: 3.7 G/DL (ref 3.4–5)
ALP SERPL-CCNC: 93 U/L (ref 33–136)
ALT SERPL W P-5'-P-CCNC: 16 U/L (ref 7–45)
ANION GAP SERPL CALCULATED.3IONS-SCNC: 11 MMOL/L (ref 10–20)
AST SERPL W P-5'-P-CCNC: 16 U/L (ref 9–39)
ATRIAL RATE: 78 BPM
BASOPHILS # BLD AUTO: 0.1 X10*3/UL (ref 0–0.1)
BASOPHILS NFR BLD AUTO: 1.1 %
BILIRUB SERPL-MCNC: 0.3 MG/DL (ref 0–1.2)
BUN SERPL-MCNC: 12 MG/DL (ref 6–23)
CALCIUM SERPL-MCNC: 8.4 MG/DL (ref 8.6–10.3)
CHLORIDE SERPL-SCNC: 109 MMOL/L (ref 98–107)
CK SERPL-CCNC: 33 U/L (ref 0–215)
CO2 SERPL-SCNC: 22 MMOL/L (ref 21–32)
CREAT SERPL-MCNC: 0.7 MG/DL (ref 0.5–1.05)
EGFRCR SERPLBLD CKD-EPI 2021: >90 ML/MIN/1.73M*2
EOSINOPHIL # BLD AUTO: 0.07 X10*3/UL (ref 0–0.7)
EOSINOPHIL NFR BLD AUTO: 0.8 %
ERYTHROCYTE [DISTWIDTH] IN BLOOD BY AUTOMATED COUNT: 14.3 % (ref 11.5–14.5)
EST. AVERAGE GLUCOSE BLD GHB EST-MCNC: 111 MG/DL
GLUCOSE SERPL-MCNC: 129 MG/DL (ref 74–99)
HBA1C MFR BLD: 5.5 %
HCT VFR BLD AUTO: 39.8 % (ref 36–46)
HGB BLD-MCNC: 12.8 G/DL (ref 12–16)
IMM GRANULOCYTES # BLD AUTO: 0.04 X10*3/UL (ref 0–0.7)
IMM GRANULOCYTES NFR BLD AUTO: 0.5 % (ref 0–0.9)
LYMPHOCYTES # BLD AUTO: 2.14 X10*3/UL (ref 1.2–4.8)
LYMPHOCYTES NFR BLD AUTO: 24.4 %
MCH RBC QN AUTO: 27.4 PG (ref 26–34)
MCHC RBC AUTO-ENTMCNC: 32.2 G/DL (ref 32–36)
MCV RBC AUTO: 85 FL (ref 80–100)
MONOCYTES # BLD AUTO: 0.61 X10*3/UL (ref 0.1–1)
MONOCYTES NFR BLD AUTO: 7 %
NEUTROPHILS # BLD AUTO: 5.8 X10*3/UL (ref 1.2–7.7)
NEUTROPHILS NFR BLD AUTO: 66.2 %
NRBC BLD-RTO: 0 /100 WBCS (ref 0–0)
P AXIS: 23 DEGREES
P OFFSET: 185 MS
P ONSET: 138 MS
PLATELET # BLD AUTO: 225 X10*3/UL (ref 150–450)
POTASSIUM SERPL-SCNC: 4 MMOL/L (ref 3.5–5.3)
PR INTERVAL: 178 MS
PROT SERPL-MCNC: 5.5 G/DL (ref 6.4–8.2)
Q ONSET: 227 MS
QRS COUNT: 13 BEATS
QRS DURATION: 82 MS
QT INTERVAL: 380 MS
QTC CALCULATION(BAZETT): 433 MS
QTC FREDERICIA: 414 MS
R AXIS: 21 DEGREES
RBC # BLD AUTO: 4.68 X10*6/UL (ref 4–5.2)
SODIUM SERPL-SCNC: 138 MMOL/L (ref 136–145)
T AXIS: 40 DEGREES
T OFFSET: 417 MS
VENTRICULAR RATE: 78 BPM
VIT B12 SERPL-MCNC: 347 PG/ML (ref 211–911)
WBC # BLD AUTO: 8.8 X10*3/UL (ref 4.4–11.3)

## 2025-01-16 PROCEDURE — G0427 INPT/ED TELECONSULT70: HCPCS | Performed by: STUDENT IN AN ORGANIZED HEALTH CARE EDUCATION/TRAINING PROGRAM

## 2025-01-16 PROCEDURE — 36415 COLL VENOUS BLD VENIPUNCTURE: CPT | Performed by: INTERNAL MEDICINE

## 2025-01-16 PROCEDURE — 2500000004 HC RX 250 GENERAL PHARMACY W/ HCPCS (ALT 636 FOR OP/ED): Performed by: INTERNAL MEDICINE

## 2025-01-16 PROCEDURE — 99232 SBSQ HOSP IP/OBS MODERATE 35: CPT | Performed by: INTERNAL MEDICINE

## 2025-01-16 PROCEDURE — 80053 COMPREHEN METABOLIC PANEL: CPT | Performed by: INTERNAL MEDICINE

## 2025-01-16 PROCEDURE — 85025 COMPLETE CBC W/AUTO DIFF WBC: CPT | Performed by: INTERNAL MEDICINE

## 2025-01-16 PROCEDURE — 92610 EVALUATE SWALLOWING FUNCTION: CPT | Mod: GN | Performed by: SPEECH-LANGUAGE PATHOLOGIST

## 2025-01-16 PROCEDURE — 82550 ASSAY OF CK (CPK): CPT | Performed by: INTERNAL MEDICINE

## 2025-01-16 PROCEDURE — G0378 HOSPITAL OBSERVATION PER HR: HCPCS

## 2025-01-16 PROCEDURE — 93005 ELECTROCARDIOGRAM TRACING: CPT

## 2025-01-16 PROCEDURE — 97161 PT EVAL LOW COMPLEX 20 MIN: CPT | Mod: GP

## 2025-01-16 PROCEDURE — 96375 TX/PRO/DX INJ NEW DRUG ADDON: CPT | Mod: 59

## 2025-01-16 PROCEDURE — 94640 AIRWAY INHALATION TREATMENT: CPT

## 2025-01-16 PROCEDURE — 96372 THER/PROPH/DIAG INJ SC/IM: CPT | Performed by: INTERNAL MEDICINE

## 2025-01-16 PROCEDURE — 94760 N-INVAS EAR/PLS OXIMETRY 1: CPT

## 2025-01-16 PROCEDURE — 96365 THER/PROPH/DIAG IV INF INIT: CPT | Mod: 59

## 2025-01-16 PROCEDURE — 96361 HYDRATE IV INFUSION ADD-ON: CPT | Mod: 59

## 2025-01-16 PROCEDURE — 2500000004 HC RX 250 GENERAL PHARMACY W/ HCPCS (ALT 636 FOR OP/ED): Performed by: STUDENT IN AN ORGANIZED HEALTH CARE EDUCATION/TRAINING PROGRAM

## 2025-01-16 PROCEDURE — 2500000001 HC RX 250 WO HCPCS SELF ADMINISTERED DRUGS (ALT 637 FOR MEDICARE OP): Performed by: INTERNAL MEDICINE

## 2025-01-16 PROCEDURE — 96376 TX/PRO/DX INJ SAME DRUG ADON: CPT | Mod: 59

## 2025-01-16 PROCEDURE — 9420000001 HC RT PATIENT EDUCATION 5 MIN

## 2025-01-16 PROCEDURE — 97165 OT EVAL LOW COMPLEX 30 MIN: CPT | Mod: GO

## 2025-01-16 PROCEDURE — 2500000001 HC RX 250 WO HCPCS SELF ADMINISTERED DRUGS (ALT 637 FOR MEDICARE OP): Performed by: REGISTERED NURSE

## 2025-01-16 RX ORDER — KETOROLAC TROMETHAMINE 30 MG/ML
30 INJECTION, SOLUTION INTRAMUSCULAR; INTRAVENOUS ONCE
Status: COMPLETED | OUTPATIENT
Start: 2025-01-16 | End: 2025-01-16

## 2025-01-16 RX ORDER — MAGNESIUM SULFATE 1 G/100ML
1 INJECTION INTRAVENOUS ONCE
Status: COMPLETED | OUTPATIENT
Start: 2025-01-16 | End: 2025-01-16

## 2025-01-16 RX ORDER — METOCLOPRAMIDE HYDROCHLORIDE 5 MG/ML
10 INJECTION INTRAMUSCULAR; INTRAVENOUS ONCE
Status: COMPLETED | OUTPATIENT
Start: 2025-01-16 | End: 2025-01-16

## 2025-01-16 RX ORDER — ACETAMINOPHEN 500 MG
5 TABLET ORAL NIGHTLY PRN
Status: DISCONTINUED | OUTPATIENT
Start: 2025-01-16 | End: 2025-01-17 | Stop reason: HOSPADM

## 2025-01-16 RX ADMIN — DULOXETINE HYDROCHLORIDE 30 MG: 30 CAPSULE, DELAYED RELEASE ORAL at 09:17

## 2025-01-16 RX ADMIN — HYDROXYZINE HYDROCHLORIDE 50 MG: 25 TABLET ORAL at 20:01

## 2025-01-16 RX ADMIN — THIAMINE HYDROCHLORIDE 500 MG: 100 INJECTION, SOLUTION INTRAMUSCULAR; INTRAVENOUS at 09:29

## 2025-01-16 RX ADMIN — THIAMINE HYDROCHLORIDE 500 MG: 100 INJECTION, SOLUTION INTRAMUSCULAR; INTRAVENOUS at 20:01

## 2025-01-16 RX ADMIN — ENOXAPARIN SODIUM 40 MG: 40 INJECTION SUBCUTANEOUS at 19:02

## 2025-01-16 RX ADMIN — SODIUM CHLORIDE 1000 ML: 900 INJECTION, SOLUTION INTRAVENOUS at 12:42

## 2025-01-16 RX ADMIN — METOCLOPRAMIDE HYDROCHLORIDE 10 MG: 5 INJECTION INTRAMUSCULAR; INTRAVENOUS at 12:43

## 2025-01-16 RX ADMIN — DULOXETINE HYDROCHLORIDE 30 MG: 30 CAPSULE, DELAYED RELEASE ORAL at 20:01

## 2025-01-16 RX ADMIN — ASPIRIN 81 MG: 81 TABLET, COATED ORAL at 09:16

## 2025-01-16 RX ADMIN — PREGABALIN 200 MG: 100 CAPSULE ORAL at 09:16

## 2025-01-16 RX ADMIN — PANTOPRAZOLE SODIUM 40 MG: 40 TABLET, DELAYED RELEASE ORAL at 06:23

## 2025-01-16 RX ADMIN — FLUTICASONE FUROATE AND VILANTEROL TRIFENATATE 1 PUFF: 100; 25 POWDER RESPIRATORY (INHALATION) at 07:27

## 2025-01-16 RX ADMIN — PREGABALIN 200 MG: 100 CAPSULE ORAL at 20:01

## 2025-01-16 RX ADMIN — KETOROLAC TROMETHAMINE 30 MG: 30 INJECTION, SOLUTION INTRAMUSCULAR at 12:44

## 2025-01-16 RX ADMIN — ACETAMINOPHEN 650 MG: 325 TABLET ORAL at 10:31

## 2025-01-16 RX ADMIN — MAGNESIUM SULFATE IN DEXTROSE 1 G: 10 INJECTION, SOLUTION INTRAVENOUS at 15:39

## 2025-01-16 RX ADMIN — THIAMINE HYDROCHLORIDE 500 MG: 100 INJECTION, SOLUTION INTRAMUSCULAR; INTRAVENOUS at 15:24

## 2025-01-16 RX ADMIN — Medication 5 MG: at 21:50

## 2025-01-16 ASSESSMENT — PAIN SCALES - GENERAL
PAINLEVEL_OUTOF10: 0 - NO PAIN
PAINLEVEL_OUTOF10: 0 - NO PAIN
PAINLEVEL_OUTOF10: 7
PAINLEVEL_OUTOF10: 8
PAINLEVEL_OUTOF10: 3
PAINLEVEL_OUTOF10: 0 - NO PAIN
PAINLEVEL_OUTOF10: 7
PAINLEVEL_OUTOF10: 0 - NO PAIN
PAINLEVEL_OUTOF10: 8
PAINLEVEL_OUTOF10: 0 - NO PAIN

## 2025-01-16 ASSESSMENT — COGNITIVE AND FUNCTIONAL STATUS - GENERAL
WALKING IN HOSPITAL ROOM: A LITTLE
MOVING FROM LYING ON BACK TO SITTING ON SIDE OF FLAT BED WITH BEDRAILS: A LITTLE
MOVING TO AND FROM BED TO CHAIR: A LITTLE
WALKING IN HOSPITAL ROOM: A LITTLE
DAILY ACTIVITIY SCORE: 24
PERSONAL GROOMING: A LITTLE
CLIMB 3 TO 5 STEPS WITH RAILING: A LITTLE
TOILETING: A LITTLE
CLIMB 3 TO 5 STEPS WITH RAILING: A LITTLE
MOBILITY SCORE: 22
HELP NEEDED FOR BATHING: A LITTLE
DAILY ACTIVITIY SCORE: 24
MOBILITY SCORE: 17
TURNING FROM BACK TO SIDE WHILE IN FLAT BAD: A LITTLE
DAILY ACTIVITIY SCORE: 19
WALKING IN HOSPITAL ROOM: A LITTLE
MOBILITY SCORE: 22
STANDING UP FROM CHAIR USING ARMS: A LITTLE
DRESSING REGULAR UPPER BODY CLOTHING: A LITTLE
DRESSING REGULAR LOWER BODY CLOTHING: A LITTLE
CLIMB 3 TO 5 STEPS WITH RAILING: A LOT

## 2025-01-16 ASSESSMENT — ACTIVITIES OF DAILY LIVING (ADL)
BATHING_ASSISTANCE: MINIMAL
ADL_ASSISTANCE: INDEPENDENT
ADL_ASSISTANCE: INDEPENDENT

## 2025-01-16 ASSESSMENT — PAIN - FUNCTIONAL ASSESSMENT
PAIN_FUNCTIONAL_ASSESSMENT: 0-10

## 2025-01-16 ASSESSMENT — PAIN SCALES - WONG BAKER
WONGBAKER_NUMERICALRESPONSE: NO HURT
WONGBAKER_NUMERICALRESPONSE: NO HURT
WONGBAKER_NUMERICALRESPONSE: HURTS WHOLE LOT

## 2025-01-16 ASSESSMENT — PAIN DESCRIPTION - LOCATION: LOCATION: HEAD

## 2025-01-16 ASSESSMENT — PAIN DESCRIPTION - ORIENTATION: ORIENTATION: OTHER (COMMENT)

## 2025-01-16 NOTE — PROGRESS NOTES
Mona Sanders is a 59 y.o. female on day 0 of admission presenting with Dizziness.      Subjective   Reviewed PT notes she ambulated quite well although unsteady  Now patient explains that she gets dizzy upon standing but then gets better  MRI results reviewed negative for new stroke, neurology notes still pending    Objective     Last Recorded Vitals  /69 (BP Location: Left arm, Patient Position: Lying)   Pulse 89   Temp 37 °C (98.6 °F) (Temporal)   Resp 17   Wt 82.9 kg (182 lb 12.2 oz)   SpO2 95%   Intake/Output last 3 Shifts:    Intake/Output Summary (Last 24 hours) at 1/16/2025 1216  Last data filed at 1/16/2025 0439  Gross per 24 hour   Intake 1000 ml   Output 800 ml   Net 200 ml       Physical Exam  No distress alert oriented x 3 cooperative  Chest clear  Heart regular  Abdomen soft nontender  Extremities no edema  Neurologic exam grossly in the bed nonfocal  Relevant Results  Reviewed  Assessment/Plan     Assessment & Plan  Dizziness    COPD (chronic obstructive pulmonary disease) (Multi)    Weakness    Ambulatory dysfunction    Nystagmus    History of stroke    Chronic pain syndrome      January 16,    Await neurology input.  Check orthostatics.  Continue PT/OT eval while in the hospital             Yo Enamorado MD

## 2025-01-16 NOTE — PROGRESS NOTES
Speech-Language Pathology    Inpatient Speech-Language Pathology Clinical Swallow Evaluation       Patient Name: Mona Sanders  MRN: [id number]  : 1965  Today's Date: 25  Time Calculation  Start Time: 810  Stop Time: 830  Time Calculation (min): 20 min       Reason for Consultation:  Assess oropharyngeal swallow function to rule out aspiration in the setting of acute stroke like sx per stroke protocol     Recommendations:  Regular solids  Thin liquids  No further skilled dysphagia treatment is warranted as pt is at functional baseline.     Skilled dysphagia treatment is not warranted at next level of care.     Assessment:  Consistencies Trialed:   Pt participated in diagnostic meal assessment with fresh fruit, pancakes, coffee, and water.     OME and CNE are grossly WFL. Vocal quality and cough are perceptually WFL. Pt denies any prior dysphagia, any recent rapid weight loss, and any recent pneumonias.     Oral phase - Oral mucosa moist and normal in coloration. Mastication, bolus manipulation, and oral clearance timely and functional. Pt noted with only upper dentures in place (lowers in room but not placed per pt preference); dentition status does not impact function of mastication.     Pharyngeal phase - Although it is a limited assessment technique; Hyolaryngeal elevation/excursion assessed via digital palpation and appeared consistent across all trials. No coughing, choking, nor change in vocal quality present throughout trials. No overt s/s of aspiration present at the bedside.      Relevant Imaging Results:  Chest imaging reviewed and is unremarkable      Plan:  SLP Plan: No skilled SLP               Discussed POC: Patient   Discussed Risks/Benefits: Yes   Patient/Caregiver Agreeable: Yes     Skilled dysphagia treatment is not warranted due to patient is at functional baseline and there are no overt s/s of aspiration nor dysphagia at bedside    Goals:  None. No further treatment is  warranted.    Subjective   Patient is pleasant and participative. Gives informed consent for participation in swallow evaluation.     General Visit Information:  Past medical history:   Per H&P:   Mona Sanders is a 59 y.o. female presenting with weakness inability to walk.  This patient presents to the emergency room with difficulty ambulation.  She has had longstanding issues with some double vision and coordination after she had a stroke 5 years ago.  Ever since [place] time however she started to experience significant weakness discoordination.  She lives with her father and basically says that for the last week she has been unable to walk as she hits things when she walks due to poor vision and coordination and overall weakness.  She has had issues with her gaze and eyes for a long time and this has been treated by pain management physician with Lyrica which has been increased to 300 mg twice a day fairly recently.  Emergency room CT of the brain was negative initial neuroexam by the ER physician was negative Case was discussed with neurology who recommended that she was discharged or if she stays to have an MRI.  The patient is a very good historian but basically she denies any recent viral illness denies any fever denies any headache denies any particular neck pain or back pain denies any urinary or bladder incontinence or rectal incontinence denies any skin rashes or symptoms     Pain:  Pain Assessment  Pain Assessment: 0-10  0-10 (Numeric) Pain Score: 0 - No pain     Education:   Patient education completed regarding results and recommendations of SLP evaluation. Pt verbalizes understanding with all patient questions answered to satisfaction.     Care Team involvement:   Communicated with bedside nurse prior to evaluation with clearance for patient participation obtained and Results of the bedside swallowing evaluation were discussed with nurse and verbalized understanding was noted.

## 2025-01-16 NOTE — PROGRESS NOTES
"   01/16/25 2580   Discharge Planning   Living Arrangements Parent;Children  (Lives with father and her daughter)   Support Systems Parent;Children   Assistance Needed YES - Supervision for safety, not currently driving, most IADLs at this time and ADL assistance.   Type of Residence Private residence   Number of Stairs to Enter Residence   (\"a few\" entry steps)   Number of Stairs Within Residence   (Pateint has main level set up, daughter goes to basement)   Home or Post Acute Services In home services  (Patient agreeable to [facility].  INTERNAL referral needed for home care at d/c.  Attending and bedside nurse advised of same.  Care Transitions following for updates/changes to discharge needs/plan.)   Type of Home Care Services Home OT;Home PT   Expected Discharge Disposition Home H   Does the patient need discharge transport arranged? No   Patient Choice   Provider Choice list and CMS website (https://medicare.gov/care-compare#search) for post-acute Quality and Resource Measure Data were provided and reviewed with: Patient   Patient / Family choosing to utilize agency / facility established prior to hospitalization No       "

## 2025-01-16 NOTE — PROGRESS NOTES
Evaluation    Patient Name: Mona Sanders  MRN: [id number]  Department: [address]  Room: [address] [place] [address]  Today's Date: 1/16/2025  Time Calculation  Start Time: 0913  Stop Time: 0923  Time Calculation (min): 10 min    Assessment  IP OT Assessment  OT Assessment: 60 yo female admitted with dizziness presents below baseline functional status d/t impaired balance, decreased activity tolerance, and generalized weakness.  Pt would benefit from acute OT services to provide ADL retraining utilizing compensatory techniques and progressive strengthening in order to increase functional capacity and safety with mobility.  Prognosis: Good  Barriers to Discharge Home: Physical needs  Physical Needs: Intermittent mobility assistance needed, Intermittent ADL assistance needed  Evaluation/Treatment Tolerance: Patient limited by fatigue  Medical Staff Made Aware: Yes  End of Session Communication: Bedside nurse  End of Session Patient Position: Bed, 3 rail up, Alarm on    Plan:  Treatment Interventions: ADL retraining, Functional transfer training, UE strengthening/ROM, Endurance training, Patient/family training, Equipment evaluation/education, Compensatory technique education  OT Frequency: 3 times per week  OT Discharge Recommendations: Low intensity level of continued care  Equipment Recommended upon Discharge: Wheeled walker  OT Recommended Transfer Status: Assist of 1  OT - OK to Discharge: Yes      Subjective   Current Problem:  1. Dizziness  Referral to Neurology        General:  General  Reason for Referral: Impaired ADLs, dizziness  Referred By: Dr Shultz  Past Medical History Relevant to Rehab: poly-substance abuse, a-fib, CVA with residual visual deficits, HTN, HLD, COPD, anxiety, depression  Family/Caregiver Present: No  Prior to Session Communication: Bedside nurse  Patient Position Received: Bed, 3 rail up, Alarm off, caregiver present  Preferred Learning Style: verbal, visual  General Comment: Cleared by nursing and  "agreeable for therapy    Precautions:  Hearing/Visual Limitations: reports baseline visual impairments/diploplia, requiring \"new glasses.  Medical Precautions: Fall precautions    Pain:  Pain Assessment  0-10 (Numeric) Pain Score: 7 (= left hip (nursing aware))      Objective   Cognition:  Orientation Level: Oriented X4     Home Living:  Type of Home: House  Lives With:  (Father and daughter)  Home Adaptive Equipment: Walker rolling or standard, Cane, Wheelchair-manual, Reacher  Home Layout: Multi-level, Able to live on main level with bedroom/bathroom, Laundry in basement  Home Access: Stairs to enter with rails  Entrance Stairs-Rails: Both  Entrance Stairs-Number of Steps: 3  Bathroom Shower/Tub: Tub/shower unit  Bathroom Toilet: Standard  Bathroom Equipment: Grab bars in shower  Home Living Comments: Lives in a ranch house     Prior Function:  Level of [place]: Independent with ADLs and functional transfers, Needs assistance with homemaking  Receives Help From: Family (Shares IADLs with family)  ADL Assistance: Independent  Ambulatory Assistance: Independent (furniture walks)  Vocational:  (Recently quit part-time job at [employer])  Hand Dominance: Right  Prior Function Comments: Pt reports decline since [place] d/t decline in vision and balance:  \"I keep falling backwards.\"  Pt reports spending most of the time in bed recently d/t fear of falling.    ADL:  Eating Assistance: Independent  Grooming Assistance: Stand by  Grooming Deficit: Setup  Bathing Assistance: Minimal  Bathing Deficit: Steadying  UE Dressing Assistance: Stand by  UE Dressing Deficit: Setup  LE Dressing Assistance: Minimal  LE Dressing Deficit: Steadying  Toileting Assistance with Device: Minimal  Toileting Deficit: Steadying  ADL Comments: patient able to thi socks seated in chair with setup assist - remainder of ADLs = per clinical judgement this date    Activity Tolerance:  Endurance: Decreased tolerance for upright activites    Bed " Mobility/Transfers: Bed Mobility 1  Bed Mobility 1: Supine to sitting  Level of Assistance 1: Close supervision  Bed Mobility Comments 1: toward the right side of bed with head elevated ~40 degrees  Bed Mobility 2  Bed Mobility  2: Sitting to supine  Level of Assistance 2: Close supervision    Transfer 1  Transfer From 1: Bed to (and from)  Transfer to 1: Chair with arms  Technique 1: Stand pivot, To right, To left  Transfer Level of Assistance 1: Contact guard    Sensation:  Sensation Comment: reports tingling/numbness in fingertips at baseline    Strength:  Strength Comments: BUEs=~3+/5    Hand Function:  Hand Function  Gross Grasp: Functional  Coordination: Functional    Extremities: RUE   RUE : Within Functional Limits and LUE   LUE: Within Functional Limits    Outcome Measures: [facility] Daily Activity  Putting on and taking off regular lower body clothing: A little  Bathing (including washing, rinsing, drying): A little  Putting on and taking off regular upper body clothing: A little  Toileting, which includes using toilet, bedpan or urinal: A little  Taking care of personal grooming such as brushing teeth: A little  Eating Meals: None  Daily Activity - Total Score: 19      Education Documentation  ADL Training, taught by Swathi Carrera, OT at 1/16/2025 11:00 AM.  Learner: Patient  Readiness: Acceptance  Method: Explanation  Response: Needs Reinforcement, Demonstrated Understanding  Comment: Functional transfer/mobility retraining initiated    Goals:   Encounter Problems       Encounter Problems (Active)       OT Goals       ADLs (Progressing)       Start:  01/16/25    Expected End:  01/30/25       Patient will complete ADL tasks, with modified independent using AE need in order to increase patient's safety and independence with self-care tasks.           Functional transfers (Progressing)       Start:  01/16/25    Expected End:  01/30/25       Patient will complete functional transfers with modified  independent using least restrictive device, in order to increase patient's safety and independence with daily tasks.           Activity tolerance (Progressing)       Start:  01/16/25    Expected End:  01/30/25       Patient will demonstrate the ability to participate in functional activity at least >/= 25 minutes in order to increase patient's safety and independence with daily tasks.

## 2025-01-16 NOTE — CONSULTS
HPI: Teleneuro consult at [facility]   59 y.o. female with hx of migraine, diplopia, prior stroke presenting with worsening of her diplopia, imbalance.    Pt states she has longstanding hx of diplopia 'from her old stroke'. As far as she knows she has only had one stroke. She was given Lyrica for the diplopia post stroke, which seemed to cure it, though she is aware that is quite unusual.     Her hx had to be clarified multiple times due to minor inconsistencies in her hx -- she states she has been having worsening neck pain that her lyrica was increased to 300mg TID on October. She also had cervical injection for a radiculopathy in November which caused numbness around her neck, then had stomach flu around Vivi time. It is around vivi time that her diplopia worsened and her balance worsened. She came for evaluation due to worsening balance. She feels her sx is similar to her old 'stroke' sx, very similar in nature, and not any worse than it.     Per chart review pt presented with similar dizziness, diplopia and imbalance 10/15/2021 -- at that time MRI negative for new stroke, only R frontal encephalomalacia, not explaining her sx. Eval by Dr. Rodriguez. Suspected migraine variant. Discharged to see neuro ophthalmologist. At that time pt was on lyrica for migraine preventative.    Denies new vision changes other than gradual worsening of diplopia above, weakness, numbness, speech change. No falls.     MRI was done and was negative for stroke or any acute findings. There is R frontal encephalomalacia, medial thalamus / midbrain encephalomalacia, few minor cerebellar lesions.        Patient Active Problem List    Diagnosis Date Noted    Weakness 01/15/2025    Ambulatory dysfunction 01/15/2025    Nystagmus 01/15/2025    History of stroke 01/15/2025    Chronic pain syndrome 01/15/2025    Acute cystitis without hematuria 10/18/2024    Hypoxia 10/18/2024    Migraine without status migrainosus, not intractable,  unspecified migraine type 10/17/2024    Routine general medical examination at a health care facility 07/16/2024    Colon cancer screening 07/16/2024    Oral lesion 04/18/2024    Cervical radiculitis 04/18/2024    COPD (chronic obstructive pulmonary disease) (Multi) 03/21/2024    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    Cough 03/21/2024    Disease due to severe acute respiratory syndrome coronavirus 2 (SARS-CoV-2) 03/21/2024    Myofascial pain dysfunction syndrome 03/21/2024    Pain in throat 03/21/2024    Sore throat 03/21/2024    Vascular headache 03/21/2024    Overweight with body mass index (BMI) 25.0-29.9 03/21/2024    Chronic pain of left upper extremity 03/21/2024    Cervicalgia 02/20/2024    Diplopia 01/22/2024    Dizziness 01/22/2024    Post concussion syndrome 01/22/2024    Bilateral occipital neuralgia 01/22/2024    Body mass index (BMI) of 25.0 to 25.9 in adult 08/31/2023    Cerebral infarction due to occlusion of small artery (Multi) 08/31/2023    Colon polyposis 08/31/2023    Sciatic pain 08/31/2023    Tobacco abuse 08/31/2023    Vitamin D deficiency 08/31/2023    Atrial fibrillation (Multi) 08/31/2023    Cerebrovascular accident (CVA) (Multi) 08/31/2023    Brain concussion 08/31/2023    Palpitations 08/31/2023    Syncope and collapse 08/31/2023    Sensation of chest tightness 08/31/2023    Venous thrombosis 08/31/2023    Otalgia 08/31/2023    At risk for bleeding 08/31/2023    Pain of ear structure 08/31/2023    Polyp of colon 08/31/2023    Encounter for routine child health examination w/o abnormal findings 06/05/2023    Anemia 06/02/2023    Cervical disc disease 06/02/2023    Chronic neck pain 06/02/2023    Polyp of ascending colon 06/02/2023    Depression with anxiety 06/02/2023    Headache 06/02/2023    Hyperlipidemia 06/02/2023    Incontinence 06/02/2023    Insomnia 06/02/2023    Iron deficiency anemia 06/02/2023    Greater trochanteric bursitis of left hip 06/02/2023    Leg edema 06/02/2023     Disorder of joint of pelvic region and thigh 06/02/2023    Calculus of kidney 06/02/2023    Neurogenic bladder 06/02/2023    Nicotine dependence 06/02/2023    Osteoarthritis of hip 06/02/2023    Right thalamic infarction (Multi) 06/02/2023    Shortness of breath 06/02/2023    Synovial cyst of right knee 06/02/2023    Tachycardia 06/02/2023    Vaginal atrophy 06/02/2023    Mixed anxiety depressive disorder 06/02/2023    Greater trochanteric bursitis 06/02/2023    Edema of lower extremity 06/02/2023    Infarction of thalamus (Multi) 06/02/2023    Contact with and (suspected) exposure to covid-19 05/12/2022    Essential (primary) hypertension 05/12/2022    Poisoning by fentanyl or fentanyl analogs, accidental (unintentional), initial encounter (Multi) 05/12/2022    Right lower quadrant abdominal pain 05/12/2022    Dizziness and giddiness 10/15/2021    Allergic rhinitis 07/11/2013    Opiate dependence, continuous (Multi) 05/03/2013    Lumbosacral spondylosis without myelopathy 03/11/2009    Cervical spondylosis without myelopathy 03/11/2009     Current Facility-Administered Medications   Medication Dose Route Frequency Provider Last Rate Last Admin    acetaminophen (Tylenol) tablet 650 mg  650 mg oral q4h PRN Yo Enamorado MD   650 mg at 01/16/25 1031    Or    acetaminophen (Tylenol) oral liquid 650 mg  650 mg oral q4h PRN Yo Enamorado MD        Or    acetaminophen (Tylenol) suppository 650 mg  650 mg rectal q4h PRN Yo Enamorado MD        albuterol 90 mcg/actuation inhaler 2 puff  2 puff inhalation q4h PRN Yo Enamorado MD        aspirin EC tablet 81 mg  81 mg oral Daily Yo Enamorado MD   81 mg at 01/16/25 0916    DULoxetine (Cymbalta) DR capsule 30 mg  30 mg oral BID Yo Enamorado MD   30 mg at 01/16/25 0917    enoxaparin (Lovenox) syringe 40 mg  40 mg subcutaneous q24h Yo Enamorado MD   40 mg at 01/15/25 2126    fluticasone furoate-vilanteroL (Breo  Ellipta) 100-25 mcg/dose inhaler 1 puff  1 puff inhalation Daily Yo Enamorado MD   1 puff at 01/16/25 0727    hydrOXYzine HCL (Atarax) tablet 50 mg  50 mg oral BID PRN Yo Enamorado MD   50 mg at 01/15/25 2154    ipratropium-albuteroL (Duo-Neb) 0.5-2.5 mg/3 mL nebulizer solution 3 mL  3 mL nebulization 4x daily PRN Yo Enamorado MD        magnesium sulfate 1 g in dextrose 5%  mL  1 g intravenous Once Esperanza Llamas MD        pantoprazole (ProtoNix) EC tablet 40 mg  40 mg oral Daily before breakfast Yo Enamorado MD   40 mg at 01/16/25 0623    Or    pantoprazole (ProtoNix) injection 40 mg  40 mg intravenous Daily before breakfast Yo Enamorado MD        polyethylene glycol (Glycolax, Miralax) packet 17 g  17 g oral Daily Yo Enamorado MD        pregabalin (Lyrica) capsule 200 mg  200 mg oral BID Yo Enamorado MD   200 mg at 01/16/25 0916    sodium chloride 0.9 % bolus 1,000 mL  1,000 mL intravenous Once Esperanza Llamas  mL/hr at 01/16/25 1242 1,000 mL at 01/16/25 1242    thiamine (Vitamin B1) injection 500 mg  500 mg intravenous TID Yo Enamorado MD   500 mg at 01/16/25 0929     Allergies: Tramadol and Wellbutrin [bupropion hcl]  Past Medical History:   Diagnosis Date    Acute exacerbation of chronic obstructive pulmonary disease (Multi) 03/21/2024    Anxiety     Colon cancer screening 07/30/2024    Depression     FHx: atrial fibrillation     Mother/Patient denies any personal history.    History of anemia     HLD (hyperlipidemia)     Hypertension     Incontinence     Ingrown toenail of left foot     Chronic pain    Iron deficiency anemia     Nephrolithiasis     PSH Stone extraction    Nicotine dependence     OAB (overactive bladder)     Neuromuscular dysfunction of bladder    Opiate dependence     Pain in left hip 04/13/2022    Left hip pain    Right knee pain 06/02/2023    Rotator cuff injury, left, sequela     SOB (shortness of breath)      Spondylosis of cervical spine     Spondylosis of lumbar spine     Stroke (cerebrum)     Right thalmic/Residual vision issues.    Transient cerebral ischemic attack 10/14/2021    Tubal pregnancy (HHS-HCC)     PSH     Past Surgical History:   Procedure Laterality Date    CT ANGIO NECK  10/14/2021    CT NECK ANGIO W AND WO IV CONTRAST 10/14/2021 [facility] [place] CLINICAL LEGACY    CT HEAD ANGIO W AND WO IV CONTRAST  10/14/2021    CT HEAD ANGIO W AND WO IV CONTRAST 10/14/2021 [facility] [place] CLINICAL LEGACY    ECTOPIC PREGNANCY SURGERY      HIP ARTHROPLASTY  08/31/2020    MR HEAD ANGIO WO IV CONTRAST  06/02/2020    MR HEAD ANGIO WO IV CONTRAST 6/2/2020 [facility] [place] CLINICAL LEGACY    MR HEAD ANGIO WO IV CONTRAST  08/10/2022    MR HEAD ANGIO WO IV CONTRAST 8/10/2022 [facility] [place] CLINICAL LEGACY    MR HEAD ANGIO WO IV CONTRAST  06/24/2019    MR HEAD ANGIO WO IV CONTRAST [facility] INPATIENT LEGACY    MR NECK ANGIO WO IV CONTRAST  06/02/2020    MR NECK ANGIO WO IV CONTRAST 6/2/2020 [facility] [place] CLINICAL LEGACY    MR NECK ANGIO WO IV CONTRAST  08/10/2022    MR NECK ANGIO WO IV CONTRAST 8/10/2022 [facility] [place] CLINICAL LEGACY    URETEROLITHOTOMY       Family History   Problem Relation Name Age of Onset    Atrial fibrillation Mother      Diabetes Father      Hypertension Father       Social History     Tobacco Use    Smoking status: Every Day     Types: Cigarettes    Smokeless tobacco: Never   Substance Use Topics    Alcohol use: Never       Exam:  Vitals:    01/16/25 1231   BP: 104/69   Pulse: 101   Resp:    Temp:    SpO2:      Virtual, limited exam performed  Patient is laying in bed, in NAD  Language without dysarthria or aphasia  Gaze midline, EOM appears full range horizontally and vertically, pt reports diplopia at primary position, worsened with looking up and to the left, no associated nystagmus appreciated   Face is symmetric on eyeclosure, eyebrow raise and smile  Tongue midline   No drift in both arms and no drift in both legs  Sensation intact to light touch   Gait unsteady  -- stabilizes with a walker     NIHSS 0     Imaging Results:  MRI: MR brain wo IV contrast    Result Date: 1/16/2025  No evidence of acute infarct, intracranial mass effect or midline shift.   Old right frontal lobe infarct and old lacunar infarcts within the right thalamus and bilateral cerebellum, similar to the prior exam.   MACRO: None   Signed by: Shirley Nash 1/16/2025 8:26 AM Dictation workstation:   [id number]   CT Head: CT head wo IV contrast    Result Date: 1/15/2025  Minimal chronic ischemic changes and encephalomalacia in the right frontal lobe related to remote infarct or remote injury again noted, without acute intracranial process.   Signed by: Oneil Rosado 1/15/2025 4:04 PM Dictation workstation:   [id number]   Echo: No echocardiogram results found for the past 14 days    ASSESSMENT:  59 y.o. female with hx of migraine, diplopia, prior stroke presenting with worsening of her diplopia, imbalance. Pt reports these sx are similar to her 'old stroke sx' -- which may be her R thalamic/dorsal midbrain lesion sx or cerebellar lesion sx rather than R frontal encephalomalacia stroke sx. Limited virtual neuro exam is rather unremarkable other than diplopia triggered by upgaze / L gaze and gait imbalance.     Her sx is most consistent with recrudescence of stroke in the setting of high lyrica dose, significant mental stressors. However also cannot rule out migraine contributing to it. Pt is already on reduced dose of lyrica this admission. Would do trial of migraine cocktail.       Diagnosis:  Old cerebellar lacunar infarct  Old R frontal infarct  Old R thalamic / midbrain infarct    Migraine     RECOMMENDATIONS:   - no further acute neurological evaluation necessary   - agree with decreasing lyrica dose  - migraine cocktail trial  - PT/OT   - follow up with her pain management, psychiatry providers     Consult completed by: TELEPHONE and VIDEO interactive communication was used to provide this telehealth  service. Time includes consultation with ED provider and extensive review of data- history, medical records, lab/radiology/medical test results, neuroimaging studies:  70 minutes was spent in INITIAL telehealth consultation

## 2025-01-16 NOTE — CARE PLAN
The patient's goals for the shift include relief from dizziness.    The clinical goals for the shift include maintaining hemodynamic stability.

## 2025-01-16 NOTE — CARE PLAN
The patient's goals for the shift include  Safety    The clinical goals for the shift include Safety    Over the shift, the patient did not make progress toward the following goals. Barriers to progression include none. Recommendations to address these barriers include none.

## 2025-01-16 NOTE — PROGRESS NOTES
"Physical Therapy    Physical Therapy Evaluation    Patient Name: Mona Sanders  MRN: [id number]  Department: [address]  Room: [address] [place], [place] [address]  Today's Date: 1/16/2025   Time Calculation  Start Time: 0758  Stop Time: 0818  Time Calculation (min): 20 min    Assessment/Plan   PT Assessment  PT Assessment Results: Decreased strength, Decreased endurance, Impaired balance, Decreased mobility, Decreased coordination, Impaired judgement, Decreased safety awareness, Impaired vision  Rehab Prognosis: Good  Barriers to Discharge Home: Physical needs  Physical Needs: Intermittent mobility assistance needed  Evaluation/Treatment Tolerance: Patient limited by fatigue  Medical Staff Made Aware: Yes  End of Session Communication: Bedside nurse  End of Session Patient Position: Up in chair, Alarm on (SLP at bedside)          Assessment Comment: 60 y/o female who presented to ED with inability to ambulate. Pt reports baseline visual disturbances \"since my stroke,\" although verbalizing worsening of visual symptoms, thus resulting in decreased ambulation. Pt is currently an increased falls risk due to impaired coordination/balance, decreased strength, baseline visual impairments, and decreased safety awareness. Pt pending MRI. Would benefit from cont PT services to maximize safe mobility. Would benefit from 24/7 assist/supervision upon dc, at this time, for safety.        IP OR SWING BED PT PLAN  Inpatient or Swing Bed: Inpatient  PT Plan  Treatment/Interventions: Bed mobility, Transfer training, Gait training, Stair training, Balance training, Neuromuscular re-education, Endurance training, Strengthening, Therapeutic exercise, Therapeutic activity  PT Plan: Ongoing PT  PT Frequency: 4 times per week  PT Discharge Recommendations: Low intensity level of continued care (24/7 supervision for safety)  Equipment Recommended upon Discharge:  (TBD)  PT Recommended Transfer Status: Assist x1, Assistive device  PT - OK to Discharge: " "Yes    Subjective   General Visit Information:  General  Reason for Referral: impaired mobility; inability to ambulate  Past Medical History Relevant to Rehab: PMH including but not limited to COPD, Anxiety, Depression, CVA with residual visual impairments, HTN< HLD  Family/Caregiver Present: No  Prior to Session Communication: Bedside nurse  Patient Position Received: Bed, 3 rail up, Alarm on  Preferred Learning Style: verbal, visual  General Comment: 58 y/o female who presented to ED with intability to ambulate. CT - negative for acute abnormalities. Pt cleared for mobility per nursing. Pt supine in bed upon arrival. Agreeable to participate.  Home Living:  Home Living  Type of Home: House  Lives With:  (Father and daughter)  Home Adaptive Equipment: Walker rolling or standard, Cane, Wheelchair-manual  Home Layout: One level, Laundry in basement  Home Access: Stairs to enter with rails  Entrance Stairs-Number of Steps: \"a few\" ELIZABETH  Home Living Comments: Reportedly has daughter go to the basement. Otherwise first floor set-up.  Prior Level of Function:  Prior Function Per Pt/Caregiver Report  Level of [place]: Independent with ADLs and functional transfers, Independent with homemaking with ambulation  Receives Help From: Family  ADL Assistance: Independent  Homemaking Assistance: Independent  Ambulatory Assistance: Independent  Prior Function Comments: Pt reported recent ambulation decline \"since Christmas,\" with \"falling backwards\" and poor coordination. Not using AD.  Prior to Christmas, ambulatory without AD and driving. Independent with ADLs.  Precautions:  Precautions  Hearing/Visual Limitations: reports baseline visual impairments/diploplia, requiring \"new glasses.\" Glasses not currently present.  Medical Precautions: Fall precautions      Vital Signs Comment: VSS     Objective   Pain:  Pain Assessment  Pain Assessment: 0-10  0-10 (Numeric) Pain Score: 0 - No pain  Cognition:  Cognition  Orientation " "Level: Oriented X4  Cognition Comments: able to follow commands. slightly impulsive with decreased insight.  Insight: Mild    General Assessments:       Activity Tolerance  Endurance: Decreased tolerance for upright activites  Activity Tolerance Comments: c/o intermittent dizziness    Sensation  Light Touch: No apparent deficits    Strength  Strength Comments: B LEs > 3/5 observed  Coordination  Coordination Comment: ataxic during ambulation     Static Sitting Balance  Static Sitting-Level of Assistance: Close supervision  Static Sitting-Comment/Number of Minutes: sitting on EOB  Dynamic Sitting Balance  Dynamic Sitting-Comments: Donned socks, sitting on EOB, without LOB    Static Standing Balance  Static Standing-Level of Assistance: Contact guard  Static Standing-Comment/Number of Minutes: UE support of walker. Pt with LOB posteriorly with initialy sit to stand transfer. Able to self correct. \"This is what has been happening.\"  Dynamic Standing Balance  Dynamic Standing-Comments: Requiring UE support of walker at this time. Unsteady without walker; reaching for external support.  Functional Assessments:  Bed Mobility  Bed Mobility: Yes  Bed Mobility 1  Bed Mobility 1: Supine to sitting  Level of Assistance 1: Close supervision  Bed Mobility Comments 1: HOB elevated    Transfers  Transfer: Yes  Transfer 1  Technique 1: Sit to stand, Stand to sit  Transfer Level of Assistance 1: Contact guard  Trials/Comments 1: pt with posterior LOB with initial sit to stand transfer. From elevated EOB. onto chair with arms    Ambulation/Gait Training  Ambulation/Gait Training Performed: Yes  Ambulation/Gait Training 1  Surface 1: Level tile  Device 1: Rolling walker  Assistance 1: Contact guard (Min assist with walker management)  Quality of Gait 1:  (decreased tu, ataxic like gait, minimal foot clearance. would occasionally list and push walker laterally)  Comments/Distance (ft) 1: about 30 ft x 1, 100 ft x 1 (Initially amb " about 5 ft without AD; reaching for external support)  Extremity/Trunk Assessments:  RLE   RLE : Within Functional Limits  LLE   LLE : Within Functional Limits  Outcome Measures:  [facility] Basic Mobility  Turning from your back to your side while in a flat bed without using bedrails: A little  Moving from lying on your back to sitting on the side of a flat bed without using bedrails: A little  Moving to and from bed to chair (including a wheelchair): A little  Standing up from a chair using your arms (e.g. wheelchair or bedside chair): A little  To walk in hospital room: A little  Climbing 3-5 steps with railing: A lot  Basic Mobility - Total Score: 17    Encounter Problems       Encounter Problems (Active)       Balance       dynamic (Progressing)       Start:  01/16/25    Expected End:  01/30/25       Pt will perform Tinetti assessment and score >/= 24/28, resulting in a low falls risk             Mobility       LTG - Patient will navigate 4-6 steps with rails/device (Progressing)       Start:  01/16/25    Expected End:  01/30/25            ambulation (Progressing)       Start:  01/16/25    Expected End:  01/30/25       Pt will amb > 250  ft with wheeled walker and supervision            PT Transfers       sit to stand (Progressing)       Start:  01/16/25    Expected End:  01/30/25       Pt will perform sit to stand transfer with LRAD and mod independence.             Pain - Adult          Safety       LTG - Patient will utilize safety techniques (Progressing)       Start:  01/16/25    Expected End:  01/30/25                   Education Documentation  Precautions, taught by Venecia Nolen PT at 1/16/2025  9:50 AM.  Learner: Patient  Readiness: Acceptance  Method: Explanation  Response: Needs Reinforcement  Comment: PT POC, safety with mobility, use of walker    Body Mechanics, taught by Venecia Nolen PT at 1/16/2025  9:50 AM.  Learner: Patient  Readiness: Acceptance  Method: Explanation  Response: Needs  Reinforcement  Comment: PT POC, safety with mobility, use of walker    Mobility Training, taught by Venecia Nolen PT at 1/16/2025  9:50 AM.  Learner: Patient  Readiness: Acceptance  Method: Explanation  Response: Needs Reinforcement  Comment: PT POC, safety with mobility, use of walker    Education Comments  No comments found.

## 2025-01-16 NOTE — PROGRESS NOTES
Spiritual Care Visit  Spiritual Care Request    Reason for Visit:  Routine Visit: Introduction     Request Received From:       Focus of Care:  Visited With: Patient         Refer to :          Spiritual Care Assessment    Spiritual Assessment:                      Care Provided:  Intended Effects: Establish rapport and connectedness, Build relationship of care and support, Demonstrate caring and concern, Lessen someone's feelings of isolation  Methods: Offer emotional support  Interventions: Explain  role    Sense of Community and or Judaism Affiliation:  Restorationism         Addressed Needs/Concerns and/or Love Through:          Outcome:        Advance Directives:         Spiritual Care Annotation        Annotation: provided patient support while rounding the Unit.  introduced  services of [facility].   explained the role of the  in providing emotional and spiritual support for patient's and family while in admitted to the hospital. Patient shared life story and  offered support. Patient identified as Church and this  requested permission to change Sikh affiliation.  No spiritual or Sikh needs were expressed. Spiritual care will remain available for support as requested.

## 2025-01-17 ENCOUNTER — HOME HEALTH ADMISSION (OUTPATIENT)
Dept: HOME HEALTH SERVICES | Facility: HOME HEALTH | Age: 60
End: 2025-01-17
Payer: COMMERCIAL

## 2025-01-17 ENCOUNTER — DOCUMENTATION (OUTPATIENT)
Dept: HOME HEALTH SERVICES | Facility: HOME HEALTH | Age: 60
End: 2025-01-17
Payer: COMMERCIAL

## 2025-01-17 VITALS
HEART RATE: 67 BPM | HEIGHT: 66 IN | BODY MASS INDEX: 29.44 KG/M2 | RESPIRATION RATE: 17 BRPM | TEMPERATURE: 97.3 F | WEIGHT: 183.2 LBS | DIASTOLIC BLOOD PRESSURE: 75 MMHG | SYSTOLIC BLOOD PRESSURE: 113 MMHG | OXYGEN SATURATION: 95 %

## 2025-01-17 LAB
ALBUMIN SERPL BCP-MCNC: 3.4 G/DL (ref 3.4–5)
ALP SERPL-CCNC: 85 U/L (ref 33–136)
ALT SERPL W P-5'-P-CCNC: 16 U/L (ref 7–45)
ANION GAP SERPL CALCULATED.3IONS-SCNC: 8 MMOL/L (ref 10–20)
AST SERPL W P-5'-P-CCNC: 12 U/L (ref 9–39)
BASOPHILS # BLD AUTO: 0.1 X10*3/UL (ref 0–0.1)
BASOPHILS NFR BLD AUTO: 1.3 %
BILIRUB SERPL-MCNC: 0.3 MG/DL (ref 0–1.2)
BUN SERPL-MCNC: 13 MG/DL (ref 6–23)
CALCIUM SERPL-MCNC: 8.4 MG/DL (ref 8.6–10.3)
CHLORIDE SERPL-SCNC: 112 MMOL/L (ref 98–107)
CK SERPL-CCNC: 24 U/L (ref 0–215)
CO2 SERPL-SCNC: 25 MMOL/L (ref 21–32)
CREAT SERPL-MCNC: 0.79 MG/DL (ref 0.5–1.05)
EGFRCR SERPLBLD CKD-EPI 2021: 86 ML/MIN/1.73M*2
EOSINOPHIL # BLD AUTO: 0.14 X10*3/UL (ref 0–0.7)
EOSINOPHIL NFR BLD AUTO: 1.8 %
ERYTHROCYTE [DISTWIDTH] IN BLOOD BY AUTOMATED COUNT: 14.4 % (ref 11.5–14.5)
GLUCOSE SERPL-MCNC: 109 MG/DL (ref 74–99)
HCT VFR BLD AUTO: 39.3 % (ref 36–46)
HGB BLD-MCNC: 12.5 G/DL (ref 12–16)
IMM GRANULOCYTES # BLD AUTO: 0.03 X10*3/UL (ref 0–0.7)
IMM GRANULOCYTES NFR BLD AUTO: 0.4 % (ref 0–0.9)
LYMPHOCYTES # BLD AUTO: 2.17 X10*3/UL (ref 1.2–4.8)
LYMPHOCYTES NFR BLD AUTO: 28.6 %
MCH RBC QN AUTO: 27.4 PG (ref 26–34)
MCHC RBC AUTO-ENTMCNC: 31.8 G/DL (ref 32–36)
MCV RBC AUTO: 86 FL (ref 80–100)
MONOCYTES # BLD AUTO: 0.62 X10*3/UL (ref 0.1–1)
MONOCYTES NFR BLD AUTO: 8.2 %
NEUTROPHILS # BLD AUTO: 4.53 X10*3/UL (ref 1.2–7.7)
NEUTROPHILS NFR BLD AUTO: 59.7 %
NRBC BLD-RTO: 0 /100 WBCS (ref 0–0)
PLATELET # BLD AUTO: 218 X10*3/UL (ref 150–450)
POTASSIUM SERPL-SCNC: 4.1 MMOL/L (ref 3.5–5.3)
PROT SERPL-MCNC: 5.3 G/DL (ref 6.4–8.2)
RBC # BLD AUTO: 4.56 X10*6/UL (ref 4–5.2)
SODIUM SERPL-SCNC: 141 MMOL/L (ref 136–145)
WBC # BLD AUTO: 7.6 X10*3/UL (ref 4.4–11.3)

## 2025-01-17 PROCEDURE — 82550 ASSAY OF CK (CPK): CPT | Performed by: INTERNAL MEDICINE

## 2025-01-17 PROCEDURE — 2500000004 HC RX 250 GENERAL PHARMACY W/ HCPCS (ALT 636 FOR OP/ED): Performed by: INTERNAL MEDICINE

## 2025-01-17 PROCEDURE — 99239 HOSP IP/OBS DSCHRG MGMT >30: CPT | Performed by: INTERNAL MEDICINE

## 2025-01-17 PROCEDURE — 85025 COMPLETE CBC W/AUTO DIFF WBC: CPT | Performed by: INTERNAL MEDICINE

## 2025-01-17 PROCEDURE — G0378 HOSPITAL OBSERVATION PER HR: HCPCS

## 2025-01-17 PROCEDURE — 36415 COLL VENOUS BLD VENIPUNCTURE: CPT | Performed by: INTERNAL MEDICINE

## 2025-01-17 PROCEDURE — 97116 GAIT TRAINING THERAPY: CPT | Mod: GP

## 2025-01-17 PROCEDURE — 2500000001 HC RX 250 WO HCPCS SELF ADMINISTERED DRUGS (ALT 637 FOR MEDICARE OP): Performed by: INTERNAL MEDICINE

## 2025-01-17 PROCEDURE — 97110 THERAPEUTIC EXERCISES: CPT | Mod: GP

## 2025-01-17 PROCEDURE — 96376 TX/PRO/DX INJ SAME DRUG ADON: CPT

## 2025-01-17 PROCEDURE — 80053 COMPREHEN METABOLIC PANEL: CPT | Performed by: INTERNAL MEDICINE

## 2025-01-17 RX ORDER — PREGABALIN 200 MG/1
200 CAPSULE ORAL 2 TIMES DAILY
Qty: 60 CAPSULE | Refills: 0 | Status: SHIPPED | OUTPATIENT
Start: 2025-01-17 | End: 2025-02-16

## 2025-01-17 RX ORDER — LANOLIN ALCOHOL/MO/W.PET/CERES
1000 CREAM (GRAM) TOPICAL DAILY
Qty: 100 TABLET | Refills: 0 | Status: SHIPPED | OUTPATIENT
Start: 2025-01-17 | End: 2025-04-27

## 2025-01-17 RX ORDER — LANOLIN ALCOHOL/MO/W.PET/CERES
100 CREAM (GRAM) TOPICAL DAILY
Qty: 100 TABLET | Refills: 0 | Status: SHIPPED | OUTPATIENT
Start: 2025-01-17 | End: 2025-04-27

## 2025-01-17 RX ORDER — ACETAMINOPHEN 325 MG/1
650 TABLET ORAL EVERY 4 HOURS PRN
Start: 2025-01-17

## 2025-01-17 RX ADMIN — ASPIRIN 81 MG: 81 TABLET, COATED ORAL at 09:52

## 2025-01-17 RX ADMIN — PANTOPRAZOLE SODIUM 40 MG: 40 TABLET, DELAYED RELEASE ORAL at 05:37

## 2025-01-17 RX ADMIN — PREGABALIN 200 MG: 100 CAPSULE ORAL at 09:53

## 2025-01-17 RX ADMIN — DULOXETINE HYDROCHLORIDE 30 MG: 30 CAPSULE, DELAYED RELEASE ORAL at 09:53

## 2025-01-17 RX ADMIN — THIAMINE HYDROCHLORIDE 500 MG: 100 INJECTION, SOLUTION INTRAMUSCULAR; INTRAVENOUS at 09:53

## 2025-01-17 ASSESSMENT — COGNITIVE AND FUNCTIONAL STATUS - GENERAL
CLIMB 3 TO 5 STEPS WITH RAILING: A LITTLE
MOBILITY SCORE: 22
WALKING IN HOSPITAL ROOM: A LITTLE

## 2025-01-17 ASSESSMENT — PAIN SCALES - GENERAL: PAINLEVEL_OUTOF10: 0 - NO PAIN

## 2025-01-17 ASSESSMENT — PAIN - FUNCTIONAL ASSESSMENT: PAIN_FUNCTIONAL_ASSESSMENT: 0-10

## 2025-01-17 NOTE — DISCHARGE SUMMARY
Discharge Diagnosis  Patient Active Problem List   Diagnosis    Allergic rhinitis    Anemia    Cervical disc disease    Chronic neck pain    Polyp of ascending colon    Depression with anxiety    Headache    Hyperlipidemia    Incontinence    Insomnia    Iron deficiency anemia    Greater trochanteric bursitis of left hip    Leg edema    Disorder of joint of pelvic region and thigh    Lumbosacral spondylosis without myelopathy    Calculus of kidney    Neurogenic bladder    Nicotine dependence    Opiate dependence, continuous (Multi)    Osteoarthritis of hip    Right thalamic infarction (Multi)    Shortness of breath    Cervical spondylosis without myelopathy    Synovial cyst of right knee    Tachycardia    Vaginal atrophy    Encounter for routine child health examination w/o abnormal findings    Body mass index (BMI) of 25.0 to 25.9 in adult    Cerebral infarction due to occlusion of small artery (Multi)    Colon polyposis    Sciatic pain    Tobacco abuse    Vitamin D deficiency    Atrial fibrillation (Multi)    Cerebrovascular accident (CVA) (Multi)    Brain concussion    Palpitations    Syncope and collapse    Sensation of chest tightness    Venous thrombosis    Otalgia    At risk for bleeding    Diplopia    Dizziness    Post concussion syndrome    Bilateral occipital neuralgia    COPD (chronic obstructive pulmonary disease) (Multi)    Conjugate gaze palsy    Contact with and (suspected) exposure to covid-19    Cough    Disease due to severe acute respiratory syndrome coronavirus 2 (SARS-CoV-2)    Essential (primary) hypertension    Myofascial pain dysfunction syndrome    Pain in throat    Sore throat    Poisoning by fentanyl or fentanyl analogs, accidental (unintentional), initial encounter (Multi)    Right lower quadrant abdominal pain    Vascular headache    Overweight with body mass index (BMI) 25.0-29.9    Cervicalgia    Chronic pain of left upper extremity    Pain of ear structure    Polyp of colon    Mixed  anxiety depressive disorder    Dizziness and giddiness    Greater trochanteric bursitis    Edema of lower extremity    Infarction of thalamus (Multi)    Oral lesion    Cervical radiculitis    Routine general medical examination at a health care facility    Colon cancer screening    Migraine without status migrainosus, not intractable, unspecified migraine type    Acute cystitis without hematuria    Hypoxia    Weakness    Ambulatory dysfunction    Nystagmus    History of stroke    Chronic pain syndrome     Ambulatory dysfunction  Prior stroke  Dehydration, orthostatic hypotension  Low normal B12 levels  Abnormal TSH    Issues Requiring Follow-Up  See instructions    Discharge Meds     Your medication list        ASK your doctor about these medications        Instructions Last Dose Given Next Dose Due   albuterol 90 mcg/actuation inhaler  Commonly known as: Ventolin HFA      Inhale 1 to 2 puffs by mouth and into the lungs every 4 to 6 hours if needed.       aspirin 81 mg EC tablet           atorvastatin 40 mg tablet  Commonly known as: Lipitor      take 1 tablet by mouth once daily       budesonide-formoteroL 80-4.5 mcg/actuation inhaler  Commonly known as: Symbicort      Inhale 2 puffs 2 times a day. Rinse mouth with water after use to reduce aftertaste and incidence of candidiasis. Do not swallow.       dexAMETHasone 2 mg tablet  Commonly known as: Decadron      Take 3 tablets (6 mg) by mouth once daily with breakfast. Do not fill before October 22, 2024.       DULoxetine 30 mg DR capsule  Commonly known as: Cymbalta           hydrOXYzine HCL 50 mg tablet  Commonly known as: Atarax           ipratropium-albuteroL 0.5-2.5 mg/3 mL nebulizer solution  Commonly known as: Duo-Neb      Take 3 mL by nebulization 4 times a day as needed for wheezing or shortness of breath.       pregabalin 150 mg capsule  Commonly known as: Lyrica      Take 1 capsule (150 mg) by mouth 3 times a day.       pregabalin 300 mg capsule  Commonly  known as: Lyrica           Vyvanse 40 mg capsule  Generic drug: lisdexamfetamine                    Test Results Pending At Discharge  Pending Labs       Order Current Status    Extra Urine Gray Tube Collected (01/15/25 1620)    Urinalysis with Reflex Culture and Microscopic In process            Hospital Course  This patient was admitted with an onset of complaints but basically difficulty ambulating with the chief complaint.  It appears that she has had a long standing history of difficulty ambulating.  She was admitted for workup of possible stroke and MRIs were negative neurology saw her and she had no further recommendations.  It was felt that her symptoms may have been due to very high Lyrica dose and dose was recommended to be decreased from 300 twice daily to 200 twice daily for itching going to give a new prescription.  Will continue oral thiamine, will start oral B12 which was within normal but lower range  She will follow-up with neurology outpatient  He did feel symptomatically better after fluid bolus was given and initially she was slightly orthostatic but that got better  Therapy worked with her and recommended outpatient home care  Patient is confident about discharge and is waiting for me to discharge her    Pertinent Physical Exam At Time of Discharge  Alert oriented x 3 cooperative no distress  Chest clear  Heart regular  Abdomen soft nontender  Extremities no edema  Neurologic exam unchanged nonfocal    Labs from today reviewed    Outpatient Follow-Up  Per chart    Time spent on discharge arrangement:  45-minute    Yo Enamorado MD

## 2025-01-17 NOTE — PROGRESS NOTES
Physical Therapy    Physical Therapy Treatment    Patient Name: Mona Sanders  MRN: [id number]  Department:   Room: [address] [place] [address]-  Today's Date: 1/17/2025  Time Calculation  Start Time: 1115  Stop Time: 1139  Time Calculation (min): 24 min         Assessment/Plan   PT Assessment  Barriers to Discharge Home: No anticipated barriers  Evaluation/Treatment Tolerance: Patient tolerated treatment well  Medical Staff Made Aware: Yes  Strengths: Housing layout, Premorbid level of function, Insight into problems  End of Session Communication: Bedside nurse  Assessment Comment: Pt demonstrated improved mobility and endurance during stair navigation and level surface ambulation with a walker and close supervision. Pt requesting home PT after DC to improve patient's functional independence.  End of Session Patient Position: Up in chair, Alarm on     PT Plan  Treatment/Interventions: Gait training, Transfer training, Stair training, Balance training, Endurance training, Therapeutic exercise, Therapeutic activity, Home exercise program  PT Plan: Ongoing PT  PT Frequency: 4 times per week  PT Discharge Recommendations: Low intensity level of continued care (24/7 supervision for safety)  Equipment Recommended upon Discharge:  (has walker at home)  PT Recommended Transfer Status: Contact guard  PT - OK to Discharge: Yes      General Visit Information:   PT  Visit  PT Received On: 01/17/25  Response to Previous Treatment: Patient with no complaints from previous session.  General  Reason for Referral: Impaired mobility, dizziness  Past Medical History Relevant to Rehab: poly-substance abuse, a-fib, CVA with residual visual deficits, HTN, HLD, COPD, anxiety, depression  Family/Caregiver Present: No  Prior to Session Communication: Bedside nurse  Patient Position Received: Bed, 2 rail up, Alarm off, not on at start of session  Preferred Learning Style: verbal, visual  General Comment: cleared by nursing and agreeable for  therapy    Subjective   Precautions:  Precautions  Medical Precautions: Fall precautions            Objective   Pain:  Pain Assessment  Pain Assessment: 0-10  0-10 (Numeric) Pain Score: 0 - No pain  Cognition:  Cognition  Orientation Level: Oriented X4  Insight: Mild  Impulsive: Mildly    Activity Tolerance:  Activity Tolerance  Endurance: Endurance does not limit participation in activity  Treatments:  Therapeutic Exercise  Therapeutic Exercise Performed: Yes  Therapeutic Exercise Activity 1: seated resisted LAQ x10ea, resisted hip abdx10, resisted hip addx10         Bed Mobility 1  Bed Mobility 1: Supine to sitting, Sitting to supine  Level of Assistance 1: Close supervision  Bed Mobility Comments 1: impulsive and ataxic    Ambulation/Gait Training 1  Surface 1: Level tile  Device 1: Rolling walker  Assistance 1: Close supervision  Quality of Gait 1: Ataxic, Decreased step length, Inconsistent stride length (lateral pelvic instability; required cueing to keep JOHN PAUL in walker and maintain focus on surrounding environment)  Comments/Distance (ft) 1: 2x200' (no seated rest needed inbetween)    Transfers  Transfer: Yes  Transfer 1  Transfer From 1: Bed to  Transfer to 1: Stand  Technique 1: Sit to stand  Transfer Level of Assistance 1: Close supervision    Stairs  Stairs: Yes  Stairs  Rails 1: None (Comment)  Device 1: No device  Assistance 1: Close supervision  Comment/Number of Steps 1: 4 steps up and down. Supervision for ataxic gait presentation         Outcome Measures:  [facility] Basic Mobility  Turning from your back to your side while in a flat bed without using bedrails: None  Moving from lying on your back to sitting on the side of a flat bed without using bedrails: None  Moving to and from bed to chair (including a wheelchair): None  Standing up from a chair using your arms (e.g. wheelchair or bedside chair): None  To walk in hospital room: A little  Climbing 3-5 steps with railing: A little  Basic Mobility -  Total Score: 22    Education Documentation  Precautions, taught by MIGUEL Strauss at 1/17/2025  1:12 PM.  Learner: Patient  Readiness: Acceptance  Method: Explanation, Demonstration  Response: Needs Reinforcement  Comment: Patient educated on proper body alignment and JOHN PAUL position while ambulating with a walker. Also educated on safe mobility techniques.    Body Mechanics, taught by MIGUEL Strauss at 1/17/2025  1:12 PM.  Learner: Patient  Readiness: Acceptance  Method: Explanation, Demonstration  Response: Needs Reinforcement  Comment: Patient educated on proper body alignment and JOHN PAUL position while ambulating with a walker. Also educated on safe mobility techniques.    Mobility Training, taught by MIGUEL Strauss at 1/17/2025  1:12 PM.  Learner: Patient  Readiness: Acceptance  Method: Explanation, Demonstration  Response: Needs Reinforcement  Comment: Patient educated on proper body alignment and JOHN PAUL position while ambulating with a walker. Also educated on safe mobility techniques.    Education Comments  No comments found.        OP EDUCATION:       Encounter Problems       Encounter Problems (Active)       Balance       dynamic (Progressing)       Start:  01/16/25    Expected End:  01/30/25       Pt will perform Tinetti assessment and score >/= 24/28, resulting in a low falls risk             Mobility       LTG - Patient will navigate 4-6 steps with rails/device (Progressing)       Start:  01/16/25    Expected End:  01/30/25            ambulation (Progressing)       Start:  01/16/25    Expected End:  01/30/25       Pt will amb > 250  ft with wheeled walker and supervision            PT Transfers       sit to stand (Progressing)       Start:  01/16/25    Expected End:  01/30/25       Pt will perform sit to stand transfer with LRAD and mod independence.             Pain - Adult          Safety       LTG - Patient will utilize safety techniques (Progressing)       Start:  01/16/25    Expected  End:  01/30/25

## 2025-01-17 NOTE — HH CARE COORDINATION
Home Care received a Referral for Nursing, Physical Therapy, and Occupational Therapy. We have processed the referral for a Start of Care on 01/18-01/19.     If you have any questions or concerns, please feel free to contact us at [phone]. Follow the prompts, enter your five digit zip code, and you will be directed to your care team on [facility] 1.

## 2025-01-17 NOTE — PROGRESS NOTES
01/17/25 0924   Discharge Planning   Home or Post Acute Services In home services  (Patient agreeable to [facility].  INTERNAL referral needed for home care at d/c.  Care Transitions following for updates/changes to discharge needs/plan.  [facility] advised of upcoming referral.)   Type of Home Care Services Home nursing visits;Home OT;Home PT   Expected Discharge Disposition Home H   Does the patient need discharge transport arranged? No     1440  Per chart review, SOC 24-48 hrs (1/18/25 - 1/19/25).

## 2025-01-20 ENCOUNTER — PATIENT OUTREACH (OUTPATIENT)
Dept: PRIMARY CARE | Facility: CLINIC | Age: 60
End: 2025-01-20
Payer: COMMERCIAL

## 2025-01-20 NOTE — PROGRESS NOTES
Discharge Facility: [facility]  Discharge Diagnosis:Dizziness dehydration orthostatic hypotension  Admission Date:1/15/25  Discharge Date: 1/17/25    PCP Appointment Date:none made sent to pcp,office  Specialist Appointment Date:   Hospital Encounter and Summary Linked: Yes  See discharge assessment below for further details  Two attempts were made to reach patient within two business days after discharge. Voicemail left with contact information for patient to call back with any non-emergent questions or concerns.

## 2025-01-21 ENCOUNTER — HOME CARE VISIT (OUTPATIENT)
Dept: HOME HEALTH SERVICES | Facility: HOME HEALTH | Age: 60
End: 2025-01-21
Payer: COMMERCIAL

## 2025-01-21 VITALS
SYSTOLIC BLOOD PRESSURE: 100 MMHG | RESPIRATION RATE: 16 BRPM | OXYGEN SATURATION: 94 % | DIASTOLIC BLOOD PRESSURE: 80 MMHG | HEART RATE: 88 BPM | TEMPERATURE: 97.9 F

## 2025-01-21 PROCEDURE — G0299 HHS/HOSPICE OF RN EA 15 MIN: HCPCS

## 2025-01-21 ASSESSMENT — LIFESTYLE VARIABLES
FAMILY HISTORY ILLEGAL DRUGS: 1
SMOKING_STATUS: 1

## 2025-01-21 ASSESSMENT — ENCOUNTER SYMPTOMS
SPUTUM COLOR: YELLOW
PAIN: 1
PAIN LOCATION: LEFT HIP
HIGHEST PAIN SEVERITY IN PAST 24 HOURS: 10/10
PERSON REPORTING PAIN: PATIENT
PAIN LOCATION - PAIN SEVERITY: 6/10
SPUTUM CONSISTENCY: THICK
PAIN LOCATION - PAIN DURATION: CONSTANT
TINGLING: 1
BOWEL PATTERN NORMAL: 1
LAST BOWEL MOVEMENT: 67222
OCCASIONAL FEELINGS OF UNSTEADINESS: 1
DEPRESSED MOOD: 1
SHORTNESS OF BREATH: 1
PAIN LOCATION - RELIEVING FACTORS: WARMTH
NAUSEA: 1
DRY SKIN: 1
HEADACHES: 1
SUBJECTIVE PAIN PROGRESSION: UNCHANGED
PAIN LOCATION - PAIN SEVERITY: 6/10
RHINORRHEA: 1
PAIN LOCATION - PAIN FREQUENCY: CONSTANT
SPUTUM PRODUCTION: 1
PAIN LOCATION - PAIN QUALITY: ACHE
FATIGUE: 1
COUGH: 1
PAIN LOCATION: RIGHT HIP
LOWEST PAIN SEVERITY IN PAST 24 HOURS: 4/10
STOOL FREQUENCY: LESS THAN DAILY
DYSPNEA ACTIVITY LEVEL: AFTER AMBULATING 10 - 20 FT

## 2025-01-21 ASSESSMENT — ACTIVITIES OF DAILY LIVING (ADL)
OASIS_M1830: 03
AMBULATION ASSISTANCE: STAND BY ASSIST
AMBULATION ASSISTANCE: 1
ENTERING_EXITING_HOME: MINIMUM ASSIST

## 2025-01-23 ENCOUNTER — HOME CARE VISIT (OUTPATIENT)
Dept: HOME HEALTH SERVICES | Facility: HOME HEALTH | Age: 60
End: 2025-01-23
Payer: COMMERCIAL

## 2025-01-23 VITALS
SYSTOLIC BLOOD PRESSURE: 102 MMHG | HEART RATE: 84 BPM | TEMPERATURE: 97.5 F | DIASTOLIC BLOOD PRESSURE: 66 MMHG | OXYGEN SATURATION: 94 % | RESPIRATION RATE: 22 BRPM

## 2025-01-23 VITALS — TEMPERATURE: 97.6 F | RESPIRATION RATE: 18 BRPM | HEART RATE: 78 BPM | OXYGEN SATURATION: 96 %

## 2025-01-23 PROCEDURE — G0151 HHCP-SERV OF PT,EA 15 MIN: HCPCS

## 2025-01-23 PROCEDURE — G0152 HHCP-SERV OF OT,EA 15 MIN: HCPCS

## 2025-01-23 ASSESSMENT — PAIN SCALES - PAIN ASSESSMENT IN ADVANCED DEMENTIA (PAINAD)
TOTALSCORE: 0
CONSOLABILITY: 0 - NO NEED TO CONSOLE.
NEGVOCALIZATION: 0 - NONE.
FACIALEXPRESSION: 0
BODYLANGUAGE: 0 - RELAXED.
BREATHING: 0
CONSOLABILITY: 0
BODYLANGUAGE: 0
NEGVOCALIZATION: 0
FACIALEXPRESSION: 0 - SMILING OR INEXPRESSIVE.

## 2025-01-23 ASSESSMENT — ENCOUNTER SYMPTOMS
SUBJECTIVE PAIN PROGRESSION: GRADUALLY IMPROVING
PAIN: 1
HIGHEST PAIN SEVERITY IN PAST 24 HOURS: 5/10
PAIN SEVERITY GOAL: 0/10
LOWEST PAIN SEVERITY IN PAST 24 HOURS: 4/10
PERSON REPORTING PAIN: PATIENT
LOWEST PAIN SEVERITY IN PAST 24 HOURS: 2/10
OCCASIONAL FEELINGS OF UNSTEADINESS: 1
HIGHEST PAIN SEVERITY IN PAST 24 HOURS: 8/10
PERSON REPORTING PAIN: PATIENT
PAIN: 1

## 2025-01-23 ASSESSMENT — ACTIVITIES OF DAILY LIVING (ADL)
AMBULATION_DISTANCE/DURATION_TOLERATED: 150 FT.
FEEDING_WITHIN_DEFINED_LIMITS: 1
BATHING ASSESSED: 1
GROOMING_WITHIN_DEFINED_LIMITS: 1
AMBULATION ASSISTANCE ON FLAT SURFACES: 1
BATHING_CURRENT_FUNCTION: MINIMUM ASSIST

## 2025-01-27 ENCOUNTER — HOME CARE VISIT (OUTPATIENT)
Dept: HOME HEALTH SERVICES | Facility: HOME HEALTH | Age: 60
End: 2025-01-27
Payer: COMMERCIAL

## 2025-01-28 ENCOUNTER — APPOINTMENT (OUTPATIENT)
Dept: PRIMARY CARE | Facility: CLINIC | Age: 60
End: 2025-01-28
Payer: COMMERCIAL

## 2025-01-29 ENCOUNTER — HOME CARE VISIT (OUTPATIENT)
Dept: HOME HEALTH SERVICES | Facility: HOME HEALTH | Age: 60
End: 2025-01-29
Payer: COMMERCIAL

## 2025-01-29 ENCOUNTER — PATIENT OUTREACH (OUTPATIENT)
Dept: PRIMARY CARE | Facility: CLINIC | Age: 60
End: 2025-01-29
Payer: COMMERCIAL

## 2025-01-29 VITALS
DIASTOLIC BLOOD PRESSURE: 80 MMHG | HEART RATE: 84 BPM | OXYGEN SATURATION: 93 % | TEMPERATURE: 97.4 F | RESPIRATION RATE: 20 BRPM | SYSTOLIC BLOOD PRESSURE: 112 MMHG

## 2025-01-29 PROCEDURE — G0299 HHS/HOSPICE OF RN EA 15 MIN: HCPCS

## 2025-01-29 PROCEDURE — G0152 HHCP-SERV OF OT,EA 15 MIN: HCPCS

## 2025-01-29 PROCEDURE — G0157 HHC PT ASSISTANT EA 15: HCPCS

## 2025-01-29 ASSESSMENT — PAIN SCALES - PAIN ASSESSMENT IN ADVANCED DEMENTIA (PAINAD)
NEGVOCALIZATION: 0 - NONE.
BODYLANGUAGE: 0
NEGVOCALIZATION: 0
FACIALEXPRESSION: 0
CONSOLABILITY: 0
FACIALEXPRESSION: 0 - SMILING OR INEXPRESSIVE.
BODYLANGUAGE: 0 - RELAXED.
TOTALSCORE: 0
CONSOLABILITY: 0 - NO NEED TO CONSOLE.
BREATHING: 0

## 2025-01-29 ASSESSMENT — ENCOUNTER SYMPTOMS
COUGH: 1
PAIN LOCATION - PAIN SEVERITY: 8/10
DYSPNEA ACTIVITY LEVEL: AFTER AMBULATING 10 - 20 FT
SPUTUM COLOR: CLEAR
PAIN LOCATION - RELIEVING FACTORS: REST
OCCASIONAL FEELINGS OF UNSTEADINESS: 0
DENIES PAIN: 1
PAIN: 1
HIGHEST PAIN SEVERITY IN PAST 24 HOURS: 10/10
APPETITE LEVEL: GOOD
PAIN LOCATION - PAIN QUALITY: BURN
PAIN LOCATION: NECK
PAIN LOCATION - PAIN DURATION: CONSTANT
DIZZINESS: 1
FORGETFULNESS: 1
SUBJECTIVE PAIN PROGRESSION: UNCHANGED
COUGH CHARACTERISTICS: PRODUCTIVE
HEADACHES: 1
DRY SKIN: 1
LOWEST PAIN SEVERITY IN PAST 24 HOURS: 3/10
PAIN SEVERITY GOAL: 0/10
SPUTUM PRODUCTION: 1
PAIN LOCATION - RELIEVING FACTORS: HEAT
SHORTNESS OF BREATH: 1
PAIN LOCATION - EXACERBATING FACTORS: CERTAIN MOVEMENT
PAIN LOCATION - EXACERBATING FACTORS: WALKING
PAIN LOCATION - PAIN FREQUENCY: CONSTANT
LOSS OF SENSATION IN FEET: 0
FATIGUE: 1
MUSCLE WEAKNESS: 1
DEPRESSION: 0
PAIN LOCATION - PAIN SEVERITY: 8/10
SPUTUM CONSISTENCY: THICK
PAIN LOCATION - PAIN FREQUENCY: INTERMITTENT
PAIN LOCATION - PAIN DURATION: VARIES
PAIN LOCATION: LEFT HIP
DESCRIPTION OF MEMORY LOSS: SHORT TERM
PERSON REPORTING PAIN: PATIENT
CHANGE IN APPETITE: UNCHANGED
PAIN LOCATION - PAIN QUALITY: STABBING

## 2025-01-29 ASSESSMENT — ACTIVITIES OF DAILY LIVING (ADL)
AMBULATION ASSISTANCE: STAND BY ASSIST
MONEY MANAGEMENT (EXPENSES/BILLS): NEEDS ASSISTANCE
AMBULATION ASSISTANCE: 1

## 2025-02-02 VITALS
DIASTOLIC BLOOD PRESSURE: 70 MMHG | OXYGEN SATURATION: 93 % | HEART RATE: 98 BPM | SYSTOLIC BLOOD PRESSURE: 122 MMHG | RESPIRATION RATE: 16 BRPM | TEMPERATURE: 97 F

## 2025-02-02 SDOH — HEALTH STABILITY: PHYSICAL HEALTH: EXERCISE ACTIVITIES SETS: 1

## 2025-02-02 SDOH — HEALTH STABILITY: PHYSICAL HEALTH: EXERCISE TYPE: BLE EXS

## 2025-02-02 SDOH — HEALTH STABILITY: PHYSICAL HEALTH: EXERCISE ACTIVITY: APS, LAQS, MARCHES HIP ABD/ADD

## 2025-02-02 SDOH — HEALTH STABILITY: PHYSICAL HEALTH: PHYSICAL EXERCISE: SEATED

## 2025-02-02 SDOH — HEALTH STABILITY: PHYSICAL HEALTH: EXERCISE ACTIVITY: CALF RAISES, MARCHES, HIP ABD, EXT/ KNEE FLEX

## 2025-02-02 SDOH — HEALTH STABILITY: PHYSICAL HEALTH: PHYSICAL EXERCISE: 12

## 2025-02-02 SDOH — HEALTH STABILITY: PHYSICAL HEALTH: PHYSICAL EXERCISE: 15

## 2025-02-02 SDOH — HEALTH STABILITY: PHYSICAL HEALTH: PHYSICAL EXERCISE: STANDING

## 2025-02-02 ASSESSMENT — ENCOUNTER SYMPTOMS
SUBJECTIVE PAIN PROGRESSION: UNCHANGED
PAIN LOCATION: NECK
PAIN LOCATION - EXACERBATING FACTORS: ACTIVITY
PAIN LOCATION - PAIN SEVERITY: 7/10
PAIN LOCATION: LEFT HIP
PAIN LOCATION - PAIN SEVERITY: 5/10
PAIN LOCATION: RIGHT HIP
LOWEST PAIN SEVERITY IN PAST 24 HOURS: 7/10
PAIN LOCATION - PAIN QUALITY: ACHING
PAIN LOCATION - PAIN SEVERITY: 7/10
HIGHEST PAIN SEVERITY IN PAST 24 HOURS: 5/10
PAIN SEVERITY GOAL: 0/10
PAIN LOCATION - PAIN FREQUENCY: INTERMITTENT
PERSON REPORTING PAIN: PATIENT
PAIN: 1

## 2025-02-02 ASSESSMENT — ACTIVITIES OF DAILY LIVING (ADL)
PHYSICAL TRANSFERS ASSESSED: 1
AMBULATION ASSISTANCE: SUPERVISION
AMBULATION_DISTANCE/DURATION_TOLERATED: 70'
AMBULATION ASSISTANCE ON FLAT SURFACES: 1
CURRENT_FUNCTION: SUPERVISION
AMBULATION ASSISTANCE: 1

## 2025-02-04 ENCOUNTER — APPOINTMENT (OUTPATIENT)
Dept: PRIMARY CARE | Facility: CLINIC | Age: 60
End: 2025-02-04
Payer: COMMERCIAL

## 2025-02-04 PROBLEM — M46.1 SACROILIITIS (CMS-HCC): Status: ACTIVE | Noted: 2024-07-09

## 2025-02-04 PROBLEM — N39.0 URINARY TRACT INFECTION: Status: ACTIVE | Noted: 2025-02-04

## 2025-02-04 PROBLEM — M47.812 SPONDYLOSIS OF CERVICAL SPINE WITHOUT MYELOPATHY: Status: ACTIVE | Noted: 2025-02-04

## 2025-02-04 PROBLEM — M54.16 LUMBAR RADICULOPATHY: Status: ACTIVE | Noted: 2024-05-14

## 2025-02-04 PROBLEM — M48.061 SPINAL STENOSIS OF LUMBAR REGION WITHOUT NEUROGENIC CLAUDICATION: Status: ACTIVE | Noted: 2024-06-11

## 2025-02-04 PROBLEM — M25.569 KNEE PAIN: Status: ACTIVE | Noted: 2025-02-04

## 2025-02-04 PROBLEM — G43.909 MIGRAINE WITHOUT STATUS MIGRAINOSUS, NOT INTRACTABLE: Status: ACTIVE | Noted: 2024-10-17

## 2025-02-04 PROBLEM — M47.816 SPONDYLOSIS OF LUMBAR REGION WITHOUT MYELOPATHY OR RADICULOPATHY: Status: ACTIVE | Noted: 2024-07-09

## 2025-02-04 PROBLEM — M54.17 LUMBOSACRAL RADICULOPATHY: Status: ACTIVE | Noted: 2024-05-23

## 2025-02-05 ENCOUNTER — HOME CARE VISIT (OUTPATIENT)
Dept: HOME HEALTH SERVICES | Facility: HOME HEALTH | Age: 60
End: 2025-02-05

## 2025-02-06 ENCOUNTER — HOME CARE VISIT (OUTPATIENT)
Dept: HOME HEALTH SERVICES | Facility: HOME HEALTH | Age: 60
End: 2025-02-06
Payer: COMMERCIAL

## 2025-02-06 VITALS
RESPIRATION RATE: 18 BRPM | OXYGEN SATURATION: 94 % | HEART RATE: 96 BPM | DIASTOLIC BLOOD PRESSURE: 98 MMHG | SYSTOLIC BLOOD PRESSURE: 118 MMHG | TEMPERATURE: 97.4 F

## 2025-02-06 PROCEDURE — G0299 HHS/HOSPICE OF RN EA 15 MIN: HCPCS

## 2025-02-06 ASSESSMENT — ENCOUNTER SYMPTOMS
DEPRESSION: 0
LAST BOWEL MOVEMENT: 67241
SPUTUM PRODUCTION: 1
DRY SKIN: 1
LOSS OF SENSATION IN FEET: 0
SORE THROAT: 1
COUGH: 1
APPETITE LEVEL: FAIR
HEADACHES: 1
FORGETFULNESS: 1
DENIES PAIN: 1
SPUTUM CONSISTENCY: THICK
DESCRIPTION OF MEMORY LOSS: SHORT TERM
FATIGUE: 1
MUSCLE WEAKNESS: 1
SPUTUM COLOR: CLEAR
PERSON REPORTING PAIN: PATIENT
OCCASIONAL FEELINGS OF UNSTEADINESS: 1
DIARRHEA: 1

## 2025-02-06 ASSESSMENT — ACTIVITIES OF DAILY LIVING (ADL)
AMBULATION ASSISTANCE: 1
MONEY MANAGEMENT (EXPENSES/BILLS): INDEPENDENT
AMBULATION ASSISTANCE: STAND BY ASSIST

## 2025-02-12 ENCOUNTER — HOME CARE VISIT (OUTPATIENT)
Dept: HOME HEALTH SERVICES | Facility: HOME HEALTH | Age: 60
End: 2025-02-12
Payer: COMMERCIAL

## 2025-02-13 ENCOUNTER — PATIENT OUTREACH (OUTPATIENT)
Dept: PRIMARY CARE | Facility: CLINIC | Age: 60
End: 2025-02-13
Payer: COMMERCIAL

## 2025-02-14 ENCOUNTER — HOME CARE VISIT (OUTPATIENT)
Dept: HOME HEALTH SERVICES | Facility: HOME HEALTH | Age: 60
End: 2025-02-14
Payer: COMMERCIAL

## 2025-02-14 VITALS
RESPIRATION RATE: 18 BRPM | SYSTOLIC BLOOD PRESSURE: 122 MMHG | OXYGEN SATURATION: 95 % | TEMPERATURE: 97.4 F | DIASTOLIC BLOOD PRESSURE: 84 MMHG | HEART RATE: 84 BPM

## 2025-02-14 PROCEDURE — G0299 HHS/HOSPICE OF RN EA 15 MIN: HCPCS

## 2025-02-14 ASSESSMENT — ENCOUNTER SYMPTOMS
DENIES PAIN: 1
OCCASIONAL FEELINGS OF UNSTEADINESS: 0
COUGH: 1
FORGETFULNESS: 1
PERSON REPORTING PAIN: PATIENT
FATIGUE: 1
SPUTUM AMOUNT: SCANT
SORE THROAT: 1
SPUTUM PRODUCTION: 1
LOSS OF SENSATION IN FEET: 0
SPUTUM COLOR: CLEAR
COUGH CHARACTERISTICS: PRODUCTIVE
APPETITE LEVEL: FAIR
DEPRESSION: 0
DRY SKIN: 1
DIZZINESS: 1
MUSCLE WEAKNESS: 1

## 2025-02-14 ASSESSMENT — ACTIVITIES OF DAILY LIVING (ADL)
OASIS_M1830: 00
HOME_HEALTH_OASIS: 00
AMBULATION ASSISTANCE: INDEPENDENT
AMBULATION ASSISTANCE: 1

## 2025-03-05 ENCOUNTER — APPOINTMENT (OUTPATIENT)
Dept: PRIMARY CARE | Facility: CLINIC | Age: 60
End: 2025-03-05
Payer: COMMERCIAL

## 2025-03-05 VITALS
BODY MASS INDEX: 28.25 KG/M2 | WEIGHT: 175 LBS | SYSTOLIC BLOOD PRESSURE: 100 MMHG | OXYGEN SATURATION: 96 % | HEART RATE: 94 BPM | DIASTOLIC BLOOD PRESSURE: 70 MMHG | TEMPERATURE: 97 F

## 2025-03-05 DIAGNOSIS — R53.1 WEAKNESS: ICD-10-CM

## 2025-03-05 DIAGNOSIS — F11.20 OPIATE DEPENDENCE, CONTINUOUS (MULTI): ICD-10-CM

## 2025-03-05 DIAGNOSIS — E66.3 OVERWEIGHT WITH BODY MASS INDEX (BMI) 25.0-29.9: ICD-10-CM

## 2025-03-05 DIAGNOSIS — E55.9 VITAMIN D DEFICIENCY: ICD-10-CM

## 2025-03-05 DIAGNOSIS — R53.82 CHRONIC FATIGUE: Primary | ICD-10-CM

## 2025-03-05 PROCEDURE — 99213 OFFICE O/P EST LOW 20 MIN: CPT | Performed by: FAMILY MEDICINE

## 2025-03-05 PROCEDURE — 96372 THER/PROPH/DIAG INJ SC/IM: CPT | Performed by: FAMILY MEDICINE

## 2025-03-05 PROCEDURE — 3078F DIAST BP <80 MM HG: CPT | Performed by: FAMILY MEDICINE

## 2025-03-05 PROCEDURE — 3074F SYST BP LT 130 MM HG: CPT | Performed by: FAMILY MEDICINE

## 2025-03-05 RX ORDER — CYANOCOBALAMIN 1000 UG/ML
1000 INJECTION, SOLUTION INTRAMUSCULAR; SUBCUTANEOUS ONCE
Status: COMPLETED | OUTPATIENT
Start: 2025-03-05 | End: 2025-03-05

## 2025-03-05 RX ORDER — PREGABALIN 300 MG/1
1 CAPSULE ORAL
COMMUNITY
Start: 2025-02-13

## 2025-03-05 RX ADMIN — CYANOCOBALAMIN 1000 MCG: 1000 INJECTION, SOLUTION INTRAMUSCULAR; SUBCUTANEOUS at 14:44

## 2025-03-05 ASSESSMENT — PAIN SCALES - GENERAL: PAINLEVEL_OUTOF10: 7

## 2025-03-06 DIAGNOSIS — E78.5 HYPERLIPIDEMIA, UNSPECIFIED HYPERLIPIDEMIA TYPE: ICD-10-CM

## 2025-03-06 PROBLEM — J02.9 SORE THROAT: Status: RESOLVED | Noted: 2024-03-21 | Resolved: 2025-03-06

## 2025-03-06 PROBLEM — R42 DIZZINESS: Status: RESOLVED | Noted: 2024-01-22 | Resolved: 2025-03-06

## 2025-03-06 PROBLEM — R07.0 PAIN IN THROAT: Status: RESOLVED | Noted: 2024-03-21 | Resolved: 2025-03-06

## 2025-03-06 PROBLEM — U07.1 DISEASE DUE TO SEVERE ACUTE RESPIRATORY SYNDROME CORONAVIRUS 2 (SARS-COV-2): Status: RESOLVED | Noted: 2024-03-21 | Resolved: 2025-03-06

## 2025-03-06 PROBLEM — N39.0 URINARY TRACT INFECTION: Status: RESOLVED | Noted: 2025-02-04 | Resolved: 2025-03-06

## 2025-03-06 PROBLEM — D64.9 ANEMIA: Status: RESOLVED | Noted: 2023-06-02 | Resolved: 2025-03-06

## 2025-03-06 PROBLEM — K63.5 COLON POLYPOSIS: Status: RESOLVED | Noted: 2023-08-31 | Resolved: 2025-03-06

## 2025-03-06 PROBLEM — Z20.822 CONTACT WITH AND (SUSPECTED) EXPOSURE TO COVID-19: Status: RESOLVED | Noted: 2022-05-12 | Resolved: 2025-03-06

## 2025-03-06 PROBLEM — Z00.129 ENCOUNTER FOR ROUTINE CHILD HEALTH EXAMINATION W/O ABNORMAL FINDINGS: Status: RESOLVED | Noted: 2023-06-05 | Resolved: 2025-03-06

## 2025-03-06 PROBLEM — F41.8 DEPRESSION WITH ANXIETY: Status: RESOLVED | Noted: 2023-06-02 | Resolved: 2025-03-06

## 2025-03-06 PROBLEM — K63.5 POLYP OF COLON: Status: RESOLVED | Noted: 2023-08-31 | Resolved: 2025-03-06

## 2025-03-06 PROBLEM — R53.82 CHRONIC FATIGUE: Status: ACTIVE | Noted: 2025-01-15

## 2025-03-06 PROBLEM — R10.31 RIGHT LOWER QUADRANT ABDOMINAL PAIN: Status: RESOLVED | Noted: 2022-05-12 | Resolved: 2025-03-06

## 2025-03-06 PROBLEM — N30.00 ACUTE CYSTITIS WITHOUT HEMATURIA: Status: RESOLVED | Noted: 2024-10-18 | Resolved: 2025-03-06

## 2025-03-06 PROBLEM — H92.09 PAIN OF EAR STRUCTURE: Status: RESOLVED | Noted: 2023-08-31 | Resolved: 2025-03-06

## 2025-03-06 PROBLEM — R07.89 SENSATION OF CHEST TIGHTNESS: Status: RESOLVED | Noted: 2023-08-31 | Resolved: 2025-03-06

## 2025-03-06 RX ORDER — ATORVASTATIN CALCIUM 40 MG/1
40 TABLET, FILM COATED ORAL DAILY
Qty: 90 TABLET | Refills: 3 | Status: SHIPPED | OUTPATIENT
Start: 2025-03-06 | End: 2026-03-06

## 2025-03-06 ASSESSMENT — ENCOUNTER SYMPTOMS
FEVER: 0
CHILLS: 0
UNEXPECTED WEIGHT CHANGE: 0
NAUSEA: 0
SINUS PAIN: 0
HEADACHES: 1
NECK PAIN: 1
COUGH: 0
ABDOMINAL PAIN: 0
ARTHRALGIAS: 1
HALLUCINATIONS: 0
BLOOD IN STOOL: 0
SHORTNESS OF BREATH: 0
WEAKNESS: 0
HEMATURIA: 0
FATIGUE: 1
EYE PAIN: 0
TROUBLE SWALLOWING: 0
DIARRHEA: 0
VOMITING: 0
DYSURIA: 0
JOINT SWELLING: 0
BACK PAIN: 1
FREQUENCY: 0
CONFUSION: 0
DECREASED CONCENTRATION: 0
PALPITATIONS: 0
DIZZINESS: 1
NUMBNESS: 0
SORE THROAT: 0

## 2025-03-06 NOTE — PATIENT INSTRUCTIONS
It was nice to see you today!  Discussed current concerns and addressed   Reviewed recent labs and diagnostics  Reviewed medications list  Continue to eat a healthy diet, exercise at least 3 times a week or more  Plan and follow up discussed  For any further information related to your condition, copy and paste or go to [email or web address]  Patient requested B12 shot as it has helped fatigue in past  Restart lipitor.  No evidence of rhabdomyolysis.  Recheck labs in about a month.

## 2025-03-06 NOTE — PROGRESS NOTES
Subjective   Patient ID: Mona Sanders is a 59 y.o. female.    Patient comes in today for hospital follow-up.  She was admitted on January 15 for what sounds like dehydration and hypotension, abnormal TSH and trouble walking due to weakness in the legs.  Troponins were unremarkable.  Magnesium was normal.  TSH was suppressed slightly but free T4 was normal.  Creatinine kinase levels remained normal.  She is feeling much better now.  She is staying hydrated with water and I suggested electrolytes.  No dizziness.  She is fatigued and would like a B12 shot as it has helped in the past.  She is a smoker.  They stopped Lipitor in case there was rhabdomyolysisBut CK levels were normal.        Review of Systems   Constitutional:  Positive for fatigue. Negative for chills, fever and unexpected weight change.   HENT:  Negative for congestion, ear pain, hearing loss, postnasal drip, sinus pain, sore throat and trouble swallowing.    Eyes:  Negative for pain and visual disturbance.   Respiratory:  Negative for cough and shortness of breath.    Cardiovascular:  Negative for chest pain, palpitations and leg swelling.   Gastrointestinal:  Negative for abdominal pain, blood in stool, diarrhea, nausea and vomiting.   Genitourinary:  Negative for dysuria, frequency, hematuria and urgency.   Musculoskeletal:  Positive for arthralgias, back pain and neck pain. Negative for joint swelling.   Skin:  Negative for pallor and rash.   Neurological:  Positive for dizziness and headaches. Negative for syncope, weakness and numbness.   Psychiatric/Behavioral:  Negative for confusion, decreased concentration, hallucinations and suicidal ideas.      Vitals:    03/05/25 1403   BP: 100/70   Pulse: 94   Temp: 36.1 °C (97 °F)   SpO2: 96%      Body mass index is 28.25 kg/m².  Objective   Physical Exam  Constitutional:       Appearance: Normal appearance.   HENT:      Head: Normocephalic and atraumatic.      Right Ear: Tympanic membrane and  external ear normal.      Left Ear: Tympanic membrane and external ear normal.      Nose: Nose normal.      Mouth/Throat:      Mouth: Mucous membranes are moist.      Pharynx: Oropharynx is clear. No oropharyngeal exudate.   Eyes:      Extraocular Movements: Extraocular movements intact.      Conjunctiva/sclera: Conjunctivae normal.      Pupils: Pupils are equal, round, and reactive to light.   Cardiovascular:      Rate and Rhythm: Normal rate and regular rhythm.      Heart sounds: Normal heart sounds.   Pulmonary:      Effort: Pulmonary effort is normal.      Breath sounds: Normal breath sounds.   Abdominal:      General: Abdomen is flat.      Palpations: Abdomen is soft. There is no mass.      Tenderness: There is no abdominal tenderness. There is no guarding.   Musculoskeletal:      Cervical back: Neck supple.      Right lower leg: No edema.      Left lower leg: No edema.   Lymphadenopathy:      Cervical: No cervical adenopathy.   Skin:     General: Skin is warm and dry.   Neurological:      General: No focal deficit present.      Mental Status: She is alert.   Psychiatric:         Mood and Affect: Mood normal.         Speech: Speech normal.         Behavior: Behavior normal.         Cognition and Memory: Cognition normal.         Last Labs:     CMP:   Lab Results   Component Value Date    CALCIUM 8.4 (L) 01/17/2025    CALCIUM 8.4 (L) 01/16/2025    PHOS 2.9 06/03/2020    PROT 5.3 (L) 01/17/2025    PROT 5.5 (L) 01/16/2025    ALBUMIN 3.4 01/17/2025    ALBUMIN 3.7 01/16/2025    AST 12 01/17/2025    AST 16 01/16/2025    ALKPHOS 85 01/17/2025    ALKPHOS 93 01/16/2025    BILITOT 0.3 01/17/2025    BILITOT 0.3 01/16/2025     CBC:   Lab Results   Component Value Date    WBC 7.6 01/17/2025    WBC 8.8 01/16/2025    HGB 12.5 01/17/2025    HGB 12.8 01/16/2025    HCT 39.3 01/17/2025    HCT 39.8 01/16/2025    MCV 86 01/17/2025    MCV 85 01/16/2025     01/17/2025     01/16/2025     A1C:   Lab Results   Component  "Value Date    HGBA1C 5.5 01/15/2025    HGBA1C 5.8 (H) 01/22/2024     LIPID PANEL:   Lab Results   Component Value Date    CHOL 101 01/15/2025    CHOL 119 08/12/2022    CHOL 97 09/22/2021    TRIG 69 01/15/2025    TRIG 88 08/12/2022    TRIG 106 09/22/2021    HDL 48.0 01/15/2025    HDL 47.5 08/12/2022    HDL 42.1 09/22/2021    CHHDL 2.1 01/15/2025    CHHDL 2.5 08/12/2022    CHHDL 2.3 09/22/2021    CHHDL 2.8 06/25/2019    LDLF 54 08/12/2022    LDLF 34 09/22/2021    VLDL 14 01/15/2025    VLDL 18 08/12/2022    VLDL 21 09/22/2021    NHDL 53 01/15/2025     TSH:   Lab Results   Component Value Date    TSH 0.31 (L) 01/15/2025    TSH 0.81 08/10/2022    TSH 1.88 09/22/2021    TSH 1.88 06/25/2019     PSA:   No results found for: \"PSA\"     Assessment/Plan   Diagnoses and all orders for this visit:  Chronic fatigue  -     cyanocobalamin (Vitamin B-12) injection 1,000 mcg      "

## 2025-03-14 ENCOUNTER — PROCEDURE VISIT (OUTPATIENT)
Dept: OBSTETRICS AND GYNECOLOGY | Facility: CLINIC | Age: 60
End: 2025-03-14
Payer: COMMERCIAL

## 2025-03-14 VITALS
SYSTOLIC BLOOD PRESSURE: 109 MMHG | HEART RATE: 86 BPM | WEIGHT: 180 LBS | DIASTOLIC BLOOD PRESSURE: 77 MMHG | BODY MASS INDEX: 29.05 KG/M2

## 2025-03-14 DIAGNOSIS — N32.81 OAB (OVERACTIVE BLADDER): ICD-10-CM

## 2025-03-14 LAB
POC APPEARANCE, URINE: CLEAR
POC BILIRUBIN, URINE: NEGATIVE
POC BLOOD, URINE: NEGATIVE
POC COLOR, URINE: YELLOW
POC GLUCOSE, URINE: NEGATIVE MG/DL
POC KETONES, URINE: NEGATIVE MG/DL
POC LEUKOCYTES, URINE: NEGATIVE
POC NITRITE,URINE: NEGATIVE
POC PH, URINE: 6 PH
POC PROTEIN, URINE: NEGATIVE MG/DL
POC SPECIFIC GRAVITY, URINE: 1.02
POC UROBILINOGEN, URINE: 0.2 EU/DL

## 2025-03-14 PROCEDURE — 99213 OFFICE O/P EST LOW 20 MIN: CPT | Mod: 25 | Performed by: OBSTETRICS & GYNECOLOGY

## 2025-03-14 PROCEDURE — 52287 CYSTOSCOPY CHEMODENERVATION: CPT | Performed by: OBSTETRICS & GYNECOLOGY

## 2025-03-14 PROCEDURE — 2500000002 HC RX 250 W HCPCS SELF ADMINISTERED DRUGS (ALT 637 FOR MEDICARE OP, ALT 636 FOR OP/ED): Mod: SE | Performed by: OBSTETRICS & GYNECOLOGY

## 2025-03-14 PROCEDURE — 2500000005 HC RX 250 GENERAL PHARMACY W/O HCPCS: Mod: SE | Performed by: OBSTETRICS & GYNECOLOGY

## 2025-03-14 PROCEDURE — 2500000004 HC RX 250 GENERAL PHARMACY W/ HCPCS (ALT 636 FOR OP/ED): Mod: SE | Performed by: OBSTETRICS & GYNECOLOGY

## 2025-03-14 PROCEDURE — 99213 OFFICE O/P EST LOW 20 MIN: CPT | Performed by: OBSTETRICS & GYNECOLOGY

## 2025-03-14 PROCEDURE — 81003 URINALYSIS AUTO W/O SCOPE: CPT | Mod: QW | Performed by: OBSTETRICS & GYNECOLOGY

## 2025-03-14 RX ORDER — SODIUM CHLORIDE 9 MG/ML
10 INJECTION, SOLUTION INTRAMUSCULAR; INTRAVENOUS; SUBCUTANEOUS ONCE
Status: COMPLETED | OUTPATIENT
Start: 2025-03-14 | End: 2025-03-14

## 2025-03-14 RX ORDER — PHENAZOPYRIDINE HYDROCHLORIDE 200 MG/1
200 TABLET, FILM COATED ORAL ONCE
Status: COMPLETED | OUTPATIENT
Start: 2025-03-14 | End: 2025-03-14

## 2025-03-14 RX ORDER — NITROFURANTOIN 25; 75 MG/1; MG/1
100 CAPSULE ORAL ONCE
Status: COMPLETED | OUTPATIENT
Start: 2025-03-14 | End: 2025-03-14

## 2025-03-14 RX ORDER — NITROFURANTOIN 25; 75 MG/1; MG/1
100 CAPSULE ORAL EVERY 12 HOURS SCHEDULED
Qty: 6 CAPSULE | Refills: 0 | Status: SHIPPED | OUTPATIENT
Start: 2025-03-14 | End: 2025-03-17

## 2025-03-14 RX ORDER — NITROFURANTOIN 25; 75 MG/1; MG/1
100 CAPSULE ORAL 2 TIMES DAILY
Qty: 5 CAPSULE | Refills: 0 | Status: SHIPPED | OUTPATIENT
Start: 2025-03-14 | End: 2025-03-17

## 2025-03-14 RX ORDER — LIDOCAINE HYDROCHLORIDE 10 MG/ML
5 INJECTION, SOLUTION INFILTRATION; PERINEURAL ONCE
Status: COMPLETED | OUTPATIENT
Start: 2025-03-14 | End: 2025-03-14

## 2025-03-14 RX ORDER — LIDOCAINE HYDROCHLORIDE 20 MG/ML
1 JELLY TOPICAL ONCE
Status: COMPLETED | OUTPATIENT
Start: 2025-03-14 | End: 2025-03-14

## 2025-03-14 RX ADMIN — SODIUM CHLORIDE 10 ML: 9 INJECTION, SOLUTION INTRAMUSCULAR; INTRAVENOUS; SUBCUTANEOUS at 15:37

## 2025-03-14 RX ADMIN — LIDOCAINE HYDROCHLORIDE 5 ML: 10 INJECTION, SOLUTION INFILTRATION; PERINEURAL at 15:37

## 2025-03-14 RX ADMIN — NITROFURANTOIN (MONOHYDRATE/MACROCRYSTALS) 100 MG: 75; 25 CAPSULE ORAL at 15:38

## 2025-03-14 RX ADMIN — ONABOTULINUMTOXINA 100 UNITS: 100 INJECTION, POWDER, LYOPHILIZED, FOR SOLUTION INTRADERMAL; INTRAMUSCULAR at 16:11

## 2025-03-14 RX ADMIN — PHENAZOPYRIDINE HYDROCHLORIDE 200 MG: 200 TABLET ORAL at 15:39

## 2025-03-14 RX ADMIN — LIDOCAINE HYDROCHLORIDE 1 APPLICATION: 20 JELLY TOPICAL at 15:39

## 2025-03-14 ASSESSMENT — PAIN SCALES - GENERAL: PAINLEVEL_OUTOF10: 6

## 2025-03-14 NOTE — PROGRESS NOTES
HPI:  Pt here for intradetrusor Botox injection    150 U 8/2023  100 U 7/2024  100 U 12/2024  The patient gave a urine sample which was checked for infection and found to be negative.  Pt was numbed for 20 min with lidojet inserted in to the bladder and given 100 mg po pyridium.    The patient was consented for cytoscopic intradetrusor Botox injection.      100 Units was reconstituted in     mL of NS  Using a flexible scope injection was performed at 4 sites using the Laborie needle at a 3 mm depth starting in the midline just above the intraureteric ridge and then to the left and right of this site and then just above until the entire drug volume was injected followed by 1 mL NS flush.    No bleeding was noted    The procedure was completed, the patient allowed to empty her bladder and get dressed.    Post-procedure precautions were given to the patient and macrobid 100 mg po BID x 3 days script sent.    She will follow-up with Kelsie Manzano in 1 month.    [facility]       ASSESSMENT & PLAN:  59-year-old female presenting for intradetrusor Botox injections for OAB.    Diagnoses:  #1 Overactive bladder  #2 Urge urinary incontinence    Plan:  OAB, UUI  - We discussed the risks of intradetrusor Botox and cystoscopy including the scope scraping the urethra which might cause burning/irritation, hematuria, and UTI. We will give her a one-time dose of Macrobid for UTI ppx and Pyridium to help prevent irritative symptoms from the cystoscope. This procedure is performed in office where we use cystoscopy to visualize the bladder neck/muscles and inject the Botox into 4 areas around the bladder to help reduce bladder spasms in hopes of reducing urinary urgency, frequency, nocturia, and incontinent episodes. There is around a 5% risk of urinary retention with symptoms of retention including strangury and urinary frequency with small volume urine voids however we will have her RTC in one week for a nurse visit to check her PVR. If  she experiences urinary retention we discussed that she can either begin taking po Flomax to help relax the urethra to allow urine to pass through vs. learn how to ISC to ensure she is emptying her bladder until the effects of Botox begin to wear off with time (I.e. between 2-8 weeks). Botox typically provides patients with 6-9 months of OAB symptom improvement and patients typically have intradetrusor Botox injections 1-2x per year to help manage their OAB.  - 100 U @ 4 sites.    Follow-up with Dr. Chacon in 4 months for repeat injection.    Scribe Attestation  By signing my name below, I, Ruy Ferreira, Carrol, attest that this documentation has been prepared under the direction and in the presence of Samantha Chacon MD on 03/14/2025 at 10:52 PM.    Agree with above. I Dr. Chacon, personally performed the services described in the documentation which was scribed virtually and confirm it is both complete and accurate.  Samantha Chacon MD  25 min total spent

## 2025-04-23 DIAGNOSIS — R05.2 SUBACUTE COUGH: ICD-10-CM

## 2025-04-29 ENCOUNTER — DOCUMENTATION (OUTPATIENT)
Dept: PHYSICAL THERAPY | Facility: CLINIC | Age: 60
End: 2025-04-29
Payer: COMMERCIAL

## 2025-04-29 NOTE — PROGRESS NOTES
Therapy Communication Note    Patient Name: Mona Sanders  MRN: [id number]  Department:   Room: Room/bed info not found  Today's Date: 4/29/2025     Discipline: Physical Therapy          Missed Visit Reason:  Was informed by PSR that pt did not show for today's visit.      Missed Time: No Show    Comment:

## 2025-05-01 RX ORDER — BUDESONIDE AND FORMOTEROL FUMARATE DIHYDRATE 80; 4.5 UG/1; UG/1
2 AEROSOL RESPIRATORY (INHALATION)
Qty: 10.2 G | Refills: 5 | Status: SHIPPED | OUTPATIENT
Start: 2025-05-01 | End: 2026-05-01

## 2025-05-01 RX ORDER — FLUTICASONE PROPIONATE 50 MCG
1 SPRAY, SUSPENSION (ML) NASAL DAILY
Qty: 16 G | Refills: 11 | Status: SHIPPED | OUTPATIENT
Start: 2025-05-01 | End: 2026-05-01

## 2025-05-02 ENCOUNTER — DOCUMENTATION (OUTPATIENT)
Dept: PHYSICAL THERAPY | Facility: CLINIC | Age: 60
End: 2025-05-02
Payer: COMMERCIAL

## 2025-05-02 NOTE — PROGRESS NOTES
Therapy Communication Note    Patient Name: Mona Sanders  MRN: [id number]  Department:   Room: Room/bed info not found  Today's Date: 5/2/2025     Discipline: Physical Therapy          Missed Visit Reason:  Patient was a no show after communicating with PSR.      Missed Time: No Show

## 2025-05-12 ENCOUNTER — TELEPHONE (OUTPATIENT)
Dept: PRIMARY CARE | Facility: CLINIC | Age: 60
End: 2025-05-12
Payer: COMMERCIAL

## 2025-05-12 ENCOUNTER — HOSPITAL ENCOUNTER (EMERGENCY)
Facility: HOSPITAL | Age: 60
Discharge: HOME | End: 2025-05-12
Attending: STUDENT IN AN ORGANIZED HEALTH CARE EDUCATION/TRAINING PROGRAM
Payer: COMMERCIAL

## 2025-05-12 VITALS
HEART RATE: 89 BPM | TEMPERATURE: 98.6 F | RESPIRATION RATE: 16 BRPM | WEIGHT: 173.7 LBS | DIASTOLIC BLOOD PRESSURE: 78 MMHG | OXYGEN SATURATION: 98 % | BODY MASS INDEX: 27.92 KG/M2 | HEIGHT: 66 IN | SYSTOLIC BLOOD PRESSURE: 107 MMHG

## 2025-05-12 DIAGNOSIS — M54.32 SCIATICA OF LEFT SIDE: Primary | ICD-10-CM

## 2025-05-12 PROCEDURE — 99284 EMERGENCY DEPT VISIT MOD MDM: CPT | Performed by: STUDENT IN AN ORGANIZED HEALTH CARE EDUCATION/TRAINING PROGRAM

## 2025-05-12 PROCEDURE — 2500000004 HC RX 250 GENERAL PHARMACY W/ HCPCS (ALT 636 FOR OP/ED): Mod: JZ | Performed by: STUDENT IN AN ORGANIZED HEALTH CARE EDUCATION/TRAINING PROGRAM

## 2025-05-12 PROCEDURE — 2500000005 HC RX 250 GENERAL PHARMACY W/O HCPCS: Performed by: STUDENT IN AN ORGANIZED HEALTH CARE EDUCATION/TRAINING PROGRAM

## 2025-05-12 PROCEDURE — 96372 THER/PROPH/DIAG INJ SC/IM: CPT | Performed by: STUDENT IN AN ORGANIZED HEALTH CARE EDUCATION/TRAINING PROGRAM

## 2025-05-12 PROCEDURE — 2500000001 HC RX 250 WO HCPCS SELF ADMINISTERED DRUGS (ALT 637 FOR MEDICARE OP): Performed by: STUDENT IN AN ORGANIZED HEALTH CARE EDUCATION/TRAINING PROGRAM

## 2025-05-12 RX ORDER — ACETAMINOPHEN 325 MG/1
650 TABLET ORAL ONCE
Status: COMPLETED | OUTPATIENT
Start: 2025-05-12 | End: 2025-05-12

## 2025-05-12 RX ORDER — LIDOCAINE 560 MG/1
1 PATCH PERCUTANEOUS; TOPICAL; TRANSDERMAL ONCE
Status: DISCONTINUED | OUTPATIENT
Start: 2025-05-12 | End: 2025-05-12 | Stop reason: HOSPADM

## 2025-05-12 RX ORDER — KETOROLAC TROMETHAMINE 30 MG/ML
30 INJECTION, SOLUTION INTRAMUSCULAR; INTRAVENOUS ONCE
Status: COMPLETED | OUTPATIENT
Start: 2025-05-12 | End: 2025-05-12

## 2025-05-12 RX ORDER — ACETAMINOPHEN 500 MG
1000 TABLET ORAL EVERY 6 HOURS PRN
Qty: 30 TABLET | Refills: 0 | Status: SHIPPED | OUTPATIENT
Start: 2025-05-12 | End: 2025-05-22

## 2025-05-12 RX ORDER — IBUPROFEN 400 MG/1
600 TABLET ORAL EVERY 6 HOURS PRN
Qty: 30 TABLET | Refills: 0 | Status: SHIPPED | OUTPATIENT
Start: 2025-05-12 | End: 2025-05-19

## 2025-05-12 RX ADMIN — LIDOCAINE 1 PATCH: 4 PATCH TOPICAL at 21:03

## 2025-05-12 RX ADMIN — KETOROLAC TROMETHAMINE 30 MG: 30 INJECTION, SOLUTION INTRAMUSCULAR at 21:01

## 2025-05-12 RX ADMIN — ACETAMINOPHEN 650 MG: 325 TABLET ORAL at 20:55

## 2025-05-12 ASSESSMENT — PAIN DESCRIPTION - PAIN TYPE: TYPE: ACUTE PAIN

## 2025-05-12 ASSESSMENT — PAIN DESCRIPTION - FREQUENCY: FREQUENCY: CONSTANT/CONTINUOUS

## 2025-05-12 ASSESSMENT — PAIN DESCRIPTION - ORIENTATION: ORIENTATION: LEFT

## 2025-05-12 ASSESSMENT — PAIN DESCRIPTION - LOCATION: LOCATION: HIP

## 2025-05-12 ASSESSMENT — PAIN DESCRIPTION - DESCRIPTORS: DESCRIPTORS: SHARP

## 2025-05-12 ASSESSMENT — COLUMBIA-SUICIDE SEVERITY RATING SCALE - C-SSRS
6. HAVE YOU EVER DONE ANYTHING, STARTED TO DO ANYTHING, OR PREPARED TO DO ANYTHING TO END YOUR LIFE?: NO
1. IN THE PAST MONTH, HAVE YOU WISHED YOU WERE DEAD OR WISHED YOU COULD GO TO SLEEP AND NOT WAKE UP?: NO
2. HAVE YOU ACTUALLY HAD ANY THOUGHTS OF KILLING YOURSELF?: NO

## 2025-05-12 ASSESSMENT — PAIN SCALES - GENERAL
PAINLEVEL_OUTOF10: 8
PAINLEVEL_OUTOF10: 3

## 2025-05-12 ASSESSMENT — PAIN - FUNCTIONAL ASSESSMENT
PAIN_FUNCTIONAL_ASSESSMENT: 0-10
PAIN_FUNCTIONAL_ASSESSMENT: 0-10

## 2025-05-13 NOTE — DISCHARGE INSTRUCTIONS
You were seen in the ER for sciatica pain.  Please take Motrin Tylenol for pain control.  Please follow-up with your PCP and pain management.  Please come back if you develop worsening of the symptoms, weakness, numbness, saddle anesthesia, able to ambulate, abdominal pain, fever, chills, or any other symptoms.

## 2025-05-13 NOTE — ED PROVIDER NOTES
Emergency Department Provider Note       History of Present Illness     History provided by: Patient  Limitations to History: None  External Records Reviewed with Brief Summary: EMR reviewed    HPI:  Mona Sanders is a 60 y.o. female with hx of COOPD, Depression, HTN, sciatica presents due to left sided lower back pain. Pt report acute on chronic left sided back pain, moving intermittent to the lower extremities.  Denied recent trauma, weakness, numbness, saddle anesthesia, urinary retention, urinary incontinence, constipation, fever, chills.          Physical Exam   Triage vitals:  T 37 °C (98.6 °F)  HR (!) 117  /79  RR 17  O2 94 % None (Room air)    Physical Exam  Vitals and nursing note reviewed.   Constitutional:       Comments: Initially in pain sitting in a chair   HENT:      Head: Normocephalic and atraumatic.      Mouth/Throat:      Mouth: Mucous membranes are moist.   Cardiovascular:      Rate and Rhythm: Tachycardia present.   Pulmonary:      Effort: Pulmonary effort is normal.   Musculoskeletal:      Cervical back: Normal range of motion and neck supple.      Comments: Left lower back pain TTP, strengthen intact in all exctremities   Neurological:      General: No focal deficit present.      Mental Status: She is alert.      Sensory: Sensation is intact.      Motor: Motor function is intact.           Medical Decision Making & ED Course   Medical Decision Makin y.o. female with above hx with acute on chronic back pain. No sign or symptoms of compression syndrome.   Exacerbation of known sciatica  No other symptoms  Tachycardia can be due to pain  Plan: pain control, reassess    On reassessment patient pain improved.   Tachycardia resolved   Will continue with pain meds at home and PCP follow up.   DC       ----      Differential diagnoses considered include but are not limited to: see above    Social Determinants of Health which Significantly Impact Care: Social Determinants of  Health which Significantly Impact Care: None identified     EKG Independent Interpretation: EKG not obtained    Independent Result Review and Interpretation: None obtained    Chronic conditions affecting the patient's care: As documented above in MDM    The patient was discussed with the following consultants/services: None    Care Considerations: As documented above in [facility]    ED Course:  Diagnoses as of 05/13/25 1331   Sciatica of left side       Disposition   As a result of the work-up, the patient was discharged home.  she was informed of her diagnosis and instructed to come back with any concerns or worsening of condition.  she and was agreeable to the plan as discussed above.  she was given the opportunity to ask questions.  All of the patient's questions were answered.    Procedures   Procedures    Patient was seen independently    Karolina Rashid MD  Emergency Medicine                                                            Karolina Rashid MD  05/13/25 [id number]

## 2025-05-21 ENCOUNTER — DOCUMENTATION (OUTPATIENT)
Dept: PHYSICAL THERAPY | Facility: CLINIC | Age: 60
End: 2025-05-21
Payer: COMMERCIAL

## 2025-05-21 NOTE — PROGRESS NOTES
"             Therapy Communication Note    Patient Name: Mona Sanders  MRN: [id number]  Department:   Room: Room/bed info not found  Today's Date: 5/21/2025     Discipline: Physical Therapy    Missed Visit:   Was informed by PSR that pt did not show for today's visit. This was the patient's 3rd consecutive \"No Show\" visit. At this time, she will scheduled day-of if she calls to RS.      Missed Time: No Show    Comment:          "

## 2025-06-12 ENCOUNTER — APPOINTMENT (OUTPATIENT)
Dept: PRIMARY CARE | Facility: CLINIC | Age: 60
End: 2025-06-12
Payer: COMMERCIAL

## 2025-07-13 NOTE — PROGRESS NOTES
HPI:  Pt here for intradetrusor Botox injection    150 U 8/2023  100 U 7/2024  100 U 12/2024  100 U 3/2025    The patient gave a urine sample which was checked for infection and found to be negative.  Pt was numbed for 20 min with lidojet inserted in to the bladder and given 100 mg po pyridium.    The patient was consented for cytoscopic intradetrusor Botox injection.        Units was reconstituted in     mL of NS  Using a flexible scope injection was performed at 4 sites using the Laborie needle at a 3 mm depth starting in the midline just above the intraureteric ridge and then to the left and right of this site and then just above until the entire drug volume was injected followed by 1 mL NS flush.    No bleeding was noted    The procedure was completed, the patient allowed to empty her bladder and get dressed.    Post-procedure precautions were given to the patient and macrobid 100 mg po BID x 3 days script sent.    She will follow-up with Kelsie Manzano in 1 month.    STM

## 2025-07-14 ENCOUNTER — APPOINTMENT (OUTPATIENT)
Dept: OBSTETRICS AND GYNECOLOGY | Facility: CLINIC | Age: 60
End: 2025-07-14
Payer: COMMERCIAL

## 2025-07-14 NOTE — PROGRESS NOTES
Schedule for appt on  8/07/2025 All questions answered, & Mona verbalized understanding. Increase dose to 150 units

## 2025-07-15 ENCOUNTER — APPOINTMENT (OUTPATIENT)
Dept: PRIMARY CARE | Facility: CLINIC | Age: 60
End: 2025-07-15
Payer: COMMERCIAL

## 2025-07-15 VITALS
WEIGHT: 172 LBS | TEMPERATURE: 95.5 F | OXYGEN SATURATION: 99 % | HEART RATE: 95 BPM | HEIGHT: 66 IN | DIASTOLIC BLOOD PRESSURE: 60 MMHG | BODY MASS INDEX: 27.64 KG/M2 | SYSTOLIC BLOOD PRESSURE: 100 MMHG

## 2025-07-15 DIAGNOSIS — M19.041 ARTHRITIS OF BOTH HANDS: ICD-10-CM

## 2025-07-15 DIAGNOSIS — I63.89 CEREBROVASCULAR ACCIDENT (CVA) DUE TO OTHER MECHANISM: ICD-10-CM

## 2025-07-15 DIAGNOSIS — F17.210 CIGARETTE NICOTINE DEPENDENCE WITHOUT COMPLICATION: Primary | ICD-10-CM

## 2025-07-15 DIAGNOSIS — M19.042 ARTHRITIS OF BOTH HANDS: ICD-10-CM

## 2025-07-15 DIAGNOSIS — R53.82 CHRONIC FATIGUE: ICD-10-CM

## 2025-07-15 PROBLEM — R55 SYNCOPE AND COLLAPSE: Status: RESOLVED | Noted: 2023-08-31 | Resolved: 2025-07-15

## 2025-07-15 PROBLEM — R42 DIZZINESS AND GIDDINESS: Status: RESOLVED | Noted: 2021-10-15 | Resolved: 2025-07-15

## 2025-07-15 PROBLEM — R60.0 LEG EDEMA: Status: RESOLVED | Noted: 2023-06-02 | Resolved: 2025-07-15

## 2025-07-15 PROBLEM — Z72.0 TOBACCO ABUSE: Status: RESOLVED | Noted: 2023-08-31 | Resolved: 2025-07-15

## 2025-07-15 PROCEDURE — 96372 THER/PROPH/DIAG INJ SC/IM: CPT | Performed by: FAMILY MEDICINE

## 2025-07-15 PROCEDURE — 99213 OFFICE O/P EST LOW 20 MIN: CPT | Performed by: FAMILY MEDICINE

## 2025-07-15 PROCEDURE — 3074F SYST BP LT 130 MM HG: CPT | Performed by: FAMILY MEDICINE

## 2025-07-15 PROCEDURE — 3008F BODY MASS INDEX DOCD: CPT | Performed by: FAMILY MEDICINE

## 2025-07-15 PROCEDURE — 3078F DIAST BP <80 MM HG: CPT | Performed by: FAMILY MEDICINE

## 2025-07-15 RX ORDER — CYANOCOBALAMIN 1000 UG/ML
1000 INJECTION, SOLUTION INTRAMUSCULAR; SUBCUTANEOUS ONCE
Status: COMPLETED | OUTPATIENT
Start: 2025-07-15 | End: 2025-07-15

## 2025-07-15 RX ADMIN — CYANOCOBALAMIN 1000 MCG: 1000 INJECTION, SOLUTION INTRAMUSCULAR; SUBCUTANEOUS at 11:31

## 2025-07-15 ASSESSMENT — ENCOUNTER SYMPTOMS
COUGH: 0
FEVER: 0
SHORTNESS OF BREATH: 0
JOINT SWELLING: 1
SINUS PAIN: 0
BRUISES/BLEEDS EASILY: 0
TREMORS: 0
DYSPHORIC MOOD: 0
TROUBLE SWALLOWING: 0
BLOOD IN STOOL: 0
UNEXPECTED WEIGHT CHANGE: 0
ABDOMINAL PAIN: 0
HEMATURIA: 0
ARTHRALGIAS: 1
DYSURIA: 0
BACK PAIN: 1
WEAKNESS: 0

## 2025-07-15 ASSESSMENT — PAIN SCALES - GENERAL: PAINLEVEL_OUTOF10: 0-NO PAIN

## 2025-07-15 NOTE — PROGRESS NOTES
Subjective   Patient ID: Mona Sanders is a 60 y.o. female.    Patient with diffuse arthritis. Shoulders, back, knees, hips, hands with deformity.  No fever, occ swelling. Hx CVA, venous thrombosis, concussion, visual changes. Mom with RA and hands are looking similar. Smoker.         Review of Systems   Constitutional:  Negative for fever and unexpected weight change.   HENT:  Negative for ear pain, nosebleeds, sinus pain and trouble swallowing.    Eyes:  Positive for visual disturbance.   Respiratory:  Negative for cough and shortness of breath.    Cardiovascular:  Negative for chest pain.   Gastrointestinal:  Negative for abdominal pain and blood in stool.   Genitourinary:  Negative for dysuria and hematuria.   Musculoskeletal:  Positive for arthralgias, back pain and joint swelling. Negative for gait problem.   Neurological:  Negative for tremors and weakness.   Hematological:  Does not bruise/bleed easily.   Psychiatric/Behavioral:  Negative for dysphoric mood and suicidal ideas.      Vitals:    07/15/25 1108   BP: 100/60   Pulse: 95   Temp: 35.3 °C (95.5 °F)   SpO2: 99%      Body mass index is 27.76 kg/m².  Objective   Physical Exam  Constitutional:       General: She is not in acute distress.     Appearance: She is not toxic-appearing.   Musculoskeletal:      Comments: Hands with enlarged MCP joints, mild ulnar deviation. No redness.   Neurological:      General: No focal deficit present.      Mental Status: She is alert and oriented to person, place, and time.   Psychiatric:         Mood and Affect: Mood normal.         Behavior: Behavior normal.         Thought Content: Thought content normal.         Judgment: Judgment normal.         Last Labs:     CMP:   Lab Results   Component Value Date    CALCIUM 8.4 (L) 01/17/2025    CALCIUM 8.4 (L) 01/16/2025    PHOS 2.9 06/03/2020    PROT 5.3 (L) 01/17/2025    PROT 5.5 (L) 01/16/2025    ALBUMIN 3.4 01/17/2025    ALBUMIN 3.7 01/16/2025    AST 12 01/17/2025     "AST 16 01/16/2025    ALKPHOS 85 01/17/2025    ALKPHOS 93 01/16/2025    BILITOT 0.3 01/17/2025    BILITOT 0.3 01/16/2025     CBC:   Lab Results   Component Value Date    WBC 7.6 01/17/2025    WBC 8.8 01/16/2025    HGB 12.5 01/17/2025    HGB 12.8 01/16/2025    HCT 39.3 01/17/2025    HCT 39.8 01/16/2025    MCV 86 01/17/2025    MCV 85 01/16/2025     01/17/2025     01/16/2025     A1C:   Lab Results   Component Value Date    HGBA1C 5.5 01/15/2025    HGBA1C 5.8 (H) 01/22/2024     LIPID PANEL:   Lab Results   Component Value Date    CHOL 101 01/15/2025    CHOL 119 08/12/2022    CHOL 97 09/22/2021    TRIG 69 01/15/2025    TRIG 88 08/12/2022    TRIG 106 09/22/2021    HDL 48.0 01/15/2025    HDL 47.5 08/12/2022    HDL 42.1 09/22/2021    CHHDL 2.1 01/15/2025    CHHDL 2.5 08/12/2022    CHHDL 2.3 09/22/2021    CHHDL 2.8 06/25/2019    LDLF 54 08/12/2022    LDLF 34 09/22/2021    VLDL 14 01/15/2025    VLDL 18 08/12/2022    VLDL 21 09/22/2021    NHDL 53 01/15/2025     TSH:   Lab Results   Component Value Date    TSH 0.31 (L) 01/15/2025    TSH 0.81 08/10/2022    TSH 1.88 09/22/2021    TSH 1.88 06/25/2019     PSA:   No results found for: \"PSA\"     Assessment/Plan   Diagnoses and all orders for this visit:  Cigarette nicotine dependence without complication  Chronic fatigue  -     cyanocobalamin (Vitamin B-12) injection 1,000 mcg  Arthritis of both hands  -     Referral to Rheumatology; Future  -     CONNIE with Reflex to [facility]; Future  -     CBC and Auto Differential; Future  -     Citrulline Antibody, IgG; Future  -     C-Reactive Protein; Future  -     Rheumatoid Factor; Future  -     Sedimentation Rate; Future  -     Uric Acid; Future  Cerebrovascular accident (CVA) due to other mechanism  -     Referral to Neurology; Future      "

## 2025-07-15 NOTE — PATIENT INSTRUCTIONS
It was nice to see you today!  Discussed current concerns and addressed   Reviewed recent labs and diagnostics  Reviewed medications list  Continue to eat a healthy diet, exercise at least 3 times a week or more  Plan and follow up discussed  For any further information related to your condition, copy and paste or go to [email or web address]  To neuro for late effects of stroke, per pt request, follow up with eye doctor  To rheum, labs first, per pt request  B12 today per patient request

## 2025-08-07 ENCOUNTER — APPOINTMENT (OUTPATIENT)
Dept: OBSTETRICS AND GYNECOLOGY | Facility: CLINIC | Age: 60
End: 2025-08-07
Payer: COMMERCIAL

## 2025-08-18 ENCOUNTER — TELEPHONE (OUTPATIENT)
Dept: OBSTETRICS AND GYNECOLOGY | Facility: CLINIC | Age: 60
End: 2025-08-18
Payer: COMMERCIAL

## 2025-08-21 ENCOUNTER — TELEMEDICINE (OUTPATIENT)
Dept: OBSTETRICS AND GYNECOLOGY | Facility: CLINIC | Age: 60
End: 2025-08-21
Payer: COMMERCIAL

## 2025-08-21 ENCOUNTER — PREP FOR PROCEDURE (OUTPATIENT)
Dept: OBSTETRICS AND GYNECOLOGY | Facility: CLINIC | Age: 60
End: 2025-08-21

## 2025-08-21 DIAGNOSIS — N32.81 OVERACTIVE BLADDER: Primary | ICD-10-CM

## 2025-08-21 DIAGNOSIS — N32.81 OAB (OVERACTIVE BLADDER): Primary | ICD-10-CM

## 2025-08-21 PROCEDURE — 99212 OFFICE O/P EST SF 10 MIN: CPT | Performed by: OBSTETRICS & GYNECOLOGY

## 2025-08-21 RX ORDER — PHENAZOPYRIDINE HYDROCHLORIDE 200 MG/1
200 TABLET, FILM COATED ORAL ONCE
OUTPATIENT
Start: 2025-08-21 | End: 2025-08-21

## 2025-08-21 RX ORDER — ACETAMINOPHEN 325 MG/1
975 TABLET ORAL ONCE
OUTPATIENT
Start: 2025-08-21 | End: 2025-08-21

## 2025-08-21 RX ORDER — GABAPENTIN 600 MG/1
600 TABLET ORAL ONCE
OUTPATIENT
Start: 2025-08-21 | End: 2025-08-21

## 2025-08-21 RX ORDER — CELECOXIB 400 MG/1
400 CAPSULE ORAL ONCE
OUTPATIENT
Start: 2025-08-21 | End: 2025-08-21

## 2025-09-04 ENCOUNTER — APPOINTMENT (OUTPATIENT)
Dept: OBSTETRICS AND GYNECOLOGY | Facility: CLINIC | Age: 60
End: 2025-09-04
Payer: COMMERCIAL

## 2026-02-02 ENCOUNTER — APPOINTMENT (OUTPATIENT)
Dept: NEUROLOGY | Facility: CLINIC | Age: 61
End: 2026-02-02
Payer: COMMERCIAL